# Patient Record
Sex: FEMALE | Race: ASIAN | Employment: OTHER | URBAN - METROPOLITAN AREA
[De-identification: names, ages, dates, MRNs, and addresses within clinical notes are randomized per-mention and may not be internally consistent; named-entity substitution may affect disease eponyms.]

---

## 2017-10-02 ENCOUNTER — HOSPITAL ENCOUNTER (EMERGENCY)
Age: 62
Discharge: HOME | End: 2017-10-02
Attending: EMERGENCY MEDICINE
Payer: COMMERCIAL

## 2017-10-02 VITALS
HEART RATE: 76 BPM | OXYGEN SATURATION: 97 % | SYSTOLIC BLOOD PRESSURE: 161 MMHG | TEMPERATURE: 98 F | HEIGHT: 63 IN | BODY MASS INDEX: 20.91 KG/M2 | WEIGHT: 118 LBS | DIASTOLIC BLOOD PRESSURE: 74 MMHG | RESPIRATION RATE: 16 BRPM

## 2017-10-02 DIAGNOSIS — M54.50 LOW BACK PAIN WITHOUT SCIATICA, UNSPECIFIED BACK PAIN LATERALITY, UNSPECIFIED CHRONICITY: Primary | ICD-10-CM

## 2017-10-02 LAB
ALBUMIN SERPL-MCNC: 4.1 G/DL (ref 3.5–5)
ALBUMIN/GLOB SERPL: 1.2 {RATIO} (ref 1.1–2.2)
ALP SERPL-CCNC: 80 U/L (ref 45–117)
ALT SERPL-CCNC: 24 U/L (ref 12–78)
ANION GAP SERPL CALC-SCNC: 8 MMOL/L (ref 5–15)
APPEARANCE UR: CLEAR
AST SERPL-CCNC: 25 U/L (ref 15–37)
BACTERIA URNS QL MICRO: ABNORMAL /HPF
BASOPHILS # BLD: 0 K/UL (ref 0–0.1)
BASOPHILS NFR BLD: 0 % (ref 0–1)
BILIRUB DIRECT SERPL-MCNC: 0.1 MG/DL (ref 0–0.2)
BILIRUB SERPL-MCNC: 0.6 MG/DL (ref 0.2–1)
BILIRUB UR QL: NEGATIVE
BUN SERPL-MCNC: 10 MG/DL (ref 6–20)
BUN/CREAT SERPL: 13 (ref 12–20)
CALCIUM SERPL-MCNC: 8.7 MG/DL (ref 8.5–10.1)
CHLORIDE SERPL-SCNC: 100 MMOL/L (ref 97–108)
CO2 SERPL-SCNC: 30 MMOL/L (ref 21–32)
COLOR UR: ABNORMAL
CREAT SERPL-MCNC: 0.75 MG/DL (ref 0.55–1.02)
DIFFERENTIAL METHOD BLD: NORMAL
EOSINOPHIL # BLD: 0.1 K/UL (ref 0–0.4)
EOSINOPHIL NFR BLD: 1 % (ref 0–7)
EPITH CASTS URNS QL MICRO: ABNORMAL /LPF
ERYTHROCYTE [DISTWIDTH] IN BLOOD BY AUTOMATED COUNT: 13 % (ref 11.5–14.5)
GLOBULIN SER CALC-MCNC: 3.5 G/DL (ref 2–4)
GLUCOSE SERPL-MCNC: 132 MG/DL (ref 65–100)
GLUCOSE UR STRIP.AUTO-MCNC: NEGATIVE MG/DL
HCT VFR BLD AUTO: 42.9 % (ref 35–47)
HGB BLD-MCNC: 14.1 G/DL (ref 11.5–16)
HGB UR QL STRIP: ABNORMAL
KETONES UR QL STRIP.AUTO: NEGATIVE MG/DL
LEUKOCYTE ESTERASE UR QL STRIP.AUTO: NEGATIVE
LIPASE SERPL-CCNC: 222 U/L (ref 73–393)
LYMPHOCYTES # BLD: 1.6 K/UL (ref 0.8–3.5)
LYMPHOCYTES NFR BLD: 24 % (ref 12–49)
MCH RBC QN AUTO: 29.4 PG (ref 26–34)
MCHC RBC AUTO-ENTMCNC: 32.9 G/DL (ref 30–36.5)
MCV RBC AUTO: 89.6 FL (ref 80–99)
MONOCYTES # BLD: 0.5 K/UL (ref 0–1)
MONOCYTES NFR BLD: 7 % (ref 5–13)
NEUTS SEG # BLD: 4.6 K/UL (ref 1.8–8)
NEUTS SEG NFR BLD: 68 % (ref 32–75)
NITRITE UR QL STRIP.AUTO: NEGATIVE
PH UR STRIP: 6.5 [PH] (ref 5–8)
PLATELET # BLD AUTO: 176 K/UL (ref 150–400)
POTASSIUM SERPL-SCNC: 3.8 MMOL/L (ref 3.5–5.1)
PROT SERPL-MCNC: 7.6 G/DL (ref 6.4–8.2)
PROT UR STRIP-MCNC: NEGATIVE MG/DL
RBC # BLD AUTO: 4.79 M/UL (ref 3.8–5.2)
RBC #/AREA URNS HPF: ABNORMAL /HPF (ref 0–5)
SODIUM SERPL-SCNC: 138 MMOL/L (ref 136–145)
SP GR UR REFRACTOMETRY: <1.005 (ref 1–1.03)
UA: UC IF INDICATED,UAUC: ABNORMAL
UROBILINOGEN UR QL STRIP.AUTO: 0.2 EU/DL (ref 0.2–1)
WBC # BLD AUTO: 6.8 K/UL (ref 3.6–11)
WBC URNS QL MICRO: ABNORMAL /HPF (ref 0–4)

## 2017-10-02 PROCEDURE — 87186 SC STD MICRODIL/AGAR DIL: CPT | Performed by: EMERGENCY MEDICINE

## 2017-10-02 PROCEDURE — 36415 COLL VENOUS BLD VENIPUNCTURE: CPT | Performed by: EMERGENCY MEDICINE

## 2017-10-02 PROCEDURE — 83690 ASSAY OF LIPASE: CPT | Performed by: EMERGENCY MEDICINE

## 2017-10-02 PROCEDURE — 74011250636 HC RX REV CODE- 250/636: Performed by: EMERGENCY MEDICINE

## 2017-10-02 PROCEDURE — 87077 CULTURE AEROBIC IDENTIFY: CPT | Performed by: EMERGENCY MEDICINE

## 2017-10-02 PROCEDURE — 80076 HEPATIC FUNCTION PANEL: CPT | Performed by: EMERGENCY MEDICINE

## 2017-10-02 PROCEDURE — 96374 THER/PROPH/DIAG INJ IV PUSH: CPT

## 2017-10-02 PROCEDURE — 85025 COMPLETE CBC W/AUTO DIFF WBC: CPT | Performed by: EMERGENCY MEDICINE

## 2017-10-02 PROCEDURE — 81001 URINALYSIS AUTO W/SCOPE: CPT | Performed by: EMERGENCY MEDICINE

## 2017-10-02 PROCEDURE — 99284 EMERGENCY DEPT VISIT MOD MDM: CPT

## 2017-10-02 PROCEDURE — 87086 URINE CULTURE/COLONY COUNT: CPT | Performed by: EMERGENCY MEDICINE

## 2017-10-02 PROCEDURE — 80048 BASIC METABOLIC PNL TOTAL CA: CPT | Performed by: EMERGENCY MEDICINE

## 2017-10-02 PROCEDURE — 96361 HYDRATE IV INFUSION ADD-ON: CPT

## 2017-10-02 RX ORDER — KETOROLAC TROMETHAMINE 30 MG/ML
15 INJECTION, SOLUTION INTRAMUSCULAR; INTRAVENOUS
Status: COMPLETED | OUTPATIENT
Start: 2017-10-02 | End: 2017-10-02

## 2017-10-02 RX ORDER — METOPROLOL SUCCINATE 50 MG/1
50 TABLET, EXTENDED RELEASE ORAL
COMMUNITY

## 2017-10-02 RX ORDER — METFORMIN HYDROCHLORIDE 750 MG/1
750 TABLET, EXTENDED RELEASE ORAL
Status: ON HOLD | COMMUNITY
End: 2017-10-23

## 2017-10-02 RX ORDER — TRAMADOL HYDROCHLORIDE 50 MG/1
50 TABLET ORAL
Qty: 20 TAB | Refills: 0 | Status: SHIPPED | OUTPATIENT
Start: 2017-10-02 | End: 2017-10-07

## 2017-10-02 RX ORDER — PRAVASTATIN SODIUM 40 MG/1
40 TABLET ORAL
COMMUNITY
End: 2019-06-18 | Stop reason: ALTCHOICE

## 2017-10-02 RX ORDER — LEVOTHYROXINE SODIUM 75 UG/1
75 TABLET ORAL
COMMUNITY

## 2017-10-02 RX ORDER — IBUPROFEN 600 MG/1
600 TABLET ORAL
Qty: 20 TAB | Refills: 0 | Status: SHIPPED | OUTPATIENT
Start: 2017-10-02 | End: 2017-10-07

## 2017-10-02 RX ADMIN — KETOROLAC TROMETHAMINE 15 MG: 30 INJECTION, SOLUTION INTRAMUSCULAR at 09:50

## 2017-10-02 RX ADMIN — SODIUM CHLORIDE 1000 ML: 900 INJECTION, SOLUTION INTRAVENOUS at 09:50

## 2017-10-02 NOTE — ED NOTES
Pt was discharged and given instructions by Dr Bing Romero . Pt and daughter verbalized good understanding of all discharge instructions,prescriptions and F/U care. All questions answered. Pt in stable condition on discharge. Pt ambulated out of ER with a steady gait.

## 2017-10-02 NOTE — ED TRIAGE NOTES
Pt was wheeled to the treatment area. Pt states \"On Saturday I had to stand outside in the cold for 1 hour. Since then I have had back pain with radiation to my hips and thighs. \" Pt denies fever.  Daughter states \"she complained of abdominal pain to\"

## 2017-10-02 NOTE — DISCHARGE INSTRUCTIONS
Back Pain: Care Instructions  Your Care Instructions    Back pain has many possible causes. It is often related to problems with muscles and ligaments of the back. It may also be related to problems with the nerves, discs, or bones of the back. Moving, lifting, standing, sitting, or sleeping in an awkward way can strain the back. Sometimes you don't notice the injury until later. Arthritis is another common cause of back pain. Although it may hurt a lot, back pain usually improves on its own within several weeks. Most people recover in 12 weeks or less. Using good home treatment and being careful not to stress your back can help you feel better sooner. Follow-up care is a key part of your treatment and safety. Be sure to make and go to all appointments, and call your doctor if you are having problems. Its also a good idea to know your test results and keep a list of the medicines you take. How can you care for yourself at home? · Sit or lie in positions that are most comfortable and reduce your pain. Try one of these positions when you lie down:  ¨ Lie on your back with your knees bent and supported by large pillows. ¨ Lie on the floor with your legs on the seat of a sofa or chair. Garald Shaver on your side with your knees and hips bent and a pillow between your legs. ¨ Lie on your stomach if it does not make pain worse. · Do not sit up in bed, and avoid soft couches and twisted positions. Bed rest can help relieve pain at first, but it delays healing. Avoid bed rest after the first day of back pain. · Change positions every 30 minutes. If you must sit for long periods of time, take breaks from sitting. Get up and walk around, or lie in a comfortable position. · Try using a heating pad on a low or medium setting for 15 to 20 minutes every 2 or 3 hours. Try a warm shower in place of one session with the heating pad. · You can also try an ice pack for 10 to 15 minutes every 2 to 3 hours.  Put a thin cloth between the ice pack and your skin. · Take pain medicines exactly as directed. ¨ If the doctor gave you a prescription medicine for pain, take it as prescribed. ¨ If you are not taking a prescription pain medicine, ask your doctor if you can take an over-the-counter medicine. · Take short walks several times a day. You can start with 5 to 10 minutes, 3 or 4 times a day, and work up to longer walks. Walk on level surfaces and avoid hills and stairs until your back is better. · Return to work and other activities as soon as you can. Continued rest without activity is usually not good for your back. · To prevent future back pain, do exercises to stretch and strengthen your back and stomach. Learn how to use good posture, safe lifting techniques, and proper body mechanics. When should you call for help? Call your doctor now or seek immediate medical care if:  · You have new or worsening numbness in your legs. · You have new or worsening weakness in your legs. (This could make it hard to stand up.)  · You lose control of your bladder or bowels. Watch closely for changes in your health, and be sure to contact your doctor if:  · Your pain gets worse. · You are not getting better after 2 weeks. Where can you learn more? Go to http://elba-lydia.info/. Enter E996 in the search box to learn more about \"Back Pain: Care Instructions. \"  Current as of: March 21, 2017  Content Version: 11.3  © 5143-5743 [employer]. Care instructions adapted under license by [employer] (which disclaims liability or warranty for this information). If you have questions about a medical condition or this instruction, always ask your healthcare professional. [address] any warranty or liability for your use of this information.

## 2017-10-02 NOTE — ED NOTES
Plan of care and all test/meds ordered explained to pt and daughter. Good understanding and agreement with plan was verbalized.

## 2017-10-02 NOTE — ED PROVIDER NOTES
HPI Comments: 22-year-old  female presents to the emergency department with back pain. Patient was at a wedding 2 days ago outside. She says that it was chilly outside. She was shaking during the wedding. She then went home because she started to have back pain. Patient also complains of abdominal pain. The abdominal pain does not hurt to palpate the abdomen. She feels like the back pain radiates to her abdomen. Patient denies weakness or numbness in arms or legs. She denies difficulty with having bowel movements. No dysuria or hematuria. No urinary frequency. Patient denies fevers. Yesterday she was feeling much better than on Saturday. She still was having low back pain. She took one ibuprofen yesterday and a Tylenol with codeine this morning. Patient is here with her daughter. Patient denies tobacco or alcohol use. The history is provided by the patient and a relative. Past Medical History:   Diagnosis Date    Diabetes ([facility] [address]     type 2    Hypertension     Ill-defined condition     cholesterol    Thyroid dysfunction     Hypothyroid       History reviewed. No pertinent surgical history. History reviewed. No pertinent family history. Social History     Social History    Marital status: N/A     Spouse name: N/A    Number of children: N/A    Years of education: N/A     Occupational History    Not on file. Social History Main Topics    Smoking status: Not on file    Smokeless tobacco: Not on file    Alcohol use Not on file    Drug use: Not on file    Sexual activity: Not on file     Other Topics Concern    Not on file     Social History Narrative    No narrative on file         ALLERGIES: Review of patient's allergies indicates no known allergies. Review of Systems   Constitutional: Negative for fever. HENT: Negative for facial swelling and nosebleeds. Eyes: Negative for pain. Respiratory: Negative for cough, chest tightness and shortness of breath. Cardiovascular: Negative for chest pain and leg swelling. Gastrointestinal: Positive for abdominal pain. Negative for diarrhea, nausea and vomiting. Endocrine: Negative for polyuria. Genitourinary: Negative for difficulty urinating and flank pain. Musculoskeletal: Positive for arthralgias and back pain. Skin: Negative for color change. Allergic/Immunologic: Negative for immunocompromised state. Neurological: Negative for dizziness and headaches. Hematological: Does not bruise/bleed easily. Psychiatric/Behavioral: Negative for agitation. All other systems reviewed and are negative. Vitals:    10/02/17 0902   BP: 155/74   Pulse: 76   Resp: 16   Temp: 98 °F (36.7 °C)   SpO2: 100%   Weight: 53.5 kg (118 lb)   Height: 5' 3\" (1.6 m)            Physical Exam   Constitutional: She is oriented to person, place, and time. She appears well-developed and well-nourished. HENT:   Head: Normocephalic and atraumatic. Right Ear: External ear normal.   Left Ear: External ear normal.   Nose: Nose normal.   Mouth/Throat: Oropharynx is clear and moist.   Eyes: EOM are normal. Pupils are equal, round, and reactive to light. No scleral icterus. Neck: Normal range of motion. Neck supple. No JVD present. No tracheal deviation present. No thyromegaly present. Cardiovascular: Normal rate, regular rhythm, normal heart sounds and intact distal pulses. Exam reveals no friction rub. No murmur heard. Pulmonary/Chest: Effort normal and breath sounds normal. No stridor. No respiratory distress. She has no wheezes. She has no rales. She exhibits no tenderness. Abdominal: Soft. Bowel sounds are normal. She exhibits no distension. There is no tenderness. There is no rebound and no guarding. No pain to palpation in all 4 quadrants   Musculoskeletal: Normal range of motion. She exhibits tenderness. She exhibits no edema or deformity.    Mild tenderness in bilateral lower lumbar region, good ROM of hips bilaterally w/o pain, no swelling of legs   Lymphadenopathy:     She has no cervical adenopathy. Neurological: She is alert and oriented to person, place, and time. She has normal reflexes. No cranial nerve deficit. Coordination normal.   Cranial nerves 2-12 are intact. Strength 5/5 and normal in biceps, triceps and hand  bilaterally. Strength 5/5 in plantar and dorsi flexion of feet bilaterally. Normal sensation in arms and legs bilaterally to light touch. Skin: Skin is warm and dry. No rash noted. No erythema. Psychiatric: She has a normal mood and affect. Her behavior is normal. Judgment and thought content normal.   Nursing note and vitals reviewed. MDM  Number of Diagnoses or Management Options  Diagnosis management comments: Patient has mild low back pain. She has no abdominal pain to palpation. We'll give her IV fluids, Toradol. We'll check basic blood work. She has an appointment with her new PCP at 3 PM today. Amount and/or Complexity of Data Reviewed  Clinical lab tests: ordered and reviewed  Tests in the medicine section of CPT®: ordered and reviewed  Decide to obtain previous medical records or to obtain history from someone other than the patient: yes  Review and summarize past medical records: yes  Independent visualization of images, tracings, or specimens: yes    Risk of Complications, Morbidity, and/or Mortality  Presenting problems: moderate  Diagnostic procedures: moderate  Management options: moderate      ED Course       Procedures    Labs are WNL    Gave Motrin and Ultram prn    Pt has f/u w/ PCP at 3 pm today    Pt is improved after IVF and Toradol. Pain seems to be muscular. Good return precautions given to patient. Close follow up with PCP recommended. Patient and/or family voices understanding of this plan. Discharge instructions were explained by me and all concerns were addressed.

## 2017-10-04 LAB
BACTERIA SPEC CULT: ABNORMAL
CC UR VC: ABNORMAL
SERVICE CMNT-IMP: ABNORMAL

## 2017-10-04 RX ORDER — NITROFURANTOIN 25; 75 MG/1; MG/1
100 CAPSULE ORAL 2 TIMES DAILY
Qty: 6 CAP | Refills: 0 | Status: SHIPPED | OUTPATIENT
Start: 2017-10-04 | End: 2017-10-07

## 2017-10-04 NOTE — PROGRESS NOTES
Spoke with patient. She has been experiencing frequency.    [address] for cipro 500 mg bid x 3 d to [facility]

## 2017-10-05 ENCOUNTER — APPOINTMENT (OUTPATIENT)
Dept: CT IMAGING | Age: 62
End: 2017-10-05
Attending: EMERGENCY MEDICINE
Payer: COMMERCIAL

## 2017-10-05 ENCOUNTER — HOSPITAL ENCOUNTER (OUTPATIENT)
Age: 62
Setting detail: OBSERVATION
Discharge: HOME | End: 2017-10-07
Attending: EMERGENCY MEDICINE | Admitting: INTERNAL MEDICINE
Payer: COMMERCIAL

## 2017-10-05 DIAGNOSIS — M48.061 SPINAL STENOSIS OF LUMBAR REGION WITHOUT NEUROGENIC CLAUDICATION: ICD-10-CM

## 2017-10-05 DIAGNOSIS — R11.2 NAUSEA AND VOMITING, INTRACTABILITY OF VOMITING NOT SPECIFIED, UNSPECIFIED VOMITING TYPE: ICD-10-CM

## 2017-10-05 DIAGNOSIS — N30.00 ACUTE CYSTITIS WITHOUT HEMATURIA: Primary | ICD-10-CM

## 2017-10-05 PROBLEM — N39.0 UTI (URINARY TRACT INFECTION): Status: ACTIVE | Noted: 2017-10-05

## 2017-10-05 PROBLEM — R11.10 VOMITING: Status: ACTIVE | Noted: 2017-10-05

## 2017-10-05 PROBLEM — M54.9 BACK PAIN: Status: ACTIVE | Noted: 2017-10-05

## 2017-10-05 LAB
ALBUMIN SERPL-MCNC: 4 G/DL (ref 3.5–5)
ALBUMIN/GLOB SERPL: 1.1 {RATIO} (ref 1.1–2.2)
ALP SERPL-CCNC: 76 U/L (ref 45–117)
ALT SERPL-CCNC: 20 U/L (ref 12–78)
ANION GAP SERPL CALC-SCNC: 6 MMOL/L (ref 5–15)
APPEARANCE UR: CLEAR
APTT PPP: 24.7 SEC (ref 22.1–32.5)
AST SERPL-CCNC: 21 U/L (ref 15–37)
BACTERIA URNS QL MICRO: ABNORMAL /HPF
BASOPHILS # BLD: 0 K/UL (ref 0–0.1)
BASOPHILS NFR BLD: 0 % (ref 0–1)
BILIRUB SERPL-MCNC: 0.7 MG/DL (ref 0.2–1)
BILIRUB UR QL: NEGATIVE
BUN SERPL-MCNC: 9 MG/DL (ref 6–20)
BUN/CREAT SERPL: 14 (ref 12–20)
CALCIUM SERPL-MCNC: 9 MG/DL (ref 8.5–10.1)
CHLORIDE SERPL-SCNC: 96 MMOL/L (ref 97–108)
CO2 SERPL-SCNC: 33 MMOL/L (ref 21–32)
COLOR UR: ABNORMAL
CREAT SERPL-MCNC: 0.66 MG/DL (ref 0.55–1.02)
DIFFERENTIAL METHOD BLD: NORMAL
EOSINOPHIL # BLD: 0 K/UL (ref 0–0.4)
EOSINOPHIL NFR BLD: 0 % (ref 0–7)
EPITH CASTS URNS QL MICRO: ABNORMAL /LPF
ERYTHROCYTE [DISTWIDTH] IN BLOOD BY AUTOMATED COUNT: 12.6 % (ref 11.5–14.5)
GLOBULIN SER CALC-MCNC: 3.8 G/DL (ref 2–4)
GLUCOSE SERPL-MCNC: 142 MG/DL (ref 65–100)
GLUCOSE UR STRIP.AUTO-MCNC: NEGATIVE MG/DL
HCT VFR BLD AUTO: 40.7 % (ref 35–47)
HGB BLD-MCNC: 13.7 G/DL (ref 11.5–16)
HGB UR QL STRIP: ABNORMAL
INR PPP: 1 (ref 0.9–1.1)
KETONES UR QL STRIP.AUTO: NEGATIVE MG/DL
LACTATE SERPL-SCNC: 1.4 MMOL/L (ref 0.4–2)
LEUKOCYTE ESTERASE UR QL STRIP.AUTO: ABNORMAL
LIPASE SERPL-CCNC: 162 U/L (ref 73–393)
LYMPHOCYTES # BLD: 1.4 K/UL (ref 0.8–3.5)
LYMPHOCYTES NFR BLD: 16 % (ref 12–49)
MCH RBC QN AUTO: 29.7 PG (ref 26–34)
MCHC RBC AUTO-ENTMCNC: 33.7 G/DL (ref 30–36.5)
MCV RBC AUTO: 88.1 FL (ref 80–99)
MONOCYTES # BLD: 0.9 K/UL (ref 0–1)
MONOCYTES NFR BLD: 10 % (ref 5–13)
NEUTS SEG # BLD: 6.5 K/UL (ref 1.8–8)
NEUTS SEG NFR BLD: 74 % (ref 32–75)
NITRITE UR QL STRIP.AUTO: POSITIVE
PH UR STRIP: 7 [PH] (ref 5–8)
PLATELET # BLD AUTO: 178 K/UL (ref 150–400)
POTASSIUM SERPL-SCNC: 4 MMOL/L (ref 3.5–5.1)
PROT SERPL-MCNC: 7.8 G/DL (ref 6.4–8.2)
PROT UR STRIP-MCNC: NEGATIVE MG/DL
PROTHROMBIN TIME: 9.3 SEC (ref 9–11.1)
RBC # BLD AUTO: 4.62 M/UL (ref 3.8–5.2)
RBC #/AREA URNS HPF: ABNORMAL /HPF (ref 0–5)
SODIUM SERPL-SCNC: 135 MMOL/L (ref 136–145)
SP GR UR REFRACTOMETRY: 1.01 (ref 1–1.03)
THERAPEUTIC RANGE,PTTT: NORMAL SECS (ref 58–77)
UROBILINOGEN UR QL STRIP.AUTO: 0.2 EU/DL (ref 0.2–1)
WBC # BLD AUTO: 8.8 K/UL (ref 3.6–11)
WBC URNS QL MICRO: ABNORMAL /HPF (ref 0–4)

## 2017-10-05 PROCEDURE — 80053 COMPREHEN METABOLIC PANEL: CPT | Performed by: EMERGENCY MEDICINE

## 2017-10-05 PROCEDURE — 72131 CT LUMBAR SPINE W/O DYE: CPT

## 2017-10-05 PROCEDURE — 74011250636 HC RX REV CODE- 250/636: Performed by: EMERGENCY MEDICINE

## 2017-10-05 PROCEDURE — 74011000258 HC RX REV CODE- 258: Performed by: EMERGENCY MEDICINE

## 2017-10-05 PROCEDURE — 85610 PROTHROMBIN TIME: CPT | Performed by: EMERGENCY MEDICINE

## 2017-10-05 PROCEDURE — 96361 HYDRATE IV INFUSION ADD-ON: CPT

## 2017-10-05 PROCEDURE — 70450 CT HEAD/BRAIN W/O DYE: CPT

## 2017-10-05 PROCEDURE — 96375 TX/PRO/DX INJ NEW DRUG ADDON: CPT

## 2017-10-05 PROCEDURE — 83690 ASSAY OF LIPASE: CPT | Performed by: EMERGENCY MEDICINE

## 2017-10-05 PROCEDURE — 99285 EMERGENCY DEPT VISIT HI MDM: CPT

## 2017-10-05 PROCEDURE — 85730 THROMBOPLASTIN TIME PARTIAL: CPT | Performed by: EMERGENCY MEDICINE

## 2017-10-05 PROCEDURE — 74011250637 HC RX REV CODE- 250/637: Performed by: INTERNAL MEDICINE

## 2017-10-05 PROCEDURE — 74011250636 HC RX REV CODE- 250/636: Performed by: INTERNAL MEDICINE

## 2017-10-05 PROCEDURE — 81001 URINALYSIS AUTO W/SCOPE: CPT | Performed by: EMERGENCY MEDICINE

## 2017-10-05 PROCEDURE — 99218 HC RM OBSERVATION: CPT

## 2017-10-05 PROCEDURE — 36415 COLL VENOUS BLD VENIPUNCTURE: CPT | Performed by: EMERGENCY MEDICINE

## 2017-10-05 PROCEDURE — 83605 ASSAY OF LACTIC ACID: CPT | Performed by: EMERGENCY MEDICINE

## 2017-10-05 PROCEDURE — 85025 COMPLETE CBC W/AUTO DIFF WBC: CPT | Performed by: EMERGENCY MEDICINE

## 2017-10-05 PROCEDURE — 96365 THER/PROPH/DIAG IV INF INIT: CPT

## 2017-10-05 RX ORDER — ACETAMINOPHEN 325 MG/1
650 TABLET ORAL
Status: DISCONTINUED | OUTPATIENT
Start: 2017-10-05 | End: 2017-10-07 | Stop reason: HOSPADM

## 2017-10-05 RX ORDER — DEXTROSE 50 % IN WATER (D50W) INTRAVENOUS SYRINGE
12.5-25 AS NEEDED
Status: DISCONTINUED | OUTPATIENT
Start: 2017-10-05 | End: 2017-10-07 | Stop reason: HOSPADM

## 2017-10-05 RX ORDER — PROCHLORPERAZINE EDISYLATE 5 MG/ML
10 INJECTION INTRAMUSCULAR; INTRAVENOUS
Status: COMPLETED | OUTPATIENT
Start: 2017-10-05 | End: 2017-10-05

## 2017-10-05 RX ORDER — METFORMIN HYDROCHLORIDE 750 MG/1
750 TABLET, EXTENDED RELEASE ORAL DAILY
Status: DISCONTINUED | OUTPATIENT
Start: 2017-10-06 | End: 2017-10-07 | Stop reason: HOSPADM

## 2017-10-05 RX ORDER — DIPHENHYDRAMINE HCL 25 MG
25 CAPSULE ORAL
Status: DISCONTINUED | OUTPATIENT
Start: 2017-10-05 | End: 2017-10-07 | Stop reason: HOSPADM

## 2017-10-05 RX ORDER — ENOXAPARIN SODIUM 100 MG/ML
40 INJECTION SUBCUTANEOUS EVERY 24 HOURS
Status: DISCONTINUED | OUTPATIENT
Start: 2017-10-06 | End: 2017-10-07 | Stop reason: HOSPADM

## 2017-10-05 RX ORDER — INSULIN LISPRO 100 [IU]/ML
INJECTION, SOLUTION INTRAVENOUS; SUBCUTANEOUS
Status: DISCONTINUED | OUTPATIENT
Start: 2017-10-06 | End: 2017-10-07 | Stop reason: HOSPADM

## 2017-10-05 RX ORDER — MAGNESIUM SULFATE 100 %
4 CRYSTALS MISCELLANEOUS AS NEEDED
Status: DISCONTINUED | OUTPATIENT
Start: 2017-10-05 | End: 2017-10-07 | Stop reason: HOSPADM

## 2017-10-05 RX ORDER — METOPROLOL SUCCINATE 50 MG/1
50 TABLET, EXTENDED RELEASE ORAL DAILY
Status: DISCONTINUED | OUTPATIENT
Start: 2017-10-06 | End: 2017-10-07 | Stop reason: HOSPADM

## 2017-10-05 RX ORDER — PROCHLORPERAZINE EDISYLATE 5 MG/ML
5 INJECTION INTRAMUSCULAR; INTRAVENOUS
Status: DISCONTINUED | OUTPATIENT
Start: 2017-10-05 | End: 2017-10-07 | Stop reason: HOSPADM

## 2017-10-05 RX ORDER — SODIUM CHLORIDE 9 MG/ML
100 INJECTION, SOLUTION INTRAVENOUS CONTINUOUS
Status: DISCONTINUED | OUTPATIENT
Start: 2017-10-05 | End: 2017-10-07 | Stop reason: HOSPADM

## 2017-10-05 RX ORDER — LEVOTHYROXINE SODIUM 75 UG/1
75 TABLET ORAL
Status: DISCONTINUED | OUTPATIENT
Start: 2017-10-06 | End: 2017-10-07 | Stop reason: HOSPADM

## 2017-10-05 RX ORDER — KETOROLAC TROMETHAMINE 30 MG/ML
30 INJECTION, SOLUTION INTRAMUSCULAR; INTRAVENOUS
Status: COMPLETED | OUTPATIENT
Start: 2017-10-05 | End: 2017-10-05

## 2017-10-05 RX ORDER — ONDANSETRON 2 MG/ML
4 INJECTION INTRAMUSCULAR; INTRAVENOUS
Status: DISCONTINUED | OUTPATIENT
Start: 2017-10-05 | End: 2017-10-07

## 2017-10-05 RX ORDER — HYDROCODONE BITARTRATE AND ACETAMINOPHEN 5; 325 MG/1; MG/1
1 TABLET ORAL
Status: DISCONTINUED | OUTPATIENT
Start: 2017-10-05 | End: 2017-10-07 | Stop reason: HOSPADM

## 2017-10-05 RX ORDER — NALOXONE HYDROCHLORIDE 0.4 MG/ML
0.4 INJECTION, SOLUTION INTRAMUSCULAR; INTRAVENOUS; SUBCUTANEOUS AS NEEDED
Status: DISCONTINUED | OUTPATIENT
Start: 2017-10-05 | End: 2017-10-07 | Stop reason: HOSPADM

## 2017-10-05 RX ORDER — DEXAMETHASONE SODIUM PHOSPHATE 4 MG/ML
4 INJECTION, SOLUTION INTRA-ARTICULAR; INTRALESIONAL; INTRAMUSCULAR; INTRAVENOUS; SOFT TISSUE EVERY 6 HOURS
Status: DISCONTINUED | OUTPATIENT
Start: 2017-10-05 | End: 2017-10-06

## 2017-10-05 RX ORDER — TRAMADOL HYDROCHLORIDE 50 MG/1
50 TABLET ORAL
Status: DISCONTINUED | OUTPATIENT
Start: 2017-10-05 | End: 2017-10-07 | Stop reason: HOSPADM

## 2017-10-05 RX ORDER — DIPHENHYDRAMINE HYDROCHLORIDE 50 MG/ML
12.5 INJECTION, SOLUTION INTRAMUSCULAR; INTRAVENOUS
Status: COMPLETED | OUTPATIENT
Start: 2017-10-05 | End: 2017-10-05

## 2017-10-05 RX ORDER — PRAVASTATIN SODIUM 20 MG/1
40 TABLET ORAL
Status: DISCONTINUED | OUTPATIENT
Start: 2017-10-05 | End: 2017-10-07 | Stop reason: HOSPADM

## 2017-10-05 RX ADMIN — ACETAMINOPHEN 650 MG: 325 TABLET ORAL at 23:03

## 2017-10-05 RX ADMIN — ONDANSETRON 4 MG: 2 INJECTION INTRAMUSCULAR; INTRAVENOUS at 22:48

## 2017-10-05 RX ADMIN — SODIUM CHLORIDE 1000 ML: 900 INJECTION, SOLUTION INTRAVENOUS at 16:33

## 2017-10-05 RX ADMIN — DEXAMETHASONE SODIUM PHOSPHATE 4 MG: 4 INJECTION, SOLUTION INTRAMUSCULAR; INTRAVENOUS at 22:47

## 2017-10-05 RX ADMIN — CEFTRIAXONE SODIUM 1 G: 1 INJECTION, POWDER, FOR SOLUTION INTRAMUSCULAR; INTRAVENOUS at 18:45

## 2017-10-05 RX ADMIN — DIPHENHYDRAMINE HYDROCHLORIDE 12.5 MG: 50 INJECTION, SOLUTION INTRAMUSCULAR; INTRAVENOUS at 16:34

## 2017-10-05 RX ADMIN — KETOROLAC TROMETHAMINE 30 MG: 30 INJECTION, SOLUTION INTRAMUSCULAR at 19:20

## 2017-10-05 RX ADMIN — SODIUM CHLORIDE 125 ML/HR: 900 INJECTION, SOLUTION INTRAVENOUS at 22:31

## 2017-10-05 RX ADMIN — PROCHLORPERAZINE EDISYLATE 10 MG: 5 INJECTION INTRAMUSCULAR; INTRAVENOUS at 16:33

## 2017-10-05 NOTE — ED NOTES
Bedside and Verbal shift change report given to Jaye Bañuelos RN (oncoming nurse) by Neymar Mackenzie RN (offgoing nurse). Report included the following information SBAR, Kardex, ED Summary and MAR.

## 2017-10-05 NOTE — IP AVS SNAPSHOT
Abner Rivera 
 
 
 [address] [place] [address] 
[phone] Patient: Erlinda aHrry MRN: [id number] TUS:7[birth year]81/[birth year] Current Discharge Medication List  
  
START taking these medications Dose & Instructions Dispensing Information Comments Morning Noon Evening Bedtime  
 ciprofloxacin HCl 500 mg tablet Commonly known as:  CIPRO Your last dose was: Your next dose is:    
   
   
 Dose:  500 mg Take 1 Tab by mouth every twelve (12) hours. Quantity:  14 Tab Refills:  0 HYDROcodone-acetaminophen 5-325 mg per tablet Commonly known as:  Veronica Elise Your last dose was: Your next dose is:    
   
   
 Dose:  1 Tab Take 1 Tab by mouth every four (4) hours as needed. Max Daily Amount: 6 Tabs. PRN severe pain Quantity:  10 Tab Refills:  0  
     
   
   
   
  
 methylPREDNISolone 4 mg tablet Commonly known as:  MEDROL (BREANNE) Your last dose was: Your next dose is:    
   
   
 Dose:  4 mg Take 1 Tab by mouth Specific Days and Specific Times. Quantity:  1 Dose Pack Refills:  0  
     
   
   
   
  
 ondansetron 4 mg disintegrating tablet Commonly known as:  ZOFRAN ODT Your last dose was: Your next dose is:    
   
   
 Dose:  4 mg Take 1 Tab by mouth every six (6) hours as needed. Quantity:  12 Tab Refills:  0 CONTINUE these medications which have NOT CHANGED Dose & Instructions Dispensing Information Comments Morning Noon Evening Bedtime  
 levothyroxine 75 mcg tablet Commonly known as:  SYNTHROID Your last dose was: Your next dose is:    
   
   
 Dose:  75 mcg Take 75 mcg by mouth Daily (before breakfast). Refills:  0  
     
   
   
   
  
 metFORMIN  mg tablet Commonly known as:  GLUCOPHAGE XR Your last dose was:     
   
Your next dose is:    
   
   
 Dose:  750 mg  
 Take 750 mg by mouth daily. Refills:  0  
     
   
   
   
  
 metoprolol succinate 50 mg XL tablet Commonly known as:  TOPROL-XL Your last dose was: Your next dose is:    
   
   
 Dose:  50 mg Take 50 mg by mouth daily. Refills:  0  
     
   
   
   
  
 pravastatin 40 mg tablet Commonly known as:  PRAVACHOL Your last dose was: Your next dose is:    
   
   
 Dose:  40 mg Take 40 mg by mouth nightly. Refills:  0 STOP taking these medications   
 ibuprofen 600 mg tablet Commonly known as:  MOTRIN  
   
  
 nitrofurantoin (macrocrystal-monohydrate) 100 mg capsule Commonly known as:  MACROBID  
   
  
 traMADol 50 mg tablet Commonly known as:  ULTRAM  
   
  
  
  
Where to Get Your Medications These medications were sent to [facility] # [id number], [address]  [address] [place] [address] Phone:  [phone]  
  ciprofloxacin HCl 500 mg tablet  
 methylPREDNISolone 4 mg tablet  
 ondansetron 4 mg disintegrating tablet Information on where to get these meds will be given to you by the nurse or doctor. ! Ask your nurse or doctor about these medications HYDROcodone-acetaminophen 5-325 mg per tablet

## 2017-10-05 NOTE — ED NOTES
TRANSFER - OUT REPORT:    Verbal report given to Keith Gore RN(name) on Mehreen Cap  being transferred to [facility](unit) for routine progression of care       Report consisted of patients Situation, Background, Assessment and   Recommendations(SBAR). Information from the following report(s) SBAR, ED Summary, Intake/Output, MAR and Recent Results was reviewed with the receiving nurse. Lines:   Peripheral IV 10/05/17 Left Antecubital (Active)   Site Assessment Clean, dry, & intact 10/5/2017  3:42 PM   Phlebitis Assessment 0 10/5/2017  3:42 PM   Infiltration Assessment 0 10/5/2017  3:42 PM   Dressing Status Clean, dry, & intact 10/5/2017  3:42 PM   Dressing Type Transparent 10/5/2017  3:42 PM   Hub Color/Line Status Pink 10/5/2017  3:42 PM        Opportunity for questions and clarification was provided.       Patient transported with:   EMTALA  20 gauge LAC

## 2017-10-05 NOTE — IP AVS SNAPSHOT
[address] 
[phone] Patient: Raul Cummins MRN: [id number] LXA:[birth year] You are allergic to the following No active allergies Recent Documentation Height Weight BMI OB Status Smoking Status 1.6 m 53.5 kg 20.9 kg/m2 Postmenopausal Never Smoker Emergency Contacts Name Discharge Info Relation Home Work Mobile Kil,Mini DISCHARGE CAREGIVER [3] Daughter [21]   [phone] About your hospitalization You were admitted on:  October 5, 2017 You last received care in the:  [facility] 5M1 MED SURG 1 You were discharged on:  October 7, 2017 Unit phone number:  [phone] Why you were hospitalized Your primary diagnosis was:  Uti (Urinary Tract Infection) Your diagnoses also included:  Back Pain, Vomiting, Diabetes (Hcc), Hypercholesteremia, Hypertension, Hypothyroidism Providers Seen During Your Hospitalizations Provider Role Specialty Primary office phone Oneida Darnell MD Attending Provider Emergency Medicine [phone] Dora Pringle MD Attending Provider Internal Medicine [phone] Didier Fried MD Attending Provider [facility] [phone] Your Primary Care Physician (PCP) Primary Care Physician Office Phone Office Fax Gertie Mcburney [phone] Follow-up Information Follow up With Details Comments Contact Info Prince Marcus MD   [facility] [address] [address] 
[phone] Current Discharge Medication List  
  
START taking these medications Dose & Instructions Dispensing Information Comments Morning Noon Evening Bedtime  
 ciprofloxacin HCl 500 mg tablet Commonly known as:  CIPRO Your last dose was: Your next dose is:    
   
   
 Dose:  500 mg Take 1 Tab by mouth every twelve (12) hours. Quantity:  14 Tab Refills:  0 HYDROcodone-acetaminophen 5-325 mg per tablet Commonly known as:  Leisa Mura Your last dose was: Your next dose is:    
   
   
 Dose:  1 Tab Take 1 Tab by mouth every four (4) hours as needed. Max Daily Amount: 6 Tabs. PRN severe pain Quantity:  10 Tab Refills:  0  
     
   
   
   
  
 methylPREDNISolone 4 mg tablet Commonly known as:  MEDROL (BREANNE) Your last dose was: Your next dose is:    
   
   
 Dose:  4 mg Take 1 Tab by mouth Specific Days and Specific Times. Quantity:  1 Dose Pack Refills:  0  
     
   
   
   
  
 ondansetron 4 mg disintegrating tablet Commonly known as:  ZOFRAN ODT Your last dose was: Your next dose is:    
   
   
 Dose:  4 mg Take 1 Tab by mouth every six (6) hours as needed. Quantity:  12 Tab Refills:  0 CONTINUE these medications which have NOT CHANGED Dose & Instructions Dispensing Information Comments Morning Noon Evening Bedtime  
 levothyroxine 75 mcg tablet Commonly known as:  SYNTHROID Your last dose was: Your next dose is:    
   
   
 Dose:  75 mcg Take 75 mcg by mouth Daily (before breakfast). Refills:  0  
     
   
   
   
  
 metFORMIN  mg tablet Commonly known as:  GLUCOPHAGE XR Your last dose was: Your next dose is:    
   
   
 Dose:  750 mg Take 750 mg by mouth daily. Refills:  0  
     
   
   
   
  
 metoprolol succinate 50 mg XL tablet Commonly known as:  TOPROL-XL Your last dose was: Your next dose is:    
   
   
 Dose:  50 mg Take 50 mg by mouth daily. Refills:  0  
     
   
   
   
  
 pravastatin 40 mg tablet Commonly known as:  PRAVACHOL Your last dose was: Your next dose is:    
   
   
 Dose:  40 mg Take 40 mg by mouth nightly. Refills:  0 STOP taking these medications ibuprofen 600 mg tablet Commonly known as:  MOTRIN  
   
  
 nitrofurantoin (macrocrystal-monohydrate) 100 mg capsule Commonly known as:  MACROBID  
   
  
 traMADol 50 mg tablet Commonly known as:  ULTRAM  
   
  
  
  
Where to Get Your Medications These medications were sent to [facility] # [address] [place] [address], [address]  [address] [place] [address] Phone:  [phone]  
  ciprofloxacin HCl 500 mg tablet  
 methylPREDNISolone 4 mg tablet  
 ondansetron 4 mg disintegrating tablet Information on where to get these meds will be given to you by the nurse or doctor. ! Ask your nurse or doctor about these medications HYDROcodone-acetaminophen 5-325 mg per tablet Discharge Instructions Patient Discharge Instructions Anaid Hernandez / [id number] : 1955 Admitted 10/5/2017 Discharged: 10/7/2017 8:57 AM  
 
ACUTE DIAGNOSES: 
UTI (urinary tract infection) CHRONIC MEDICAL DIAGNOSES: 
Problem List as of 10/7/2017  Date Reviewed: 10/5/2017 Codes Class Noted - Resolved * (Principal)UTI (urinary tract infection) ICD-10-CM: N39.0 ICD-9-CM: 599.0  10/5/2017 - Present Hypothyroidism ICD-10-CM: E03.9 ICD-9-CM: 244.9  Unknown - Present Hypertension ICD-10-CM: I10 
ICD-9-CM: 401.9  Unknown - Present Hypercholesteremia ICD-10-CM: E78.00 ICD-9-CM: 272.0  Unknown - Present Diabetes ([address] ICD-10-CM: E11.9 ICD-9-CM: 250.00  Unknown - Present Overview Signed 10/5/2017  9:40 PM by Ambreen Waller MD  
  type 2 Back pain ICD-10-CM: M54.9 ICD-9-CM: 724.5  10/5/2017 - Present Vomiting ICD-10-CM: R11.10 ICD-9-CM: 787.03  10/5/2017 - Present DISCHARGE MEDICATIONS:  
· It is important that you take the medication exactly as they are prescribed.   
· Keep your medication in the bottles provided by the pharmacist and keep a list of the medication names, dosages, and times to be taken in your wallet. · Do not take other medications without consulting your doctor. DIET:  Diabetic Diet ACTIVITY: Activity as tolerated/ambulate around the house/no driving ADDITIONAL INFORMATION: If you experience any of the following symptoms then please call your primary care physician or return to the emergency room if you cannot get hold of your doctor: Fever, chills, nausea, vomiting, diarrhea, change in mentation, falling, bleeding, shortness of breath. FOLLOW UP CARE: 
Dr. Romeo Domingo MD  you are to call and set up an appointment to see them in 2-3 days. Ortho: Follow-up in 7-10 days with Dr. Cristina Najera. Call to make appt. [phone] Information obtained by : 
I understand that if any problems occur once I am at home I am to contact my physician. I understand and acknowledge receipt of the instructions indicated above. Physician's or R.N.'s Signature                                                                  Date/Time Patient or Representative Signature                                                          Date/Time Discharge Orders None [facility] Announcement We are excited to announce that we are making your provider's discharge notes available to you in [employer]. You will see these notes when they are completed and signed by the physician that discharged you from your recent hospital stay. If you have any questions or concerns about any information you see in [employer], please call the Health Information Department where you were seen or reach out to your Primary Care Provider for more information about your plan of care. Introducing [facility] HE[facility]! Evelin Taylor introduces [employer] patient portal. Now you can access parts of your medical record, email your doctor's office, and request medication refills online. 1. In your internet browser, go to [email or web address] 2. Click on the First Time User? Click Here link in the Sign In box. You will see the New Member Sign Up page. 3. Enter your [employer] Access Code exactly as it appears below. You will not need to use this code after youve completed the sign-up process. If you do not sign up before the expiration date, you must request a new code. · [employer] Access Code: [email or web address] Expires: 12/31/2017 11:38 AM 
 
4. Enter the last four digits of your Social Security Number (xxxx) and Date of Birth (mm/dd/yyyy) as indicated and click Submit. You will be taken to the next sign-up page. 5. Create a [employer] ID. This will be your [employer] login ID and cannot be changed, so think of one that is secure and easy to remember. 6. Create a [employer] password. You can change your password at any time. 7. Enter your Password Reset Question and Answer. This can be used at a later time if you forget your password. 8. Enter your e-mail address. You will receive e-mail notification when new information is available in [address] 9. Click Sign Up. You can now view and download portions of your medical record. 10. Click the Download Summary menu link to download a portable copy of your medical information. If you have questions, please visit the Frequently Asked Questions section of the [employer] website. Remember, [employer] is NOT to be used for urgent needs. For medical emergencies, dial 911. Now available from your iPhone and Android! General Information Please provide this summary of care documentation to your next provider. Patient Signature:  ____________________________________________________________ Date:  ____________________________________________________________  
  
[employer] Provider Signature:  ____________________________________________________________ Date:  ____________________________________________________________

## 2017-10-05 NOTE — ED TRIAGE NOTES
Daughter reports that the pt was here Monday am for back pain. Pt was diagnosed with back pain after blood work was negative. The pt went to PT yesterday and last night the pt began vomiting and now has low grade fever.

## 2017-10-05 NOTE — ED PROVIDER NOTES
Patient is a 57 y.o. female presenting with vomiting and back pain. Vomiting    Associated symptoms include chills, a fever, headaches and headaches. Pertinent negatives include no abdominal pain, no diarrhea and no cough. Back Pain    Associated symptoms include a fever and headaches. Pertinent negatives include no abdominal pain and no dysuria. 56 yo AF presents with vomiting. Per daughter, pt was not feeling well Saturday. Began with chills, then vomited, nonbilious, nonbloody. C/o lower abdominal pain. Was seen in ED Monday morning. Blood work WNL and pt was feeling better. Was seen by PT yesterday for back pain. Today began with low grade fever. Decreased appetite today. Vomited x 1 last night after taking meloxicam and muscle relaxer. Has continued to vomit today. C/o mild headache. Denies diarrhea, last BM yesterday, normal, no bloody or black stool. Normal urine output, no dysuria or hematuria. Denies abdominal pain. Past Medical History:   Diagnosis Date    Diabetes ([facility] [address]     type 2    Hypertension     Ill-defined condition     cholesterol    Thyroid dysfunction     Hypothyroid       History reviewed. No pertinent surgical history. History reviewed. No pertinent family history. Social History     Social History    Marital status:      Spouse name: N/A    Number of children: N/A    Years of education: N/A     Occupational History    Not on file. Social History Main Topics    Smoking status: Never Smoker    Smokeless tobacco: Never Used    Alcohol use No    Drug use: Not on file    Sexual activity: Not on file     Other Topics Concern    Not on file     Social History Narrative         ALLERGIES: Review of patient's allergies indicates no known allergies. Review of Systems   Constitutional: Positive for appetite change, chills and fever. Respiratory: Negative for cough and shortness of breath. Gastrointestinal: Positive for nausea and vomiting.  Negative for abdominal pain, constipation and diarrhea. Genitourinary: Negative for dysuria and hematuria. Musculoskeletal: Positive for back pain. Negative for neck pain and neck stiffness. Skin: Negative for wound. Neurological: Positive for headaches. All other systems reviewed and are negative.       Vitals:    10/05/17 1530 10/05/17 1540 10/05/17 1543 10/05/17 1600   BP: 157/76 162/69  146/63   Pulse:  69 69 64   Resp:  15 10 11   Temp:  98.5 °F (36.9 °C)     SpO2:  98% 98% 96%   Weight:  53.5 kg (118 lb)     Height:  5' 3\" (1.6 m)              Physical Exam   Physical Examination: General appearance - alert, mild distress, oriented to person, place, and time and normal appearing weight  Eyes - pupils equal and reactive, extraocular eye movements intact  Neck - supple, no significant adenopathy  Chest - clear to auscultation, no wheezes, rales or rhonchi, symmetric air entry  Heart - normal rate, regular rhythm, normal S1, S2, no murmurs, rubs, clicks or gallops  Abdomen - soft, nontender, nondistended, no masses or organomegaly  Back exam - limited rom due to pain, mild b/l paraspinal muscle tenderness  Neurological - alert, oriented, normal speech, no focal findings or movement disorder noted  Musculoskeletal - no joint tenderness, deformity or swelling  Extremities - peripheral pulses normal, no pedal edema, no clubbing or cyanosis  Skin - normal coloration and turgor, no rashes, no suspicious skin lesions noted  MDM  Number of Diagnoses or Management Options  Acute cystitis without hematuria:   Nausea and vomiting, intractability of vomiting not specified, unspecified vomiting type:   Spinal stenosis of lumbar region without neurogenic claudication:      Amount and/or Complexity of Data Reviewed  Clinical lab tests: ordered and reviewed  Tests in the radiology section of CPT®: ordered and reviewed  Decide to obtain previous medical records or to obtain history from someone other than the patient: yes  Obtain history from someone other than the patient: yes (daughter)  Review and summarize past medical records: yes  Discuss the patient with other providers: yes (hospitalist)  Independent visualization of images, tracings, or specimens: yes    Patient Progress  Patient progress: stable    ED Course       Procedures    Pt with UTI, low grade fever (99.8 here), vomiting all day today. Will admit for IV abx and supportive care. Pt and daughter agree with plan for admission. Pt with UTI earlier this week, culture with klebsiella. Has not started the abx that were called in for her.    7:00 PM  Headache resolved after meds in ED. Still c/o low back pain. Will try toradol. 7:00 PM  Discussed with Dr. Judith Hood, hospitalist. Will admit.

## 2017-10-06 LAB
ANION GAP SERPL CALC-SCNC: 5 MMOL/L (ref 5–15)
BUN SERPL-MCNC: 10 MG/DL (ref 6–20)
BUN/CREAT SERPL: 16 (ref 12–20)
CALCIUM SERPL-MCNC: 8.4 MG/DL (ref 8.5–10.1)
CHLORIDE SERPL-SCNC: 105 MMOL/L (ref 97–108)
CO2 SERPL-SCNC: 29 MMOL/L (ref 21–32)
CREAT SERPL-MCNC: 0.62 MG/DL (ref 0.55–1.02)
ERYTHROCYTE [DISTWIDTH] IN BLOOD BY AUTOMATED COUNT: 12.8 % (ref 11.5–14.5)
EST. AVERAGE GLUCOSE BLD GHB EST-MCNC: 140 MG/DL
GLUCOSE BLD STRIP.AUTO-MCNC: 119 MG/DL (ref 65–100)
GLUCOSE BLD STRIP.AUTO-MCNC: 124 MG/DL (ref 65–100)
GLUCOSE BLD STRIP.AUTO-MCNC: 157 MG/DL (ref 65–100)
GLUCOSE BLD STRIP.AUTO-MCNC: 168 MG/DL (ref 65–100)
GLUCOSE SERPL-MCNC: 167 MG/DL (ref 65–100)
HBA1C MFR BLD: 6.5 % (ref 4.2–6.3)
HCT VFR BLD AUTO: 36.3 % (ref 35–47)
HGB BLD-MCNC: 11.9 G/DL (ref 11.5–16)
MAGNESIUM SERPL-MCNC: 2.1 MG/DL (ref 1.6–2.4)
MCH RBC QN AUTO: 28.6 PG (ref 26–34)
MCHC RBC AUTO-ENTMCNC: 32.8 G/DL (ref 30–36.5)
MCV RBC AUTO: 87.3 FL (ref 80–99)
PLATELET # BLD AUTO: 172 K/UL (ref 150–400)
POTASSIUM SERPL-SCNC: 3.9 MMOL/L (ref 3.5–5.1)
RBC # BLD AUTO: 4.16 M/UL (ref 3.8–5.2)
SERVICE CMNT-IMP: ABNORMAL
SODIUM SERPL-SCNC: 139 MMOL/L (ref 136–145)
WBC # BLD AUTO: 5.7 K/UL (ref 3.6–11)

## 2017-10-06 PROCEDURE — 74011250636 HC RX REV CODE- 250/636: Performed by: INTERNAL MEDICINE

## 2017-10-06 PROCEDURE — 96366 THER/PROPH/DIAG IV INF ADDON: CPT

## 2017-10-06 PROCEDURE — 36415 COLL VENOUS BLD VENIPUNCTURE: CPT | Performed by: INTERNAL MEDICINE

## 2017-10-06 PROCEDURE — 96376 TX/PRO/DX INJ SAME DRUG ADON: CPT

## 2017-10-06 PROCEDURE — 74011000258 HC RX REV CODE- 258: Performed by: INTERNAL MEDICINE

## 2017-10-06 PROCEDURE — 74011636637 HC RX REV CODE- 636/637: Performed by: INTERNAL MEDICINE

## 2017-10-06 PROCEDURE — 99218 HC RM OBSERVATION: CPT

## 2017-10-06 PROCEDURE — 83735 ASSAY OF MAGNESIUM: CPT | Performed by: INTERNAL MEDICINE

## 2017-10-06 PROCEDURE — 74011250636 HC RX REV CODE- 250/636: Performed by: FAMILY MEDICINE

## 2017-10-06 PROCEDURE — 80048 BASIC METABOLIC PNL TOTAL CA: CPT | Performed by: INTERNAL MEDICINE

## 2017-10-06 PROCEDURE — 82962 GLUCOSE BLOOD TEST: CPT

## 2017-10-06 PROCEDURE — 83036 HEMOGLOBIN GLYCOSYLATED A1C: CPT | Performed by: INTERNAL MEDICINE

## 2017-10-06 PROCEDURE — 74011250637 HC RX REV CODE- 250/637: Performed by: FAMILY MEDICINE

## 2017-10-06 PROCEDURE — 96361 HYDRATE IV INFUSION ADD-ON: CPT

## 2017-10-06 PROCEDURE — 96372 THER/PROPH/DIAG INJ SC/IM: CPT

## 2017-10-06 PROCEDURE — 85027 COMPLETE CBC AUTOMATED: CPT | Performed by: INTERNAL MEDICINE

## 2017-10-06 PROCEDURE — 74011250637 HC RX REV CODE- 250/637: Performed by: INTERNAL MEDICINE

## 2017-10-06 RX ORDER — ONDANSETRON 4 MG/1
4 TABLET, ORALLY DISINTEGRATING ORAL EVERY 8 HOURS
Status: COMPLETED | OUTPATIENT
Start: 2017-10-06 | End: 2017-10-06

## 2017-10-06 RX ORDER — DEXAMETHASONE SODIUM PHOSPHATE 4 MG/ML
2 INJECTION, SOLUTION INTRA-ARTICULAR; INTRALESIONAL; INTRAMUSCULAR; INTRAVENOUS; SOFT TISSUE EVERY 6 HOURS
Status: DISCONTINUED | OUTPATIENT
Start: 2017-10-06 | End: 2017-10-07

## 2017-10-06 RX ADMIN — ONDANSETRON 4 MG: 4 TABLET, ORALLY DISINTEGRATING ORAL at 13:40

## 2017-10-06 RX ADMIN — DEXAMETHASONE SODIUM PHOSPHATE 2 MG: 4 INJECTION, SOLUTION INTRAMUSCULAR; INTRAVENOUS at 17:29

## 2017-10-06 RX ADMIN — SODIUM CHLORIDE 125 ML/HR: 900 INJECTION, SOLUTION INTRAVENOUS at 06:33

## 2017-10-06 RX ADMIN — CEFTRIAXONE SODIUM 1 G: 1 INJECTION, POWDER, FOR SOLUTION INTRAMUSCULAR; INTRAVENOUS at 21:55

## 2017-10-06 RX ADMIN — DEXAMETHASONE SODIUM PHOSPHATE 2 MG: 4 INJECTION, SOLUTION INTRAMUSCULAR; INTRAVENOUS at 11:20

## 2017-10-06 RX ADMIN — LEVOTHYROXINE SODIUM 75 MCG: 75 TABLET ORAL at 07:49

## 2017-10-06 RX ADMIN — ONDANSETRON 4 MG: 2 INJECTION INTRAMUSCULAR; INTRAVENOUS at 06:58

## 2017-10-06 RX ADMIN — DEXAMETHASONE SODIUM PHOSPHATE 2 MG: 4 INJECTION, SOLUTION INTRAMUSCULAR; INTRAVENOUS at 23:10

## 2017-10-06 RX ADMIN — ONDANSETRON 4 MG: 2 INJECTION INTRAMUSCULAR; INTRAVENOUS at 17:45

## 2017-10-06 RX ADMIN — ENOXAPARIN SODIUM 40 MG: 40 INJECTION SUBCUTANEOUS at 08:01

## 2017-10-06 RX ADMIN — INSULIN LISPRO 2 UNITS: 100 INJECTION, SOLUTION INTRAVENOUS; SUBCUTANEOUS at 08:01

## 2017-10-06 RX ADMIN — DEXAMETHASONE SODIUM PHOSPHATE 4 MG: 4 INJECTION, SOLUTION INTRAMUSCULAR; INTRAVENOUS at 04:41

## 2017-10-06 RX ADMIN — PRAVASTATIN SODIUM 40 MG: 20 TABLET ORAL at 21:54

## 2017-10-06 RX ADMIN — ONDANSETRON 4 MG: 4 TABLET, ORALLY DISINTEGRATING ORAL at 21:54

## 2017-10-06 RX ADMIN — ONDANSETRON 4 MG: 4 TABLET, ORALLY DISINTEGRATING ORAL at 08:01

## 2017-10-06 RX ADMIN — TRAMADOL HYDROCHLORIDE 50 MG: 50 TABLET, FILM COATED ORAL at 13:47

## 2017-10-06 RX ADMIN — ACETAMINOPHEN 650 MG: 325 TABLET ORAL at 06:59

## 2017-10-06 RX ADMIN — TRAMADOL HYDROCHLORIDE 50 MG: 50 TABLET, FILM COATED ORAL at 07:10

## 2017-10-06 RX ADMIN — DIPHENHYDRAMINE HYDROCHLORIDE 25 MG: 25 CAPSULE ORAL at 01:00

## 2017-10-06 RX ADMIN — HYDROCODONE BITARTRATE AND ACETAMINOPHEN 1 TABLET: 5; 325 TABLET ORAL at 14:41

## 2017-10-06 RX ADMIN — METFORMIN HYDROCHLORIDE 750 MG: 750 TABLET, EXTENDED RELEASE ORAL at 08:01

## 2017-10-06 RX ADMIN — METOPROLOL SUCCINATE 50 MG: 50 TABLET, FILM COATED, EXTENDED RELEASE ORAL at 08:01

## 2017-10-06 NOTE — PROGRESS NOTES
Bedside shift change report given to Nilda Pablo RN (oncoming nurse) by Dc Hickman RN (offgoing nurse). Report included the following information SBAR, Kardex and MAR.

## 2017-10-06 NOTE — PROGRESS NOTES
Primary Nurse Grant Rodriguez RN and Isha Wu RN performed a dual skin assessment on this patient No impairment noted  Rizwan score is 21

## 2017-10-06 NOTE — ED NOTES
TRANSFER - OUT REPORT:    Verbal report given to Ad Paniagua with [facility](name) on Amber Montoya  being transferred to [address] [place] [address]([facility]) for routine progression of care       Report consisted of patients Situation, Background, Assessment and   Recommendations(SBAR). Information from the following report(s) ED Summary was reviewed with the receiving nurse. Lines:   Peripheral IV 10/05/17 Left Antecubital (Active)   Site Assessment Clean, dry, & intact 10/5/2017  3:42 PM   Phlebitis Assessment 0 10/5/2017  3:42 PM   Infiltration Assessment 0 10/5/2017  3:42 PM   Dressing Status Clean, dry, & intact 10/5/2017  3:42 PM   Dressing Type Transparent 10/5/2017  3:42 PM   Hub Color/Line Status Pink 10/5/2017  3:42 PM        Opportunity for questions and clarification was provided.       Patient transported with:   EMTALA  20 gauge saline lock LAC

## 2017-10-06 NOTE — DIABETES MGMT
DTC Consult Note         Recommendations/ Comments: Pt was not nodding or answering questions, did not participate in interview. Left materials at bedside. Estimated Creatinine Clearance: 75.8 mL/min (based on Cr of 0.62). POC Glucose last 24hrs:   Lab Results   Component Value Date/Time     (H) 10/06/2017 03:48 AM     (H) 10/05/2017 03:44 PM    GLUCPOC 119 (H) 10/06/2017 11:10 AM    GLUCPOC 168 (H) 10/06/2017 06:25 AM        Inpatient medications:  1. Correction Scale: Lispro (Humalog) Normal Sensitivity scale to cover for glucose > 139 mg/dL before meals and for glucose >199 at bedtime      2. Metformin ER 750mg daily      Consult received from Evangelista Trivedi MD for  [x]    Assessment of home management  []    Meal planning  []    Meter / Monitoring  []    New Diagnosis  []    Insulin education  []    Insulin pump notification  []    Medication recommendations  []    Hospital glucose management  []    Jacqueline Sanon  []    Outpatient Education - Information forwarded to SAVO who will follow-up with pt 1 week after discharge    Chart reviewed and initial evaluation complete on Indio Sebastian . Indio Sebastian is a 58 yo female with a past medical history of  DM type 2, per Dr. Evangelista Trivedi MD's H&P dated 10/5/2017. Outpatient medications per past medical records   1.  Metformin  mg daily        Lab Results   Component Value Date/Time    Hemoglobin A1c 6.5 10/06/2017 03:48 AM         Assessed and provided patient verbal and/or written information on the following  · Diabetes: disease process   · Blood sugar goals   · Causes of high / low blood glucose and treatment  · Lab results: A1C - target   · Blood sugar monitoring  · Site rotation  · Use of insulin pen  · Self-injection of insulin   · Use of sliding scale / correction formula  · Use of insulin to carbohydrate ratio  · Sharps disposal  · Sick day guidelines  · Meal planning:   · Activity guidelines   · Foot care  · Chronic complications and Standards of Care  · Delayed healing      Encouraged the following:   · increased exercise  · dietary modifications   -Eat 3 meals a day   -Eat a protein at each meal      Provided patient with the following: [x]    Diabetes Self-Care Guide                [x]    Outpatient DTC contact number               []    Glucometer                []    Insulin Education Guide               []    Carbohydrate Counting Guide               [x]    Sample meal plan                 []    Chair exercises guide               []    [employer] Product Catalog  Thank you.     Hoa Venegas, Certified Diabetes Educator    [phone]

## 2017-10-06 NOTE — ROUTINE PROCESS
Bedside shift change report given to [address] [place] [address] (oncoming nurse) by Kalina Ball (offgoing nurse). Report included the following information SBAR, Kardex and MAR.

## 2017-10-06 NOTE — PROGRESS NOTES
Problem: Falls - Risk of  Goal: *Absence of Falls  Document Tamara Fall Risk and appropriate interventions in the flowsheet. Outcome: Progressing Towards Goal  Fall Risk Interventions:   family in room, bed alarm engaged, verbalizes understanding to call for assistance when desire to ambulate.

## 2017-10-06 NOTE — PROGRESS NOTES
Spiritual Care Partner Volunteer visited patient in 80 on 10.6. 17.   Documented by:    [id number] Madhu De Luna M.Div M.S, aCt [place]9 available at [facility]([id number])

## 2017-10-06 NOTE — H&P
[address]  [phone]    Hospitalist Admission Note      NAME:  Raul Cummins   :   1955   MRN:  [id number]     PCP:  Prince Marcus MD     Date/Time:  10/5/2017 9:41 PM          Subjective:     CHIEF COMPLAINT: Back pain     HISTORY OF PRESENT ILLNESS:     Ms. Juancho Avalos is a 57 y.o.  female who presented to the Emergency Department complaining of back pain. Patient is quiet but awake, letting her daughter speak for her. Pain has worsened over 5 days. Seen by ER, given Abx for presumed UTI. Now associated with vomiting. ER workup CT shows possible DDD of spine. She meets NO SIRS criteria. We will admit her for observation, as she cannot keep down PO antibiotics tonight. Past Medical History:   Diagnosis Date    Diabetes ([address]     type 2    Hypercholesteremia     Hypertension     Hypothyroidism         History reviewed. No pertinent surgical history. Social History   Substance Use Topics    Smoking status: Never Smoker    Smokeless tobacco: Never Used    Alcohol use No        History reviewed. No pertinent family history. No Known Allergies     Prior to Admission medications    Medication Sig Start Date End Date Taking? Authorizing Provider   metoprolol succinate (TOPROL-XL) 50 mg XL tablet Take 50 mg by mouth daily. Yes Sharmila Benitez MD   pravastatin (PRAVACHOL) 40 mg tablet Take 40 mg by mouth nightly. Yes Sharmila Benitez MD   levothyroxine (SYNTHROID) 75 mcg tablet Take 75 mcg by mouth Daily (before breakfast). Yes Sharmila Benitez MD   metFORMIN ER (GLUCOPHAGE XR) 750 mg tablet Take 750 mg by mouth daily. Yes Sharmila Benitez MD   ibuprofen (MOTRIN) 600 mg tablet Take 1 Tab by mouth every six (6) hours as needed for Pain. 10/2/17  Yes Keyanna Flores MD   traMADol (ULTRAM) 50 mg tablet Take 1 Tab by mouth every six (6) hours as needed for Pain.  Max Daily Amount: 200 mg. 10/2/17  Yes Keyanna Flores MD nitrofurantoin, macrocrystal-monohydrate, (MACROBID) 100 mg capsule Take 1 Cap by mouth two (2) times a day for 3 days. 10/4/17 10/7/17  Sussy Castellon PA-C       Review of Systems:  Daughter speaks for patient  (bold if positive, if negative)    Gen:  Eyes:  ENT:  CVS:  Pulm:  GI:  nauseaemesis  :    MS:  PainSkin:  Psych:  Endo:    Hem:  Renal:    Neuro:        Objective:      VITALS:    Vital signs reviewed; most recent are:    Visit Vitals    /62 (BP 1 Location: Right arm, BP Patient Position: At rest)    Pulse 69    Temp 98.6 °F (37 °C)    Resp 18    Ht 5' 3\" (1.6 m)    Wt 53.5 kg (118 lb)    SpO2 96%    BMI 20.9 kg/m2     SpO2 Readings from Last 6 Encounters:   10/05/17 96%   10/02/17 97%        No intake or output data in the 24 hours ending 10/05/17 9131     Exam:     Physical Exam:    Gen:  thin, in no acute distress  HEENT:  Pink conjunctivae, PERRL, hearing intact to voice, moist mucous membranes  Neck:  Supple, without masses, thyroid non-tender  Resp:  No accessory muscle use, clear breath sounds without wheezes rales or rhonchi  Card:  No murmurs, normal S1, S2 without thrills, bruits or peripheral edema  Abd:  Soft, non-tender, non-distended, normoactive bowel sounds are present, no mass  Lymph:  No cervical or inguinal adenopathy  Musc:  No cyanosis or clubbing  Skin:  No rashes or ulcers, skin turgor is good  Neuro:  Cranial nerves are grossly intact, no focal motor weakness, follows commands vaguely  Psych:  Good insight, oriented to person, place and time, alert but keeps eyes closed     Labs:    Recent Labs      10/05/17   1544   WBC  8.8   HGB  13.7   HCT  40.7   PLT  178     Recent Labs      10/05/17   1544   NA  135*   K  4.0   CL  96*   CO2  33*   GLU  142*   BUN  9   CREA  0.66   CA  9.0   ALB  4.0   TBILI  0.7   SGOT  21   ALT  20     No results found for: GLUCPOC  No results for input(s): PH, PCO2, PO2, HCO3, FIO2 in the last 72 hours.   Recent Labs      10/05/17   1548 INR  1.0     All Micro Results     None          I have reviewed previous records       Assessment and Plan:      UTI (urinary tract infection) - POA, meets no SIRS criteria. Could DC home on PO Abx (Macrobid), but vomiting prevents that. Ceftriaxone for now. Could DC home prior to cx result, as long as PCP follows cx. Vomiting - POA presumed due to UTI. Trial decadron for back pain and nausea. Hydrate and clear diet, advance as tolerated. Prn zofran or compazine. Back pain - POA presumed due to UTI or spinal DDD. Tylenol, morphine prn. Ortho consult in AM.  Trial some decadron in case edema is driving pain. Resume home ibuprofen and ultram.      Diabetes type 2 without complications - Diabetic diet and counseling when eating. SSI per protocol. Continue home metformin. Check A1c.     Hypertension - continue metoprolol    Hypothyroidism - continue synthroid      Hypercholesteremia - Continue pravastatin      Telemetry reviewed:   normal sinus rhythm    Risk of deterioration: medium      Total time spent with patient: 50 Minutes Signed out to Dr Oziel Davila at [address] discussed with: Patient, Family, Nursing Staff, Consultant/Specialist and >50% of time spent in counseling and coordination of care    Discussed:  Care Plan       ___________________________________________________    Attending Physician: Evangelista Trivedi MD

## 2017-10-06 NOTE — PROGRESS NOTES
CM Note:  Met with pt who was in too much pain to speak to me. She indicated her daughter who provided information for d/c planning. PTA pt was independent with ADL's, was driving and walking. No DME at home. She has never had home health. Her emergency contact is her daughter, Yanet Bowen ([phone]), who will drive her home at d/c. Pt has Rx coverage and gets her medications from [address] [place] [address] in [place]. No anticipated needs for d/c. Home when medically stable. ABDIAZIZ Del Real    Care Management Interventions  PCP Verified by CM:  Yes (PCP is Dr. Ernie Aguilar.)  Palliative Care Criteria Met (RRAT>21 & CHF Dx)?: No  MyChart Signup: No  Discharge Durable Medical Equipment: No  Physical Therapy Consult: No  Occupational Therapy Consult: No  Speech Therapy Consult: No  Current Support Network: Lives with Spouse (Pt lives with her  in a 1 story house with 4 entry steps.)  Confirm Follow Up Transport: Family (Pt's daughter to drive her home at d/c.)  Plan discussed with Pt/Family/Caregiver: Yes  Discharge Location  Discharge Placement: Home with one level

## 2017-10-06 NOTE — PROGRESS NOTES
Bedside shift change report given to Abhilash Alford RN (oncoming nurse) by Ciarra lyons RN (offgoing nurse). Report included the following information SBAR, Kardex and MAR.

## 2017-10-06 NOTE — ED NOTES
Transported by [facility] to [facility] with saline lock intact LAC and headache and back pain feeling a little better.

## 2017-10-06 NOTE — PROGRESS NOTES
Daily Progress Note: 10/6/2017  Wilfrid Goel MD    Assessment/Plan:   UTI (urinary tract infection) Klebsiella- POA, sensitive to Rocephin.       Vomiting - POA presumed due to UTI. Trial decadron for back pain and nausea. Hydrate and clear diet, advance as tolerated. Prn zofran or compazine.     Back pain/Spinal stenosis - Consult Ortho Spine. Tylenol, morphine prn. Trial decadron but wean from 4 mg to 2mg today and cont to wean rapidly. Resume home ibuprofen and ultram and also has Norco.      Diabetes type 2 without complications - Diabetic diet and counseling when eating. SSI per protocol. Continue home metformin. Check A1c.     Hypertension - continue metoprolol     Hypothyroidism - continue synthroid      Hypercholesteremia - Continue pravastatin        Problem List:  Problem List as of 10/6/2017  Date Reviewed: 10/5/2017          Codes Class Noted - Resolved    * (Principal)UTI (urinary tract infection) ICD-10-CM: N39.0  ICD-9-CM: 599.0  10/5/2017 - Present        Hypothyroidism ICD-10-CM: E03.9  ICD-9-CM: 244.9  Unknown - Present        Hypertension ICD-10-CM: I10  ICD-9-CM: 401.9  Unknown - Present        Hypercholesteremia ICD-10-CM: E78.00  ICD-9-CM: 272.0  Unknown - Present        Diabetes ([facility] [address] ICD-10-CM: E11.9  ICD-9-CM: 250.00  Unknown - Present    Overview Signed 10/5/2017  9:40 PM by Magda Stevenson MD     type 2             Back pain ICD-10-CM: M54.9  ICD-9-CM: 724.5  10/5/2017 - Present        Vomiting ICD-10-CM: R11.10  ICD-9-CM: 787.03  10/5/2017 - Present              Subjective:    57 y.o.  female who presented to the Emergency Department complaining of back pain. Patient is quiet but awake, letting her daughter speak for her. Pain has worsened over 5 days. Seen by ER, given Abx for presumed UTI. Now associated with vomiting. ER workup CT shows possible DDD of spine. She meets NO SIRS criteria.   We will admit her for observation, as she cannot keep down PO antibiotics tonight    10/6:  No further vomiting but still not eating and taking po. Will give pt scheduled Zofran for next 24 hrs and then PRN. No ab pain. C/O back pain and CT with DDD/Spinal stenosis. Daughter in room and wants every single problem pt has taken care of now. She insists pts symptoms are not due to UTI. She insists pt have \"more tests\" but does not know what tests she may want. She insists pts vomiting and chills were due to pt \"being out in cold Sat for an hour. \"  Reviewed CT of Ab/Pelvis with pts daughter. Advised pts daughter that Klebsiella growing in urine shows likely UTI but daughter reports Kenneth Mcneal never had any burning so its not that. \"  Discussed with daughter that sx could be from a combination of her CT findings and UTI/illness - pts daughter not accepting of this and wants \"more tests\" but not sure what. Advised pts daughter that in my best medical judgement pts symptoms most likely due to UTI and that we will cont IV Rocephin, treat for nausea, wean the Decadron rapidly (as daughter thinks Anita Cardenassin is making her sicker\"), consult Ortho spine for the spinal stenosis/back pain. Pts daughter is not satisfied with this plan but is unable to offer alternatives other that \"thats not what's wrong with her\" and want \"everyting fixed. \"  Discussed possible tx options for spinal stenosis depending on symptoms. Pts daughter is not accepting of spinal stenosis as a possible cause of the back pain as she states \"this back pain started Sat and she only had minor things before that\" - outpt she was on Tramadol and tylenol for low back pain. Daughter is very unclear about what else she wants done. Spent over 20 min with pt and daughter discussing tx options and possible causes of symptoms. Review of Systems:   A comprehensive review of systems was negative except for that written in the HPI.     Objective:   Physical Exam:     Visit Vitals    /79 (BP 1 Location: Right arm, BP Patient Position: At rest)    Pulse 68    Temp 98.9 °F (37.2 °C)    Resp 19    Ht 5' 3\" (1.6 m)    Wt 53.5 kg (118 lb)    SpO2 95%    BMI 20.9 kg/m2      O2 Device: Room air    Temp (24hrs), Av °F (37.2 °C), Min:98.5 °F (36.9 °C), Max:99.8 °F (37.7 °C)             General:  Alert, cooperative, no distress, appears stated age. Head:  Normocephalic, without obvious abnormality, atraumatic. Eyes:  Conjunctivae/corneas clear. PERRL, EOMs intact. Nose: Nares normal. Septum midline. Mucosa normal. No drainage or sinus tenderness. Throat: Lips, mucosa, and tongue moist..   Neck: Supple, symmetrical, trachea midline, no adenopathy, thyroid: no enlargement/tenderness/nodules, no carotid bruit and no JVD. Back:   No CVA tenderness. No vertebral tenderness. Does not appear to have muscular tenderness. Lungs:   Clear to auscultation bilaterally. Chest wall:  No tenderness or deformity. Heart:  Regular rate and rhythm, S1, S2 normal, no murmur, click, rub or gallop. Abdomen:   Soft, non-tender. Bowel sounds normal. No masses,  No organomegaly. Extremities: no cyanosis or edema. No calf tenderness or cords. Pulses: 2+ and symmetric all extremities. Skin:  turgor normal. No rashes or lesions   Neurologic: CNII-XII intact. Alert and oriented X 3. Fine motor of hands and fingers normal.   equal.  No cogwheeling or rigidity. Gait not tested at this time. Sensation grossly normal to touch.   Gross motor of extremities normal.       Data Review:       Recent Days:  Recent Labs      10/06/17   0348  10/05/17   1544   WBC  5.7  8.8   HGB  11.9  13.7   HCT  36.3  40.7   PLT  172  178     Recent Labs      10/06/17   0348  10/05/17   1544   NA  139  135*   K  3.9  4.0   CL  105  96*   CO2  29  33*   GLU  167*  142*   BUN  10  9   CREA  0.62  0.66   CA  8.4*  9.0   MG  2.1   --    ALB   --   4.0   TBILI   --   0.7   SGOT   --   21   ALT   --   20   INR   --   1.0     No results for input(s): PH, PCO2, PO2, HCO3, FIO2 in the last 72 hours. 24 Hour Results:  Recent Results (from the past 24 hour(s))   CBC WITH AUTOMATED DIFF    Collection Time: 10/05/17  3:44 PM   Result Value Ref Range    WBC 8.8 3.6 - 11.0 K/uL    RBC 4.62 3.80 - 5.20 M/uL    HGB 13.7 11.5 - 16.0 g/dL    HCT 40.7 35.0 - 47.0 %    MCV 88.1 80.0 - 99.0 FL    MCH 29.7 26.0 - 34.0 PG    MCHC 33.7 30.0 - 36.5 g/dL    RDW 12.6 11.5 - 14.5 %    PLATELET 736 026 - 402 K/uL    NEUTROPHILS 74 32 - 75 %    LYMPHOCYTES 16 12 - 49 %    MONOCYTES 10 5 - 13 %    EOSINOPHILS 0 0 - 7 %    BASOPHILS 0 0 - 1 %    ABS. NEUTROPHILS 6.5 1.8 - 8.0 K/UL    ABS. LYMPHOCYTES 1.4 0.8 - 3.5 K/UL    ABS. MONOCYTES 0.9 0.0 - 1.0 K/UL    ABS. EOSINOPHILS 0.0 0.0 - 0.4 K/UL    ABS. BASOPHILS 0.0 0.0 - 0.1 K/UL    DF AUTOMATED     METABOLIC PANEL, COMPREHENSIVE    Collection Time: 10/05/17  3:44 PM   Result Value Ref Range    Sodium 135 (L) 136 - 145 mmol/L    Potassium 4.0 3.5 - 5.1 mmol/L    Chloride 96 (L) 97 - 108 mmol/L    CO2 33 (H) 21 - 32 mmol/L    Anion gap 6 5 - 15 mmol/L    Glucose 142 (H) 65 - 100 mg/dL    BUN 9 6 - 20 MG/DL    Creatinine 0.66 0.55 - 1.02 MG/DL    BUN/Creatinine ratio 14 12 - 20      GFR est AA >60 >60 ml/min/1.73m2    GFR est non-AA >60 >60 ml/min/1.73m2    Calcium 9.0 8.5 - 10.1 MG/DL    Bilirubin, total 0.7 0.2 - 1.0 MG/DL    ALT (SGPT) 20 12 - 78 U/L    AST (SGOT) 21 15 - 37 U/L    Alk.  phosphatase 76 45 - 117 U/L    Protein, total 7.8 6.4 - 8.2 g/dL    Albumin 4.0 3.5 - 5.0 g/dL    Globulin 3.8 2.0 - 4.0 g/dL    A-G Ratio 1.1 1.1 - 2.2     LIPASE    Collection Time: 10/05/17  3:44 PM   Result Value Ref Range    Lipase 162 73 - 393 U/L   LACTIC ACID    Collection Time: 10/05/17  3:44 PM   Result Value Ref Range    Lactic acid 1.4 0.4 - 2.0 MMOL/L   PTT    Collection Time: 10/05/17  3:44 PM   Result Value Ref Range    aPTT 24.7 22.1 - 32.5 sec    aPTT, therapeutic range     58.0 - 77.0 SECS   PROTHROMBIN TIME + INR    Collection Time: 10/05/17  3:44 PM   Result Value Ref Range    INR 1.0 0.9 - 1.1      Prothrombin time 9.3 9.0 - 11.1 sec   URINALYSIS W/MICROSCOPIC    Collection Time: 10/05/17  6:14 PM   Result Value Ref Range    Color YELLOW/STRAW      Appearance CLEAR CLEAR      Specific gravity 1.006 1.003 - 1.030      pH (UA) 7.0 5.0 - 8.0      Protein NEGATIVE  NEG mg/dL    Glucose NEGATIVE  NEG mg/dL    Ketone NEGATIVE  NEG mg/dL    Bilirubin NEGATIVE  NEG      Blood MODERATE (A) NEG      Urobilinogen 0.2 0.2 - 1.0 EU/dL    Nitrites POSITIVE (A) NEG      Leukocyte Esterase SMALL (A) NEG      WBC 0-4 0 - 4 /hpf    RBC 0-5 0 - 5 /hpf    Epithelial cells FEW FEW /lpf    Bacteria 2+ (A) NEG /hpf   CBC W/O DIFF    Collection Time: 10/06/17  3:48 AM   Result Value Ref Range    WBC 5.7 3.6 - 11.0 K/uL    RBC 4.16 3.80 - 5.20 M/uL    HGB 11.9 11.5 - 16.0 g/dL    HCT 36.3 35.0 - 47.0 %    MCV 87.3 80.0 - 99.0 FL    MCH 28.6 26.0 - 34.0 PG    MCHC 32.8 30.0 - 36.5 g/dL    RDW 12.8 11.5 - 14.5 %    PLATELET 886 575 - 210 K/uL   MAGNESIUM    Collection Time: 10/06/17  3:48 AM   Result Value Ref Range    Magnesium 2.1 1.6 - 2.4 mg/dL   METABOLIC PANEL, BASIC    Collection Time: 10/06/17  3:48 AM   Result Value Ref Range    Sodium 139 136 - 145 mmol/L    Potassium 3.9 3.5 - 5.1 mmol/L    Chloride 105 97 - 108 mmol/L    CO2 29 21 - 32 mmol/L    Anion gap 5 5 - 15 mmol/L    Glucose 167 (H) 65 - 100 mg/dL    BUN 10 6 - 20 MG/DL    Creatinine 0.62 0.55 - 1.02 MG/DL    BUN/Creatinine ratio 16 12 - 20      GFR est AA >60 >60 ml/min/1.73m2    GFR est non-AA >60 >60 ml/min/1.73m2    Calcium 8.4 (L) 8.5 - 10.1 MG/DL       Medications reviewed  Current Facility-Administered Medications   Medication Dose Route Frequency    cefTRIAXone (ROCEPHIN) 1 g in 0.9% sodium chloride (MBP/ADV) 50 mL  1 g IntraVENous Q24H    levothyroxine (SYNTHROID) tablet 75 mcg  75 mcg Oral ACB    metFORMIN ER (GLUCOPHAGE XR) tablet 750 mg  750 mg Oral DAILY    metoprolol succinate (TOPROL-XL) XL tablet 50 mg  50 mg Oral DAILY    pravastatin (PRAVACHOL) tablet 40 mg  40 mg Oral QHS    traMADol (ULTRAM) tablet 50 mg  50 mg Oral Q6H PRN    naloxone (NARCAN) injection 0.4 mg  0.4 mg IntraVENous PRN    glucose chewable tablet 16 g  4 Tab Oral PRN    dextrose (D50W) injection syrg 12.5-25 g  12.5-25 g IntraVENous PRN    glucagon (GLUCAGEN) injection 1 mg  1 mg IntraMUSCular PRN    0.9% sodium chloride infusion  125 mL/hr IntraVENous CONTINUOUS    acetaminophen (TYLENOL) tablet 650 mg  650 mg Oral Q4H PRN    HYDROcodone-acetaminophen (NORCO) 5-325 mg per tablet 1 Tab  1 Tab Oral Q4H PRN    diphenhydrAMINE (BENADRYL) capsule 25 mg  25 mg Oral Q4H PRN    ondansetron (ZOFRAN) injection 4 mg  4 mg IntraVENous Q4H PRN    enoxaparin (LOVENOX) injection 40 mg  40 mg SubCUTAneous Q24H    insulin lispro (HUMALOG) injection   SubCUTAneous AC&HS    prochlorperazine (COMPAZINE) injection 5 mg  5 mg IntraVENous Q6H PRN    dexamethasone (DECADRON) 4 mg/mL injection 4 mg  4 mg IntraVENous Q6H     Care Plan discussed with: Patient/Family and Nurse  Total time spent with patient: 30 minutes.   Haris Toscano MD

## 2017-10-06 NOTE — ROUTINE PROCESS
Attempted multiple times to deliver state observation notice but patient resting. Finally able to give notice but patient too groggy to understand explanation or sign. Notice left at bedside.  Luke Wilde, 2020 [facility] specialist, [phone]

## 2017-10-06 NOTE — PROGRESS NOTES
Problem: Urinary Elimination - Impaired  Goal: *Absence/decrease in episodes of urinary incontinence  Outcome: Progressing Towards Goal  Up with assistance; normal amounts of trips to the bathroom.

## 2017-10-06 NOTE — CONSULTS
ORTHOPEDIC CONSULT    Subjective:     Date of Consultation:  October 6, 2017    Referring Physician:  Dr. Ang Ludwig is a 57 y.o. female who was admitted to [facility] after complaining of severe lumbar pain. She was found to have a UTI on previous ER visit and was started on PO antibiotics. She apparently became nauseated and had vomiting. She states that she was at a wedding and was sitting a lot when she felt some lumbar pain. It progressively got worse over the weekend and was worse when she started vomiting. The pain is localized to the lumbar spine without radiation. There is no complaints of bowel or bladder incontinence. No history of lumbar surgery. Patient denies recent fever. She admits she had some chills that made her back pain worse. Patient Active Problem List    Diagnosis Date Noted    UTI (urinary tract infection) 10/05/2017    Back pain 10/05/2017    Vomiting 10/05/2017    Hypothyroidism     Hypertension     Hypercholesteremia     [facility]      History reviewed. No pertinent family history. Social History   Substance Use Topics    Smoking status: Never Smoker    Smokeless tobacco: Never Used    Alcohol use No     Past Medical History:   Diagnosis Date    Diabetes ([facility] [address]     type 2    Hypercholesteremia     Hypertension     Hypothyroidism       History reviewed. No pertinent surgical history. Prior to Admission medications    Medication Sig Start Date End Date Taking? Authorizing Provider   metoprolol succinate (TOPROL-XL) 50 mg XL tablet Take 50 mg by mouth daily. Yes Sharmila Benitez MD   pravastatin (PRAVACHOL) 40 mg tablet Take 40 mg by mouth nightly. Yes Sharmila Benitez MD   levothyroxine (SYNTHROID) 75 mcg tablet Take 75 mcg by mouth Daily (before breakfast). Yes Sharmila Benitez MD   metFORMIN ER (GLUCOPHAGE XR) 750 mg tablet Take 750 mg by mouth daily.    Yes Sharmila Benitez MD   ibuprofen (MOTRIN) 600 mg tablet Take 1 Tab by mouth every six (6) hours as needed for Pain. 10/2/17  Yes Aida Jay MD   traMADol (ULTRAM) 50 mg tablet Take 1 Tab by mouth every six (6) hours as needed for Pain. Max Daily Amount: 200 mg. 10/2/17  Yes Aida Jay MD   nitrofurantoin, macrocrystal-monohydrate, (MACROBID) 100 mg capsule Take 1 Cap by mouth two (2) times a day for 3 days. 10/4/17 10/7/17  Ranjana Bhakta PA-C     Current Facility-Administered Medications   Medication Dose Route Frequency    ondansetron (ZOFRAN ODT) tablet 4 mg  4 mg Oral Q8H    dexamethasone (DECADRON) 4 mg/mL injection 2 mg  2 mg IntraVENous Q6H    cefTRIAXone (ROCEPHIN) 1 g in 0.9% sodium chloride (MBP/ADV) 50 mL  1 g IntraVENous Q24H    levothyroxine (SYNTHROID) tablet 75 mcg  75 mcg Oral ACB    metFORMIN ER (GLUCOPHAGE XR) tablet 750 mg  750 mg Oral DAILY    metoprolol succinate (TOPROL-XL) XL tablet 50 mg  50 mg Oral DAILY    pravastatin (PRAVACHOL) tablet 40 mg  40 mg Oral QHS    traMADol (ULTRAM) tablet 50 mg  50 mg Oral Q6H PRN    naloxone (NARCAN) injection 0.4 mg  0.4 mg IntraVENous PRN    glucose chewable tablet 16 g  4 Tab Oral PRN    dextrose (D50W) injection syrg 12.5-25 g  12.5-25 g IntraVENous PRN    glucagon (GLUCAGEN) injection 1 mg  1 mg IntraMUSCular PRN    0.9% sodium chloride infusion  100 mL/hr IntraVENous CONTINUOUS    acetaminophen (TYLENOL) tablet 650 mg  650 mg Oral Q4H PRN    HYDROcodone-acetaminophen (NORCO) 5-325 mg per tablet 1 Tab  1 Tab Oral Q4H PRN    diphenhydrAMINE (BENADRYL) capsule 25 mg  25 mg Oral Q4H PRN    ondansetron (ZOFRAN) injection 4 mg  4 mg IntraVENous Q4H PRN    enoxaparin (LOVENOX) injection 40 mg  40 mg SubCUTAneous Q24H    insulin lispro (HUMALOG) injection   SubCUTAneous AC&HS    prochlorperazine (COMPAZINE) injection 5 mg  5 mg IntraVENous Q6H PRN     No Known Allergies     Review of Systems:  Pertinent items are noted in HPI.     Objective:     Patient Vitals for the past 8 hrs:   BP Temp Pulse Resp SpO2   10/06/17 1203 165/79 97.9 °F (36.6 °C) (!) 59 20 97 %   10/06/17 0730 172/84 98.7 °F (37.1 °C) 69 19 97 %     Temp (24hrs), Av.8 °F (37.1 °C), Min:97.9 °F (36.6 °C), Max:99.8 °F (37.7 °C)        EXAM: GEN: Well developed and well nurtured. PSYCH:  AAO x 3. MUSC: Lumbar spine without erythema or ecchymosis. There is some mild left and right perispinal spasm. There is no palpable defect noted. She has no pain with ROM of BL hips. L1-L5 sensation is intact.  - SLR. - 90-90 SLR. NVI distally. Negative clonus sign. INDICATION: low back pain radiating to right leg .     COMPARISON: None.     TECHNIQUE: Multislice helical CT of the lumbar spine was performed without  contrast. Sagittal and coronal reformats were generated. CT dose reduction was  achieved through use of a standardized protocol tailored for this examination  and automatic exposure control for dose modulation. Adaptive statistical  iterative reconstruction (ASIR) was utilized.     FINDINGS:  Lumbar alignment is normal. Vertebral body heights are normal. The paraspinal  soft tissues are normal.     T12-L1: No herniation or stenosis. L1-L2: No herniation or stenosis. L2-L3: No herniation or stenosis. L3-L4: Mild facet osteoarthritis and a mild disc bulge cause mild to moderate  canal stenosis. L4-L5: Facet osteoarthritis and a disc bulge cause moderate canal stenosis. L5-S1: Facet osteoarthritis and a disc bulge cause moderate canal stenosis.     IMPRESSION  IMPRESSION: Canal stenosis L3-S1.     Data Review   Recent Results (from the past 24 hour(s))   CBC WITH AUTOMATED DIFF    Collection Time: 10/05/17  3:44 PM   Result Value Ref Range    WBC 8.8 3.6 - 11.0 K/uL    RBC 4.62 3.80 - 5.20 M/uL    HGB 13.7 11.5 - 16.0 g/dL    HCT 40.7 35.0 - 47.0 %    MCV 88.1 80.0 - 99.0 FL    MCH 29.7 26.0 - 34.0 PG    MCHC 33.7 30.0 - 36.5 g/dL    RDW 12.6 11.5 - 14.5 %    PLATELET 277 173 - 831 K/uL    NEUTROPHILS 74 32 - 75 %    LYMPHOCYTES 16 12 - 49 %    MONOCYTES 10 5 - 13 % EOSINOPHILS 0 0 - 7 %    BASOPHILS 0 0 - 1 %    ABS. NEUTROPHILS 6.5 1.8 - 8.0 K/UL    ABS. LYMPHOCYTES 1.4 0.8 - 3.5 K/UL    ABS. MONOCYTES 0.9 0.0 - 1.0 K/UL    ABS. EOSINOPHILS 0.0 0.0 - 0.4 K/UL    ABS. BASOPHILS 0.0 0.0 - 0.1 K/UL    DF AUTOMATED     METABOLIC PANEL, COMPREHENSIVE    Collection Time: 10/05/17  3:44 PM   Result Value Ref Range    Sodium 135 (L) 136 - 145 mmol/L    Potassium 4.0 3.5 - 5.1 mmol/L    Chloride 96 (L) 97 - 108 mmol/L    CO2 33 (H) 21 - 32 mmol/L    Anion gap 6 5 - 15 mmol/L    Glucose 142 (H) 65 - 100 mg/dL    BUN 9 6 - 20 MG/DL    Creatinine 0.66 0.55 - 1.02 MG/DL    BUN/Creatinine ratio 14 12 - 20      GFR est AA >60 >60 ml/min/1.73m2    GFR est non-AA >60 >60 ml/min/1.73m2    Calcium 9.0 8.5 - 10.1 MG/DL    Bilirubin, total 0.7 0.2 - 1.0 MG/DL    ALT (SGPT) 20 12 - 78 U/L    AST (SGOT) 21 15 - 37 U/L    Alk.  phosphatase 76 45 - 117 U/L    Protein, total 7.8 6.4 - 8.2 g/dL    Albumin 4.0 3.5 - 5.0 g/dL    Globulin 3.8 2.0 - 4.0 g/dL    A-G Ratio 1.1 1.1 - 2.2     LIPASE    Collection Time: 10/05/17  3:44 PM   Result Value Ref Range    Lipase 162 73 - 393 U/L   LACTIC ACID    Collection Time: 10/05/17  3:44 PM   Result Value Ref Range    Lactic acid 1.4 0.4 - 2.0 MMOL/L   PTT    Collection Time: 10/05/17  3:44 PM   Result Value Ref Range    aPTT 24.7 22.1 - 32.5 sec    aPTT, therapeutic range     58.0 - 77.0 SECS   PROTHROMBIN TIME + INR    Collection Time: 10/05/17  3:44 PM   Result Value Ref Range    INR 1.0 0.9 - 1.1      Prothrombin time 9.3 9.0 - 11.1 sec   URINALYSIS W/MICROSCOPIC    Collection Time: 10/05/17  6:14 PM   Result Value Ref Range    Color YELLOW/STRAW      Appearance CLEAR CLEAR      Specific gravity 1.006 1.003 - 1.030      pH (UA) 7.0 5.0 - 8.0      Protein NEGATIVE  NEG mg/dL    Glucose NEGATIVE  NEG mg/dL    Ketone NEGATIVE  NEG mg/dL    Bilirubin NEGATIVE  NEG      Blood MODERATE (A) NEG      Urobilinogen 0.2 0.2 - 1.0 EU/dL    Nitrites POSITIVE (A) NEG Leukocyte Esterase SMALL (A) NEG      WBC 0-4 0 - 4 /hpf    RBC 0-5 0 - 5 /hpf    Epithelial cells FEW FEW /lpf    Bacteria 2+ (A) NEG /hpf   HEMOGLOBIN A1C WITH EAG    Collection Time: 10/06/17  3:48 AM   Result Value Ref Range    Hemoglobin A1c 6.5 (H) 4.2 - 6.3 %    Est. average glucose 140 mg/dL   CBC W/O DIFF    Collection Time: 10/06/17  3:48 AM   Result Value Ref Range    WBC 5.7 3.6 - 11.0 K/uL    RBC 4.16 3.80 - 5.20 M/uL    HGB 11.9 11.5 - 16.0 g/dL    HCT 36.3 35.0 - 47.0 %    MCV 87.3 80.0 - 99.0 FL    MCH 28.6 26.0 - 34.0 PG    MCHC 32.8 30.0 - 36.5 g/dL    RDW 12.8 11.5 - 14.5 %    PLATELET 697 418 - 252 K/uL   MAGNESIUM    Collection Time: 10/06/17  3:48 AM   Result Value Ref Range    Magnesium 2.1 1.6 - 2.4 mg/dL   METABOLIC PANEL, BASIC    Collection Time: 10/06/17  3:48 AM   Result Value Ref Range    Sodium 139 136 - 145 mmol/L    Potassium 3.9 3.5 - 5.1 mmol/L    Chloride 105 97 - 108 mmol/L    CO2 29 21 - 32 mmol/L    Anion gap 5 5 - 15 mmol/L    Glucose 167 (H) 65 - 100 mg/dL    BUN 10 6 - 20 MG/DL    Creatinine 0.62 0.55 - 1.02 MG/DL    BUN/Creatinine ratio 16 12 - 20      GFR est AA >60 >60 ml/min/1.73m2    GFR est non-AA >60 >60 ml/min/1.73m2    Calcium 8.4 (L) 8.5 - 10.1 MG/DL   GLUCOSE, POC    Collection Time: 10/06/17  6:25 AM   Result Value Ref Range    Glucose (POC) 168 (H) 65 - 100 mg/dL    Performed by Rebecca Reyes (PCT)    GLUCOSE, POC    Collection Time: 10/06/17 11:10 AM   Result Value Ref Range    Glucose (POC) 119 (H) 65 - 100 mg/dL    Performed by Jose David Goddard  PCT          Assessment/Plan:     A: Lumbar central canal stenosis L3-S1    P: 1. Stenosis: Lumbar pain exacerbated by recent illness and prolonged sitting. I would recommend conservative treatment for this. PT/OT OOB and ambulating as much as possible. Agree with Decadron and then consider prednisone taper for 7 days if ok with primary team.  May add in skelaxin for muscle spasms.   If condition is worsening would recommend MRI to evaluate for MEGHANA candidacy. Follow-up in 7-10 days with Dr. Glendy Wilkerson. Call to make appt. [phone]    Discussed case with Dr. Candy Duckworth who agrees with plan.         Charlee Vora, [place]   Orthopaedic Surgery [address]

## 2017-10-06 NOTE — PROGRESS NOTES
Problem: Falls - Risk of  Goal: *Absence of Falls  Document Tamara Fall Risk and appropriate interventions in the flowsheet.    Outcome: Progressing Towards Goal  Fall Risk Interventions:              Medication Interventions: Patient to call before getting OOB

## 2017-10-07 VITALS
SYSTOLIC BLOOD PRESSURE: 170 MMHG | TEMPERATURE: 98.2 F | HEIGHT: 63 IN | OXYGEN SATURATION: 98 % | HEART RATE: 67 BPM | BODY MASS INDEX: 20.91 KG/M2 | RESPIRATION RATE: 18 BRPM | WEIGHT: 118 LBS | DIASTOLIC BLOOD PRESSURE: 86 MMHG

## 2017-10-07 LAB
ANION GAP SERPL CALC-SCNC: 10 MMOL/L (ref 5–15)
BASOPHILS # BLD: 0 K/UL (ref 0–0.1)
BASOPHILS NFR BLD: 0 % (ref 0–1)
BUN SERPL-MCNC: 14 MG/DL (ref 6–20)
BUN/CREAT SERPL: 18 (ref 12–20)
CALCIUM SERPL-MCNC: 8.4 MG/DL (ref 8.5–10.1)
CHLORIDE SERPL-SCNC: 99 MMOL/L (ref 97–108)
CO2 SERPL-SCNC: 26 MMOL/L (ref 21–32)
CREAT SERPL-MCNC: 0.78 MG/DL (ref 0.55–1.02)
DIFFERENTIAL METHOD BLD: ABNORMAL
EOSINOPHIL # BLD: 0 K/UL (ref 0–0.4)
EOSINOPHIL NFR BLD: 0 % (ref 0–7)
ERYTHROCYTE [DISTWIDTH] IN BLOOD BY AUTOMATED COUNT: 12.6 % (ref 11.5–14.5)
GLUCOSE BLD STRIP.AUTO-MCNC: 150 MG/DL (ref 65–100)
GLUCOSE BLD STRIP.AUTO-MCNC: 151 MG/DL (ref 65–100)
GLUCOSE SERPL-MCNC: 207 MG/DL (ref 65–100)
HCT VFR BLD AUTO: 39.1 % (ref 35–47)
HGB BLD-MCNC: 12.9 G/DL (ref 11.5–16)
LYMPHOCYTES # BLD: 0.8 K/UL (ref 0.8–3.5)
LYMPHOCYTES NFR BLD: 11 % (ref 12–49)
MCH RBC QN AUTO: 28.9 PG (ref 26–34)
MCHC RBC AUTO-ENTMCNC: 33 G/DL (ref 30–36.5)
MCV RBC AUTO: 87.5 FL (ref 80–99)
MONOCYTES # BLD: 0.6 K/UL (ref 0–1)
MONOCYTES NFR BLD: 8 % (ref 5–13)
NEUTS SEG # BLD: 5.9 K/UL (ref 1.8–8)
NEUTS SEG NFR BLD: 81 % (ref 32–75)
PLATELET # BLD AUTO: 202 K/UL (ref 150–400)
POTASSIUM SERPL-SCNC: 4 MMOL/L (ref 3.5–5.1)
RBC # BLD AUTO: 4.47 M/UL (ref 3.8–5.2)
RBC MORPH BLD: ABNORMAL
RBC MORPH BLD: ABNORMAL
SERVICE CMNT-IMP: ABNORMAL
SERVICE CMNT-IMP: ABNORMAL
SODIUM SERPL-SCNC: 135 MMOL/L (ref 136–145)
WBC # BLD AUTO: 7.3 K/UL (ref 3.6–11)

## 2017-10-07 PROCEDURE — 74011250636 HC RX REV CODE- 250/636: Performed by: FAMILY MEDICINE

## 2017-10-07 PROCEDURE — 97162 PT EVAL MOD COMPLEX 30 MIN: CPT

## 2017-10-07 PROCEDURE — 85025 COMPLETE CBC W/AUTO DIFF WBC: CPT | Performed by: FAMILY MEDICINE

## 2017-10-07 PROCEDURE — 74011250637 HC RX REV CODE- 250/637: Performed by: FAMILY MEDICINE

## 2017-10-07 PROCEDURE — 80048 BASIC METABOLIC PNL TOTAL CA: CPT | Performed by: FAMILY MEDICINE

## 2017-10-07 PROCEDURE — 96372 THER/PROPH/DIAG INJ SC/IM: CPT

## 2017-10-07 PROCEDURE — 99218 HC RM OBSERVATION: CPT

## 2017-10-07 PROCEDURE — 97161 PT EVAL LOW COMPLEX 20 MIN: CPT

## 2017-10-07 PROCEDURE — 97530 THERAPEUTIC ACTIVITIES: CPT

## 2017-10-07 PROCEDURE — 74011636637 HC RX REV CODE- 636/637: Performed by: INTERNAL MEDICINE

## 2017-10-07 PROCEDURE — 96376 TX/PRO/DX INJ SAME DRUG ADON: CPT

## 2017-10-07 PROCEDURE — 82962 GLUCOSE BLOOD TEST: CPT

## 2017-10-07 PROCEDURE — 74011250637 HC RX REV CODE- 250/637: Performed by: INTERNAL MEDICINE

## 2017-10-07 PROCEDURE — 36415 COLL VENOUS BLD VENIPUNCTURE: CPT | Performed by: FAMILY MEDICINE

## 2017-10-07 PROCEDURE — 96361 HYDRATE IV INFUSION ADD-ON: CPT

## 2017-10-07 PROCEDURE — 74011250636 HC RX REV CODE- 250/636: Performed by: INTERNAL MEDICINE

## 2017-10-07 PROCEDURE — 97116 GAIT TRAINING THERAPY: CPT

## 2017-10-07 RX ORDER — HYDROCODONE BITARTRATE AND ACETAMINOPHEN 5; 325 MG/1; MG/1
1 TABLET ORAL
Qty: 10 TAB | Refills: 0 | Status: ON HOLD | OUTPATIENT
Start: 2017-10-07 | End: 2017-10-23

## 2017-10-07 RX ORDER — ONDANSETRON 4 MG/1
4 TABLET, ORALLY DISINTEGRATING ORAL
Qty: 12 TAB | Refills: 0 | Status: SHIPPED | OUTPATIENT
Start: 2017-10-07 | End: 2017-10-17

## 2017-10-07 RX ORDER — METHYLPREDNISOLONE 4 MG/1
4 TABLET ORAL
Qty: 1 DOSE PACK | Refills: 0 | Status: SHIPPED | OUTPATIENT
Start: 2017-10-07 | End: 2017-10-13

## 2017-10-07 RX ORDER — DEXAMETHASONE SODIUM PHOSPHATE 4 MG/ML
2 INJECTION, SOLUTION INTRA-ARTICULAR; INTRALESIONAL; INTRAMUSCULAR; INTRAVENOUS; SOFT TISSUE ONCE
Status: COMPLETED | OUTPATIENT
Start: 2017-10-07 | End: 2017-10-07

## 2017-10-07 RX ORDER — CIPROFLOXACIN 500 MG/1
500 TABLET ORAL EVERY 12 HOURS
Status: DISCONTINUED | OUTPATIENT
Start: 2017-10-07 | End: 2017-10-07 | Stop reason: HOSPADM

## 2017-10-07 RX ORDER — ONDANSETRON 2 MG/ML
4 INJECTION INTRAMUSCULAR; INTRAVENOUS
Status: DISCONTINUED | OUTPATIENT
Start: 2017-10-07 | End: 2017-10-07 | Stop reason: HOSPADM

## 2017-10-07 RX ORDER — ONDANSETRON 4 MG/1
4 TABLET, ORALLY DISINTEGRATING ORAL
Status: DISCONTINUED | OUTPATIENT
Start: 2017-10-07 | End: 2017-10-07 | Stop reason: HOSPADM

## 2017-10-07 RX ORDER — CIPROFLOXACIN 500 MG/1
500 TABLET ORAL EVERY 12 HOURS
Qty: 14 TAB | Refills: 0 | Status: SHIPPED | OUTPATIENT
Start: 2017-10-07 | End: 2017-10-13

## 2017-10-07 RX ADMIN — PROCHLORPERAZINE EDISYLATE 5 MG: 5 INJECTION INTRAMUSCULAR; INTRAVENOUS at 00:41

## 2017-10-07 RX ADMIN — DEXAMETHASONE SODIUM PHOSPHATE 2 MG: 4 INJECTION, SOLUTION INTRAMUSCULAR; INTRAVENOUS at 04:47

## 2017-10-07 RX ADMIN — CIPROFLOXACIN HYDROCHLORIDE 500 MG: 500 TABLET, FILM COATED ORAL at 08:56

## 2017-10-07 RX ADMIN — METFORMIN HYDROCHLORIDE 750 MG: 750 TABLET, EXTENDED RELEASE ORAL at 08:08

## 2017-10-07 RX ADMIN — LEVOTHYROXINE SODIUM 75 MCG: 75 TABLET ORAL at 06:28

## 2017-10-07 RX ADMIN — METOPROLOL SUCCINATE 50 MG: 50 TABLET, FILM COATED, EXTENDED RELEASE ORAL at 08:08

## 2017-10-07 RX ADMIN — INSULIN LISPRO 2 UNITS: 100 INJECTION, SOLUTION INTRAVENOUS; SUBCUTANEOUS at 11:37

## 2017-10-07 RX ADMIN — INSULIN LISPRO 2 UNITS: 100 INJECTION, SOLUTION INTRAVENOUS; SUBCUTANEOUS at 08:08

## 2017-10-07 RX ADMIN — HYDROCODONE BITARTRATE AND ACETAMINOPHEN 1 TABLET: 5; 325 TABLET ORAL at 00:41

## 2017-10-07 RX ADMIN — DEXAMETHASONE SODIUM PHOSPHATE 2 MG: 4 INJECTION, SOLUTION INTRAMUSCULAR; INTRAVENOUS at 11:37

## 2017-10-07 RX ADMIN — ENOXAPARIN SODIUM 40 MG: 40 INJECTION SUBCUTANEOUS at 08:08

## 2017-10-07 RX ADMIN — SODIUM CHLORIDE 100 ML/HR: 900 INJECTION, SOLUTION INTRAVENOUS at 00:44

## 2017-10-07 NOTE — DISCHARGE INSTRUCTIONS
Patient Discharge Instructions    Maggi Yusuf / [id number] : 1955    Admitted 10/5/2017 Discharged: 10/7/2017 8:57 AM     ACUTE DIAGNOSES:  UTI (urinary tract infection)    CHRONIC MEDICAL DIAGNOSES:  Problem List as of 10/7/2017  Date Reviewed: 10/5/2017          Codes Class Noted - Resolved    * (Principal)UTI (urinary tract infection) ICD-10-CM: N39.0  ICD-9-CM: 599.0  10/5/2017 - Present        Hypothyroidism ICD-10-CM: E03.9  ICD-9-CM: 244.9  Unknown - Present        Hypertension ICD-10-CM: I10  ICD-9-CM: 401.9  Unknown - Present        Hypercholesteremia ICD-10-CM: E78.00  ICD-9-CM: 272.0  Unknown - Present        Diabetes ([address] ICD-10-CM: E11.9  ICD-9-CM: 250.00  Unknown - Present    Overview Signed 10/5/2017  9:40 PM by Kiera Tavarez MD     type 2             Back pain ICD-10-CM: M54.9  ICD-9-CM: 724.5  10/5/2017 - Present        Vomiting ICD-10-CM: R11.10  ICD-9-CM: 787.03  10/5/2017 - Present              DISCHARGE MEDICATIONS:   · It is important that you take the medication exactly as they are prescribed. · Keep your medication in the bottles provided by the pharmacist and keep a list of the medication names, dosages, and times to be taken in your wallet. · Do not take other medications without consulting your doctor. DIET:  Diabetic Diet    ACTIVITY: Activity as tolerated/ambulate around the house/no driving    ADDITIONAL INFORMATION: If you experience any of the following symptoms then please call your primary care physician or return to the emergency room if you cannot get hold of your doctor: Fever, chills, nausea, vomiting, diarrhea, change in mentation, falling, bleeding, shortness of breath. FOLLOW UP CARE:  Dr. Crescencio Reinoso MD  you are to call and set up an appointment to see them in 2-3 days. Ortho: Follow-up in 7-10 days with Dr. Izabel Beauchamp. Call to make appt.   [phone]    Information obtained by :  I understand that if any problems occur once I am at home I am to contact my physician. I understand and acknowledge receipt of the instructions indicated above.                                                                                                                                            Physician's or R.N.'s Signature                                                                  Date/Time                                                                                                                                              Patient or Representative Signature                                                          Date/Time

## 2017-10-07 NOTE — PROGRESS NOTES
Physical Therapy Goals  Initiated 10/7/2017  1. Patient will move from supine to sit and sit to supine  in bed with supervision/set-up within 7 day(s). 2.  Patient will transfer from bed to chair and chair to bed with supervision/set-up using the least restrictive device within 7 day(s). 3.  Patient will perform sit to stand with supervision/set-up within 7 day(s). 4.  Patient will ambulate with supervision/set-up for 150 feet with the least restrictive device within 7 day(s). 5.  Patient will ascend/descend 4 stairs with 1 handrail(s) with supervision/set-up within 7 day(s). physical Therapy EVALUATION  Patient: Katty Wilson (66 y.o. female)  Date: 10/7/2017  Primary Diagnosis: UTI (urinary tract infection)        Precautions:   WBAT, Fall    ASSESSMENT :  Based on the objective data described below, the patient presents with decreased strength and balance following admission for UTI and low back pain. Patient found to have spinal stenosis, orthopedics has evaluated the patient and initiated a steroid taper and recommend outpatient follow up for MEGHANA. Patient hospital course complications but nausea and vomiting. She was agreeable to PT and vitals stable throughout the session. She was educated on back precautions and log roll technique to assist with body mechanics and pain management due to low back pain. She was able to carry over with functional activity with minimal verbal cuing for reminders. She was able to ambulate in hallway with CGA< no use of an assistive device, demonstrated no loss of balance or instability. She was returned to supine. She is retuning home with family to assist to a 1 story home. Recommend outpatient PT follow up. .    Patient will benefit from skilled intervention to address the above impairments.   Patients rehabilitation potential is considered to be Good  Factors which may influence rehabilitation potential include:   []         None noted  []         Mental ability/status  []         Medical condition  []         Home/family situation and support systems  []         Safety awareness  [x]         Pain tolerance/management  []         Other:      PLAN :  Recommendations and Planned Interventions:  [x]           Bed Mobility Training             []    Neuromuscular Re-Education  [x]           Transfer Training                   []    Orthotic/Prosthetic Training  [x]           Gait Training                         [x]    Modalities  [x]           Therapeutic Exercises           []    Edema Management/Control  [x]           Therapeutic Activities            [x]    Patient and Family Training/Education  []           Other (comment):    Frequency/Duration: Patient will be followed by physical therapy  5 times a week to address goals. Discharge Recommendations: Outpatient  Further Equipment Recommendations for Discharge: none     SUBJECTIVE:   Patient stated Luana Berachels.     OBJECTIVE DATA SUMMARY:   HISTORY:    Past Medical History:   Diagnosis Date    Diabetes ([facility] [address]     type 2    Hypercholesteremia     Hypertension     Hypothyroidism    History reviewed. No pertinent surgical history. Prior Level of Function/Home Situation: see above  Personal factors and/or comorbidities impacting plan of care:     Home Situation  Home Environment: Private residence  # Steps to Enter: 4  Rails to Enter: Yes  One/Two Story Residence: One story  Living Alone: No  Support Systems: Spouse/Significant Other/Partner, Family member(s)  Patient Expects to be Discharged to[de-identified] Private residence  Current DME Used/Available at Home: Adaptive bathing aides    EXAMINATION/PRESENTATION/DECISION MAKING:   Critical Behavior:  Neurologic State: Alert, Appropriate for age  Orientation Level: Oriented X4  Cognition: Appropriate for age attention/concentration, Appropriate safety awareness, Appropriate decision making     Hearing:   Auditory  Auditory Impairment: None  Skin:  All exposed intact  Edema: none noted  Range Of Motion:  AROM: Within functional limits           PROM: Within functional limits           Strength:    Strength: Within functional limits                    Tone & Sensation:   Tone: Normal              Sensation: Intact               Coordination:  Coordination: Within functional limits  Vision:      Functional Mobility:  Bed Mobility:  Rolling: Additional time;Supervision (verbal cuing for log roll)  Supine to Sit: Additional time;Supervision        Transfers:  Sit to Stand: Contact guard assistance; Additional time  Stand to Sit: Contact guard assistance;Assist x1;Additional time        Bed to Chair: Contact guard assistance              Balance:   Sitting: Intact  Standing: With support  Ambulation/Gait Training:  Distance (ft): 100 Feet (ft)  Assistive Device: Gait belt  Ambulation - Level of Assistance: Stand-by asssistance     Gait Description (WDL): Exceptions to WDL  Gait Abnormalities:  (decreased obstacle negotiation)                                       Stairs: Therapeutic Exercises:       Functional Measure:  Barthel Index:    Bathin  Bladder: 10  Bowels: 10  Groomin  Dressin  Feeding: 10  Mobility: 10  Stairs: 5  Toilet Use: 10  Transfer (Bed to Chair and Back): 15  Total: 85       Barthel and G-code impairment scale:  Percentage of impairment CH  0% CI  1-19% CJ  20-39% CK  40-59% CL  60-79% CM  80-99% CN  100%   Barthel Score 0-100 100 99-80 79-60 59-40 20-39 1-19   0   Barthel Score 0-20 20 17-19 13-16 9-12 5-8 1-4 0      The Barthel ADL Index: Guidelines  1. The index should be used as a record of what a patient does, not as a record of what a patient could do. 2. The main aim is to establish degree of independence from any help, physical or verbal, however minor and for whatever reason. 3. The need for supervision renders the patient not independent. 4. A patient's performance should be established using the best available evidence.  Asking the patient, friends/relatives and nurses are the usual sources, but direct observation and common sense are also important. However direct testing is not needed. 5. Usually the patient's performance over the preceding 24-48 hours is important, but occasionally longer periods will be relevant. 6. Middle categories imply that the patient supplies over 50 per cent of the effort. 7. Use of aids to be independent is allowed. Darryle Chant., Barthel, D.W. (1825). Functional evaluation: the Barthel Index. [address] [facility] (14)2. Kyle Neville reji ROGER Rosenthal, Jeanmarie Oviedo., Shaun Moreno., [place], [address] [facility] (1999). Measuring the change indisability after inpatient rehabilitation; comparison of the responsiveness of the Barthel Index and Functional Avery Measure. Journal of Neurology, Neurosurgery, and Psychiatry, 66(4), 815-216. DEMETRI Young, FRANK Andrews, & Clara Hicks M.A. (2004.) Assessment of post-stroke quality of life in cost-effectiveness studies: The usefulness of the Barthel Index and the EuroQoL-5D. Quality of Life Research, 13, 656-02       G codes: In compliance with CMSs Claims Based Outcome Reporting, the following G-code set was chosen for this patient based on their primary functional limitation being treated: The outcome measure chosen to determine the severity of the functional limitation was the Barthel Index with a score of 85/100 which was correlated with the impairment scale.     ? Mobility - Walking and Moving Around:     - CURRENT STATUS: CI - 1%-19% impaired, limited or restricted    - GOAL STATUS: CI - 1%-19% impaired, limited or restricted    - D/C STATUS:  ---------------To be determined---------------      Physical Therapy Evaluation Charge Determination   History Examination Presentation Decision-Making   MEDIUM  Complexity : 1-2 comorbidities / personal factors will impact the outcome/ POC  MEDIUM Complexity : 3 Standardized tests and measures addressing body structure, function, activity limitation and / or participation in recreation  LOW Complexity : Stable, uncomplicated  Other outcome measures Barthel Index  LOW       Based on the above components, the patient evaluation is determined to be of the following complexity level: LOW     Pain:  Pain Scale 1: Numeric (0 - 10)  Pain Intensity 1: 0              Activity Tolerance:   Good- no complications with upright activity  Please refer to the flowsheet for vital signs taken during this treatment. After treatment:   []         Patient left in no apparent distress sitting up in chair  [x]         Patient left in no apparent distress in bed  [x]         Call bell left within reach  []         Nursing notified  [x]         Caregiver present  [x]         Bed alarm activated    COMMUNICATION/EDUCATION:   The patients plan of care was discussed with: Registered Nurse. [x]         Fall prevention education was provided and the patient/caregiver indicated understanding. [x]         Patient/family have participated as able in goal setting and plan of care. [x]         Patient/family agree to work toward stated goals and plan of care. []         Patient understands intent and goals of therapy, but is neutral about his/her participation. []         Patient is unable to participate in goal setting and plan of care.     Thank you for this referral.  Daniel Monroy, PT, DPT   Time Calculation: 17 mins

## 2017-10-07 NOTE — DISCHARGE SUMMARY
Physician Discharge Summary     Patient ID:    Rizwan Arreaga  [id number]  23 y.o.  1955  Kiara Ha MD    Admit date: 10/5/2017    Discharge date and time: 10/7/2017    Admission Diagnoses: UTI (urinary tract infection)    Chronic Diagnoses:    Problem List as of 10/7/2017  Date Reviewed: 10/5/2017          Codes Class Noted - Resolved    * (Principal)UTI (urinary tract infection) ICD-10-CM: N39.0  ICD-9-CM: 599.0  10/5/2017 - Present        Hypothyroidism ICD-10-CM: E03.9  ICD-9-CM: 244.9  Unknown - Present        Hypertension ICD-10-CM: I10  ICD-9-CM: 401.9  Unknown - Present        Hypercholesteremia ICD-10-CM: E78.00  ICD-9-CM: 272.0  Unknown - Present        Diabetes ([address] ICD-10-CM: E11.9  ICD-9-CM: 250.00  Unknown - Present    Overview Signed 10/5/2017  9:40 PM by Cira Ocampo MD     type 2             Back pain ICD-10-CM: M54.9  ICD-9-CM: 724.5  10/5/2017 - Present        Vomiting ICD-10-CM: R11.10  ICD-9-CM: 787.03  10/5/2017 - Present              Discharge Medications:   Current Discharge Medication List      START taking these medications    Details   ciprofloxacin HCl (CIPRO) 500 mg tablet Take 1 Tab by mouth every twelve (12) hours. Qty: 14 Tab, Refills: 0      HYDROcodone-acetaminophen (NORCO) 5-325 mg per tablet Take 1 Tab by mouth every four (4) hours as needed. Max Daily Amount: 6 Tabs. PRN severe pain  Qty: 10 Tab, Refills: 0      ondansetron (ZOFRAN ODT) 4 mg disintegrating tablet Take 1 Tab by mouth every six (6) hours as needed. Qty: 12 Tab, Refills: 0      methylPREDNISolone (MEDROL, BREANNE,) 4 mg tablet Take 1 Tab by mouth Specific Days and Specific Times. Qty: 1 Dose Pack, Refills: 0         CONTINUE these medications which have NOT CHANGED    Details   metoprolol succinate (TOPROL-XL) 50 mg XL tablet Take 50 mg by mouth daily. pravastatin (PRAVACHOL) 40 mg tablet Take 40 mg by mouth nightly.       levothyroxine (SYNTHROID) 75 mcg tablet Take 75 mcg by mouth Daily (before breakfast). metFORMIN ER (GLUCOPHAGE XR) 750 mg tablet Take 750 mg by mouth daily. STOP taking these medications       ibuprofen (MOTRIN) 600 mg tablet Comments:   Reason for Stopping:         traMADol (ULTRAM) 50 mg tablet Comments:   Reason for Stopping:         nitrofurantoin, macrocrystal-monohydrate, (MACROBID) 100 mg capsule Comments:   Reason for Stopping: Follow up Care:    1. Edmar Antoine MD in 2-3 days  2. Dr Melvina Miller in 7-10D    Diet:  Diabetic Diet    Disposition:  Home. Advanced Directive:    Discharge Exam:  [See today's progress note.]    CONSULTATIONS: Orthopedic Surgery    Significant Diagnostic Studies:   Recent Labs      10/06/17   0348  10/05/17   1544   WBC  5.7  8.8   HGB  11.9  13.7   HCT  36.3  40.7   PLT  172  178     Recent Labs      10/06/17   0348  10/05/17   1544   NA  139  135*   K  3.9  4.0   CL  105  96*   CO2  29  33*   BUN  10  9   CREA  0.62  0.66   GLU  167*  142*   CA  8.4*  9.0   MG  2.1   --      Recent Labs      10/05/17   1544   SGOT  21   AP  76   TP  7.8   ALB  4.0   GLOB  3.8   LPSE  162     Recent Labs      10/05/17   1544   INR  1.0   PTP  9.3   APTT  24.7      No results for input(s): FE, TIBC, PSAT, FERR in the last 72 hours. No results for input(s): PH, PCO2, PO2 in the last 72 hours. No results for input(s): CPK, CKMB in the last 72 hours. No lab exists for component: TROPONINI  No components found for: GLPOC  No results found for: TSH, TSH2, TSH3, TSHP, TSHELE, TT3, T3U, T3UP, FRT3, FT3, FT4, FT4P, T4, T4P, FT4T, TT7, TSHEXT      HOSPITAL COURSE & TREATMENT RENDERED:   1. Pt was admitted for nausea, UTI and intractable back pain. She was dx with spinal stenosis and klebsiella UTI. IV steroids and antiemetics helped. She was transitioned to PO abx, steroids and antiemetics. Improved with mobility. Will see ortho [Dr Barnes] as outpt. She was improved at the time of discharge.       Signed:  Edmar Antoine, MD  10/7/2017  8:58 AM

## 2017-10-07 NOTE — PROGRESS NOTES
Problem: Falls - Risk of  Goal: *Absence of Falls  Document Tamara Fall Risk and appropriate interventions in the flowsheet.    Outcome: Progressing Towards Goal  Fall Risk Interventions:              Medication Interventions: Teach patient to arise slowly, Patient to call before getting OOB

## 2017-10-07 NOTE — ROUTINE PROCESS
Bedside and Verbal shift change report given to Olla Simmonds, RN (oncoming nurse) by ABDIAZIZ Horne (offgoing nurse). Report included the following information SBAR and Kardex.

## 2017-10-07 NOTE — ROUTINE PROCESS
Gone over discharge instructions with patient and daughter. Patient signed paper copy with no addition questions. Leaving with daughter.

## 2017-10-07 NOTE — PROGRESS NOTES
Daily Progress and Discharge Note: 10/7/2017  Emily Beck MD    Assessment/Plan:   UTI (urinary tract infection) Klebsiella- POA, transition rocephin to cipro     Vomiting - POA presumed due to UTI. Trial decadron for back pain and nausea. Hydrate and clear diet, advance today. Transition to PO zofran.     Back pain/Spinal stenosis - Ortho agreed with steroid taper. Will eval outpt for MEGHANA. Pain is much improved. Norco as last resort for pain. Out of bed and off floor today.      Diabetes type 2 without complications - Diabetic diet and counseling when eating. SSI per protocol. Continue home metformin. A1c 6.5     Hypertension - continue metoprolol     Hypothyroidism - continue synthroid      Hypercholesteremia - Continue pravastatin     Home later today if tolerating PO meds. Problem List:  Problem List as of 10/7/2017  Date Reviewed: 10/5/2017          Codes Class Noted - Resolved    * (Principal)UTI (urinary tract infection) ICD-10-CM: N39.0  ICD-9-CM: 599.0  10/5/2017 - Present        Hypothyroidism ICD-10-CM: E03.9  ICD-9-CM: 244.9  Unknown - Present        Hypertension ICD-10-CM: I10  ICD-9-CM: 401.9  Unknown - Present        Hypercholesteremia ICD-10-CM: E78.00  ICD-9-CM: 272.0  Unknown - Present        Diabetes ([facility] [address] ICD-10-CM: E11.9  ICD-9-CM: 250.00  Unknown - Present    Overview Signed 10/5/2017  9:40 PM by Mary Pineda MD     type 2             Back pain ICD-10-CM: M54.9  ICD-9-CM: 724.5  10/5/2017 - Present        Vomiting ICD-10-CM: R11.10  ICD-9-CM: 787.03  10/5/2017 - Present              Subjective:    57 y.o.  female who presented to the Emergency Department complaining of back pain. Patient is quiet but awake, letting her daughter speak for her. Pain has worsened over 5 days. Seen by ER, given Abx for presumed UTI. Now associated with vomiting. ER workup CT shows possible DDD of spine. She meets NO SIRS criteria.   We will admit her for observation, as she cannot keep down PO antibiotics tonight    10/6:  No further vomiting but still not eating and taking po. Will give pt scheduled Zofran for next 24 hrs and then PRN. No ab pain. C/O back pain and CT with DDD/Spinal stenosis. Daughter in room and wants every single problem pt has taken care of now. She insists pts symptoms are not due to UTI. She insists pt have \"more tests\" but does not know what tests she may want. She insists pts vomiting and chills were due to pt \"being out in cold Sat for an hour. \"  Reviewed CT of Ab/Pelvis with pts daughter. Advised pts daughter that Klebsiella growing in urine shows likely UTI but daughter reports Rain Song never had any burning so its not that. \"  Discussed with daughter that sx could be from a combination of her CT findings and UTI/illness - pts daughter not accepting of this and wants \"more tests\" but not sure what. Advised pts daughter that in my best medical judgement pts symptoms most likely due to UTI and that we will cont IV Rocephin, treat for nausea, wean the Decadron rapidly (as daughter thinks Rosa M Del Catsillo is making her sicker\"), consult Ortho spine for the spinal stenosis/back pain. Pts daughter is not satisfied with this plan but is unable to offer alternatives other that \"thats not what's wrong with her\" and want \"everyting fixed. \"  Discussed possible tx options for spinal stenosis depending on symptoms. Pts daughter is not accepting of spinal stenosis as a possible cause of the back pain as she states \"this back pain started Sat and she only had minor things before that\" - outpt she was on Tramadol and tylenol for low back pain. Daughter is very unclear about what else she wants done. Spent over 20 min with pt and daughter discussing tx options and possible causes of symptoms. 10/7:  Ortho saw pt and agreed with quick decadron taper to steroids. Will see her as outpt to eval for need for MEGHANA.   No pain this AM.  Tolerated broth yesterday, only vommitted once, although nausea is improved this AM and she is hungry. Wants to get up. Also thinks she would do better at home as she can't sleep here. Review of Systems:   A comprehensive review of systems was negative except for that written in the HPI. Objective:   Physical Exam:     Visit Vitals    /82 (BP 1 Location: Right arm, BP Patient Position: At rest)    Pulse 66    Temp 98.3 °F (36.8 °C)    Resp 18    Ht 5' 3\" (1.6 m)    Wt 53.5 kg (118 lb)    SpO2 98%    BMI 20.9 kg/m2      O2 Device: Room air    Temp (24hrs), Av.4 °F (36.9 °C), Min:97.9 °F (36.6 °C), Max:98.9 °F (37.2 °C)        10/05 1901 - 10/07 0700  In: 1189.6 [I.V.:1189.6]  Out: -     General:  Alert, cooperative, no distress, appears stated age. Head:  Normocephalic, without obvious abnormality, atraumatic. Eyes:  Conjunctivae/corneas clear. PERRL, EOMs intact. Nose: Nares normal. Septum midline. Throat: Lips, mucosa, and tongue moist..   Neck: Supple, symmetrical, trachea midline, no adenopathy, thyroid: no enlargement/tenderness/nodules, no carotid bruit and no JVD. Back:   No CVA tenderness. No vertebral tenderness. Mild low back pain   Lungs:   Clear to auscultation bilaterally. Chest wall:  No tenderness or deformity. Heart:  Regular rate and rhythm, S1, S2 normal, no murmur, click, rub or gallop. Abdomen:   Soft, non-tender. Bowel sounds normal. No masses,  No organomegaly. Extremities: no cyanosis or edema. No calf tenderness or cords. Pulses: 2+ and symmetric all extremities. Skin:  turgor normal. No rashes or lesions   Neurologic: CNII-XII intact. Alert and oriented X 3. Fine motor of hands and fingers normal.   equal.  Gait not tested at this time. Sensation grossly normal to touch.   Gross motor of extremities normal.       Data Review:       Recent Days:  Recent Labs      10/06/17   0348  10/05/17   1544   WBC  5.7  8.8   HGB  11.9  13.7   HCT  36.3  40.7   PLT  172 178     Recent Labs      10/06/17   0348  10/05/17   1544   NA  139  135*   K  3.9  4.0   CL  105  96*   CO2  29  33*   GLU  167*  142*   BUN  10  9   CREA  0.62  0.66   CA  8.4*  9.0   MG  2.1   --    ALB   --   4.0   TBILI   --   0.7   SGOT   --   21   ALT   --   20   INR   --   1.0     No results for input(s): PH, PCO2, PO2, HCO3, FIO2 in the last 72 hours.     24 Hour Results:  Recent Results (from the past 24 hour(s))   GLUCOSE, POC    Collection Time: 10/06/17 11:10 AM   Result Value Ref Range    Glucose (POC) 119 (H) 65 - 100 mg/dL    Performed by Jeannette Polo  PCT    GLUCOSE, POC    Collection Time: 10/06/17  4:37 PM   Result Value Ref Range    Glucose (POC) 124 (H) 65 - 100 mg/dL    Performed by Bijan Sheets (PCT)    GLUCOSE, POC    Collection Time: 10/06/17  9:18 PM   Result Value Ref Range    Glucose (POC) 157 (H) 65 - 100 mg/dL    Performed by Harinder Frost (PCT)    GLUCOSE, POC    Collection Time: 10/07/17  7:00 AM   Result Value Ref Range    Glucose (POC) 150 (H) 65 - 100 mg/dL    Performed by Harinder Frost (PCT)        Medications reviewed  Current Facility-Administered Medications   Medication Dose Route Frequency    dexamethasone (DECADRON) 4 mg/mL injection 2 mg  2 mg IntraVENous Q6H    cefTRIAXone (ROCEPHIN) 1 g in 0.9% sodium chloride (MBP/ADV) 50 mL  1 g IntraVENous Q24H    levothyroxine (SYNTHROID) tablet 75 mcg  75 mcg Oral ACB    metFORMIN ER (GLUCOPHAGE XR) tablet 750 mg  750 mg Oral DAILY    metoprolol succinate (TOPROL-XL) XL tablet 50 mg  50 mg Oral DAILY    pravastatin (PRAVACHOL) tablet 40 mg  40 mg Oral QHS    traMADol (ULTRAM) tablet 50 mg  50 mg Oral Q6H PRN    naloxone (NARCAN) injection 0.4 mg  0.4 mg IntraVENous PRN    glucose chewable tablet 16 g  4 Tab Oral PRN    dextrose (D50W) injection syrg 12.5-25 g  12.5-25 g IntraVENous PRN    glucagon (GLUCAGEN) injection 1 mg  1 mg IntraMUSCular PRN    0.9% sodium chloride infusion  100 mL/hr IntraVENous CONTINUOUS    acetaminophen (TYLENOL) tablet 650 mg  650 mg Oral Q4H PRN    HYDROcodone-acetaminophen (NORCO) 5-325 mg per tablet 1 Tab  1 Tab Oral Q4H PRN    diphenhydrAMINE (BENADRYL) capsule 25 mg  25 mg Oral Q4H PRN    ondansetron (ZOFRAN) injection 4 mg  4 mg IntraVENous Q4H PRN    enoxaparin (LOVENOX) injection 40 mg  40 mg SubCUTAneous Q24H    insulin lispro (HUMALOG) injection   SubCUTAneous AC&HS    prochlorperazine (COMPAZINE) injection 5 mg  5 mg IntraVENous Q6H PRN     Care Plan discussed with: Patient/Family and Nurse  Total time spent with patient: 40 minutes.   Marly Lovell MD

## 2017-10-10 ENCOUNTER — HOSPITAL ENCOUNTER (OUTPATIENT)
Age: 62
Setting detail: OBSERVATION
Discharge: HOME | DRG: 092 | End: 2017-10-13
Attending: EMERGENCY MEDICINE | Admitting: NEUROLOGICAL SURGERY
Payer: COMMERCIAL

## 2017-10-10 ENCOUNTER — APPOINTMENT (OUTPATIENT)
Dept: MRI IMAGING | Age: 62
DRG: 092 | End: 2017-10-10
Attending: NURSE PRACTITIONER
Payer: COMMERCIAL

## 2017-10-10 ENCOUNTER — APPOINTMENT (OUTPATIENT)
Dept: CT IMAGING | Age: 62
DRG: 092 | End: 2017-10-10
Attending: NURSE PRACTITIONER
Payer: COMMERCIAL

## 2017-10-10 PROBLEM — G95.89 MASS OF SPINAL CORD (HCC): Status: ACTIVE | Noted: 2017-10-10

## 2017-10-10 LAB
APPEARANCE UR: CLEAR
BACTERIA URNS QL MICRO: NEGATIVE /HPF
BILIRUB UR QL: NEGATIVE
COLOR UR: ABNORMAL
EPITH CASTS URNS QL MICRO: ABNORMAL /LPF
GLUCOSE UR STRIP.AUTO-MCNC: 100 MG/DL
HGB UR QL STRIP: NEGATIVE
KETONES UR QL STRIP.AUTO: NEGATIVE MG/DL
LEUKOCYTE ESTERASE UR QL STRIP.AUTO: NEGATIVE
NITRITE UR QL STRIP.AUTO: NEGATIVE
PH UR STRIP: 6.5 [PH] (ref 5–8)
PROT UR STRIP-MCNC: NEGATIVE MG/DL
RBC #/AREA URNS HPF: ABNORMAL /HPF (ref 0–5)
SP GR UR REFRACTOMETRY: 1.02 (ref 1–1.03)
UR CULT HOLD, URHOLD: NORMAL
UROBILINOGEN UR QL STRIP.AUTO: 0.2 EU/DL (ref 0.2–1)
WBC URNS QL MICRO: ABNORMAL /HPF (ref 0–4)

## 2017-10-10 PROCEDURE — 70496 CT ANGIOGRAPHY HEAD: CPT

## 2017-10-10 PROCEDURE — 77030011943

## 2017-10-10 PROCEDURE — 72157 MRI CHEST SPINE W/O & W/DYE: CPT

## 2017-10-10 PROCEDURE — 99218 HC RM OBSERVATION: CPT

## 2017-10-10 PROCEDURE — 81001 URINALYSIS AUTO W/SCOPE: CPT | Performed by: EMERGENCY MEDICINE

## 2017-10-10 PROCEDURE — 74011636320 HC RX REV CODE- 636/320: Performed by: EMERGENCY MEDICINE

## 2017-10-10 PROCEDURE — 74011000258 HC RX REV CODE- 258: Performed by: EMERGENCY MEDICINE

## 2017-10-10 PROCEDURE — 72156 MRI NECK SPINE W/O & W/DYE: CPT

## 2017-10-10 PROCEDURE — 51798 US URINE CAPACITY MEASURE: CPT

## 2017-10-10 PROCEDURE — 74011250636 HC RX REV CODE- 250/636: Performed by: EMERGENCY MEDICINE

## 2017-10-10 PROCEDURE — 72158 MRI LUMBAR SPINE W/O & W/DYE: CPT

## 2017-10-10 PROCEDURE — A9576 INJ PROHANCE MULTIPACK: HCPCS | Performed by: EMERGENCY MEDICINE

## 2017-10-10 PROCEDURE — 96361 HYDRATE IV INFUSION ADD-ON: CPT

## 2017-10-10 PROCEDURE — 65660000000 HC RM CCU STEPDOWN

## 2017-10-10 PROCEDURE — 74011250636 HC RX REV CODE- 250/636: Performed by: NURSE PRACTITIONER

## 2017-10-10 PROCEDURE — 74011250637 HC RX REV CODE- 250/637: Performed by: NURSE PRACTITIONER

## 2017-10-10 PROCEDURE — 96374 THER/PROPH/DIAG INJ IV PUSH: CPT

## 2017-10-10 PROCEDURE — 99284 EMERGENCY DEPT VISIT MOD MDM: CPT

## 2017-10-10 PROCEDURE — 70553 MRI BRAIN STEM W/O & W/DYE: CPT

## 2017-10-10 RX ORDER — HYDROCODONE BITARTRATE AND ACETAMINOPHEN 5; 325 MG/1; MG/1
1 TABLET ORAL
Status: DISCONTINUED | OUTPATIENT
Start: 2017-10-10 | End: 2017-10-13 | Stop reason: HOSPADM

## 2017-10-10 RX ORDER — PRAVASTATIN SODIUM 40 MG/1
40 TABLET ORAL
Status: DISCONTINUED | OUTPATIENT
Start: 2017-10-10 | End: 2017-10-13 | Stop reason: HOSPADM

## 2017-10-10 RX ORDER — HYDRALAZINE HYDROCHLORIDE 20 MG/ML
10 INJECTION INTRAMUSCULAR; INTRAVENOUS
Status: DISCONTINUED | OUTPATIENT
Start: 2017-10-10 | End: 2017-10-12

## 2017-10-10 RX ORDER — BISACODYL 5 MG
5 TABLET, DELAYED RELEASE (ENTERIC COATED) ORAL DAILY PRN
Status: DISCONTINUED | OUTPATIENT
Start: 2017-10-10 | End: 2017-10-13 | Stop reason: HOSPADM

## 2017-10-10 RX ORDER — ONDANSETRON 4 MG/1
4 TABLET, ORALLY DISINTEGRATING ORAL
Status: DISCONTINUED | OUTPATIENT
Start: 2017-10-10 | End: 2017-10-13 | Stop reason: HOSPADM

## 2017-10-10 RX ORDER — INSULIN LISPRO 100 [IU]/ML
INJECTION, SOLUTION INTRAVENOUS; SUBCUTANEOUS EVERY 6 HOURS
Status: DISCONTINUED | OUTPATIENT
Start: 2017-10-10 | End: 2017-10-11

## 2017-10-10 RX ORDER — DEXTROSE 50 % IN WATER (D50W) INTRAVENOUS SYRINGE
12.5-25 AS NEEDED
Status: DISCONTINUED | OUTPATIENT
Start: 2017-10-10 | End: 2017-10-12 | Stop reason: SDUPTHER

## 2017-10-10 RX ORDER — LEVOTHYROXINE SODIUM 150 UG/1
75 TABLET ORAL
Status: DISCONTINUED | OUTPATIENT
Start: 2017-10-11 | End: 2017-10-13 | Stop reason: HOSPADM

## 2017-10-10 RX ORDER — ACETAMINOPHEN 325 MG/1
650 TABLET ORAL
Status: DISCONTINUED | OUTPATIENT
Start: 2017-10-10 | End: 2017-10-13 | Stop reason: HOSPADM

## 2017-10-10 RX ORDER — MAGNESIUM SULFATE 100 %
4 CRYSTALS MISCELLANEOUS AS NEEDED
Status: DISCONTINUED | OUTPATIENT
Start: 2017-10-10 | End: 2017-10-12 | Stop reason: SDUPTHER

## 2017-10-10 RX ORDER — HYDROMORPHONE HYDROCHLORIDE 1 MG/ML
0.5 INJECTION, SOLUTION INTRAMUSCULAR; INTRAVENOUS; SUBCUTANEOUS
Status: DISCONTINUED | OUTPATIENT
Start: 2017-10-10 | End: 2017-10-13 | Stop reason: HOSPADM

## 2017-10-10 RX ORDER — SODIUM CHLORIDE 9 MG/ML
75 INJECTION, SOLUTION INTRAVENOUS CONTINUOUS
Status: DISCONTINUED | OUTPATIENT
Start: 2017-10-10 | End: 2017-10-11

## 2017-10-10 RX ORDER — ALPRAZOLAM 0.25 MG/1
.125-.25 TABLET ORAL
COMMUNITY
End: 2019-06-18 | Stop reason: ALTCHOICE

## 2017-10-10 RX ORDER — DEXAMETHASONE SODIUM PHOSPHATE 4 MG/ML
2 INJECTION, SOLUTION INTRA-ARTICULAR; INTRALESIONAL; INTRAMUSCULAR; INTRAVENOUS; SOFT TISSUE EVERY 8 HOURS
Status: DISCONTINUED | OUTPATIENT
Start: 2017-10-10 | End: 2017-10-13 | Stop reason: HOSPADM

## 2017-10-10 RX ORDER — METOPROLOL SUCCINATE 50 MG/1
50 TABLET, EXTENDED RELEASE ORAL DAILY
Status: DISCONTINUED | OUTPATIENT
Start: 2017-10-11 | End: 2017-10-13 | Stop reason: HOSPADM

## 2017-10-10 RX ORDER — POLYETHYLENE GLYCOL 3350 17 G/17G
17 POWDER, FOR SOLUTION ORAL DAILY PRN
Status: DISCONTINUED | OUTPATIENT
Start: 2017-10-10 | End: 2017-10-13 | Stop reason: HOSPADM

## 2017-10-10 RX ORDER — ALPRAZOLAM 0.5 MG/1
0.25 TABLET ORAL
Status: DISCONTINUED | OUTPATIENT
Start: 2017-10-10 | End: 2017-10-13 | Stop reason: HOSPADM

## 2017-10-10 RX ORDER — SODIUM CHLORIDE 0.9 % (FLUSH) 0.9 %
10 SYRINGE (ML) INJECTION
Status: COMPLETED | OUTPATIENT
Start: 2017-10-10 | End: 2017-10-10

## 2017-10-10 RX ORDER — TRAMADOL HYDROCHLORIDE 50 MG/1
50 TABLET ORAL
COMMUNITY
End: 2017-10-17

## 2017-10-10 RX ORDER — FAMOTIDINE 20 MG/1
20 TABLET, FILM COATED ORAL 2 TIMES DAILY
Status: DISCONTINUED | OUTPATIENT
Start: 2017-10-10 | End: 2017-10-13 | Stop reason: HOSPADM

## 2017-10-10 RX ORDER — LABETALOL HYDROCHLORIDE 5 MG/ML
10 INJECTION, SOLUTION INTRAVENOUS
Status: DISCONTINUED | OUTPATIENT
Start: 2017-10-10 | End: 2017-10-12

## 2017-10-10 RX ADMIN — DEXAMETHASONE SODIUM PHOSPHATE 2 MG: 4 INJECTION, SOLUTION INTRAMUSCULAR; INTRAVENOUS at 18:02

## 2017-10-10 RX ADMIN — HYDRALAZINE HYDROCHLORIDE 10 MG: 20 INJECTION INTRAMUSCULAR; INTRAVENOUS at 18:57

## 2017-10-10 RX ADMIN — IOPAMIDOL 100 ML: 755 INJECTION, SOLUTION INTRAVENOUS at 17:27

## 2017-10-10 RX ADMIN — SODIUM CHLORIDE 75 ML/HR: 900 INJECTION, SOLUTION INTRAVENOUS at 18:00

## 2017-10-10 RX ADMIN — SODIUM CHLORIDE 100 ML: 900 INJECTION, SOLUTION INTRAVENOUS at 17:27

## 2017-10-10 RX ADMIN — ALPRAZOLAM 0.25 MG: 0.5 TABLET ORAL at 18:16

## 2017-10-10 RX ADMIN — HYDROMORPHONE HYDROCHLORIDE 0.5 MG: 1 INJECTION, SOLUTION INTRAMUSCULAR; INTRAVENOUS; SUBCUTANEOUS at 23:31

## 2017-10-10 RX ADMIN — ONDANSETRON 4 MG: 4 TABLET, ORALLY DISINTEGRATING ORAL at 18:02

## 2017-10-10 RX ADMIN — Medication 10 ML: at 17:27

## 2017-10-10 RX ADMIN — PRAVASTATIN SODIUM 40 MG: 40 TABLET ORAL at 23:31

## 2017-10-10 RX ADMIN — BISACODYL 5 MG: 5 TABLET, DELAYED RELEASE ORAL at 23:31

## 2017-10-10 RX ADMIN — Medication 10 ML: at 21:18

## 2017-10-10 RX ADMIN — HYDROCODONE BITARTRATE AND ACETAMINOPHEN 1 TABLET: 5; 325 TABLET ORAL at 18:02

## 2017-10-10 RX ADMIN — GADOTERIDOL 10 ML: 279.3 INJECTION, SOLUTION INTRAVENOUS at 21:18

## 2017-10-10 RX ADMIN — POLYETHYLENE GLYCOL 3350 17 G: 17 POWDER, FOR SOLUTION ORAL at 23:30

## 2017-10-10 RX ADMIN — FAMOTIDINE 20 MG: 20 TABLET ORAL at 18:02

## 2017-10-10 NOTE — ED NOTES
5:01 PM  This pt was sent here to see Dr. Ron Hernandez. Magui Fair MD has spoken to him and he is taking full control of the pt and does not need us to become involved.     Note written by Ruby Salgado, as dictated by Magui Fair MD 5:02 PM

## 2017-10-10 NOTE — ED TRIAGE NOTES
Pt arrives from MRI with mass to spine with compression, Dr. Jerrod Birch sent to ED for evaluation for Dr. Ada Sherman.

## 2017-10-10 NOTE — IP AVS SNAPSHOT
[address] 
[phone] Patient: Faye Fernandes MRN: [id number] BYO:[birth year] You are allergic to the following No active allergies Immunizations Administered for This Admission Name Date Influenza Vaccine (Quad) PF  Deferred () Recent Documentation Height Weight BMI OB Status Smoking Status 1.6 m 51.7 kg 20.18 kg/m2 Postmenopausal Never Smoker Emergency Contacts Name Discharge Info Relation Home Work Mobile [address] CAREGIVER [3] Daughter [21] [phone] About your hospitalization You were admitted on:  October 10, 2017 You last received care in the:  [facility] 6S NEURO-SCI TELE You were discharged on:  October 13, 2017 Unit phone number:  [phone] Why you were hospitalized Your primary diagnosis was:  Back Pain Your diagnoses also included:  Diabetes (Hcc), Hypertension, Mass Of Spinal Cord (Hcc) Providers Seen During Your Hospitalizations Provider Role Specialty Primary office phone Karma Jerez MD Attending Provider Emergency Medicine [phone] Carson Zabala MD Attending Provider Neurosurgery [phone] Your Primary Care Physician (PCP) Primary Care Physician Office Phone Office Fax Maryeulalio Felicianoba [phone] Follow-up Information Follow up With Details Comments Contact Info Mitch Cummings MD   [facility] [address] [place] [address] 
[phone] Carson Zabala MD  Office will call you Monday with a surgery date for the middle of next week [place] [address] [id number] [phone] Shakira Solis MD Go on 10/16/2017 for lab work [facility] [address] [id number] 
[phone] Current Discharge Medication List  
  
 START taking these medications Dose & Instructions Dispensing Information Comments Morning Noon Evening Bedtime  
 acetaminophen 325 mg tablet Commonly known as:  TYLENOL Your last dose was: Your next dose is:    
   
   
 Dose:  650 mg Take 2 Tabs by mouth every four (4) hours as needed. Indications: Headache Disorder Quantity:  30 Tab Refills:  0  
     
   
   
   
  
 dexamethasone 2 mg tablet Commonly known as:  DECADRON Your last dose was: Your next dose is: Three times a day Quantity:  30 Tab Refills:  0  
     
   
   
   
  
 furosemide 20 mg tablet Commonly known as:  LASIX Your last dose was: Your next dose is: Take 1/2 tab PO daily Quantity:  7 Tab Refills:  0  
     
   
   
   
  
 L. acidoph & paracasei- S therm- Bifido 8 billion cell Cap cap Commonly known as:  AYLIN-Q/RISAQUAD Your last dose was: Your next dose is:    
   
   
 Dose:  1 Cap Take 1 Cap by mouth daily. Quantity:  7 Cap Refills:  0  
     
   
   
   
  
 lactulose 10 gram/15 mL solution Commonly known as:  Malena Trujillo Your last dose was: Your next dose is:    
   
   
 Dose:  30 g Take 45 mL by mouth two (2) times a day for 7 days. Quantity:  630 mL Refills:  0  
     
   
   
   
  
 sodium chloride 1 gram tablet Your last dose was: Your next dose is:    
   
   
 Dose:  2 g Take 2 Tabs by mouth three (3) times daily (with meals). Quantity:  36 Tab Refills:  0 CONTINUE these medications which have NOT CHANGED Dose & Instructions Dispensing Information Comments Morning Noon Evening Bedtime ALPRAZolam 0.25 mg tablet Commonly known as:  Everet Kill Your last dose was: Your next dose is:    
   
   
 Dose:  0.125-0.25 mg Take 0.125-0.25 mg by mouth two (2) times daily as needed for Anxiety (with steroid). Refills:  0 HYDROcodone-acetaminophen 5-325 mg per tablet Commonly known as:  Brianbladimir Thompson Your last dose was: Your next dose is:    
   
   
 Dose:  1 Tab Take 1 Tab by mouth every four (4) hours as needed. Max Daily Amount: 6 Tabs. PRN severe pain Quantity:  10 Tab Refills:  0  
     
   
   
   
  
 levothyroxine 75 mcg tablet Commonly known as:  SYNTHROID Your last dose was: Your next dose is:    
   
   
 Dose:  75 mcg Take 75 mcg by mouth Daily (before breakfast). Refills:  0  
     
   
   
   
  
 metFORMIN  mg tablet Commonly known as:  GLUCOPHAGE XR Your last dose was: Your next dose is:    
   
   
 Dose:  750 mg Take 750 mg by mouth daily (after dinner). Refills:  0  
     
   
   
   
  
 metoprolol succinate 50 mg XL tablet Commonly known as:  TOPROL-XL Your last dose was: Your next dose is:    
   
   
 Dose:  50 mg Take 50 mg by mouth daily. Refills:  0  
     
   
   
   
  
 ondansetron 4 mg disintegrating tablet Commonly known as:  ZOFRAN ODT Your last dose was: Your next dose is:    
   
   
 Dose:  4 mg Take 1 Tab by mouth every six (6) hours as needed. Quantity:  12 Tab Refills:  0  
     
   
   
   
  
 pravastatin 40 mg tablet Commonly known as:  PRAVACHOL Your last dose was: Your next dose is:    
   
   
 Dose:  40 mg Take 40 mg by mouth nightly. Refills:  0  
     
   
   
   
  
 traMADol 50 mg tablet Commonly known as:  ULTRAM  
   
Your last dose was: Your next dose is:    
   
   
 Dose:  50 mg Take 50 mg by mouth every six (6) hours as needed for Pain. Refills:  0 STOP taking these medications   
 ciprofloxacin HCl 500 mg tablet Commonly known as:  CIPRO  
   
  
 methylPREDNISolone 4 mg tablet Commonly known as:  MEDROL (BREANNE) Where to Get Your Medications Information on where to get these meds will be given to you by the nurse or doctor. ! Ask your nurse or doctor about these medications  
  acetaminophen 325 mg tablet  
 dexamethasone 2 mg tablet  
 furosemide 20 mg tablet L. acidoph & paracasei- S therm- Bifido 8 billion cell Cap cap  
 lactulose 10 gram/15 mL solution  
 sodium chloride 1 gram tablet Discharge Instructions Diet - 1000 cc of water per 24 hours. You may drink soups and juice on top of that fluid restriction (but be careful with juices because this will increase your blood sugar!) Activity - as tolerated. No driving while on pain medication. Minimize the pain medication. If you can tolerate just taking Tylenol, that is what we recommend because pain medications are constipating. Continue the lactulose twice a day for bowel movements. If you are having too many bowel movements, then decrease the number of times you are taking the medication. The office will call you with the surgery date and pre-op instructions on Monday. They will tell you what medications to take the morning of surgery. Do not take the lactulose the morning of surgery. Dr. Dexter Irving needs your labs checked Monday morning at his office. Discharge Orders None [employer] Announcement We are excited to announce that we are making your provider's discharge notes available to you in [employer]. You will see these notes when they are completed and signed by the physician that discharged you from your recent hospital stay. If you have any questions or concerns about any information you see in [employer], please call the Health Information Department where you were seen or reach out to your Primary Care Provider for more information about your plan of care. Introducing [facility]!    
 Jo Urbina introduces [employer] patient portal. Now you can access parts of your medical record, email your doctor's office, and request medication refills online. 1. In your internet browser, go to [email or web address]"t 2. Click on the First Time User? Click Here link in the Sign In box. You will see the New Member Sign Up page. 3. Enter your [employer] Access Code exactly as it appears below. You will not need to use this code after youve completed the sign-up process. If you do not sign up before the expiration date, you must request a new code. · [employer] Access Code: [email or web address] Expires: 12/31/2017 11:38 AM 
 
4. Enter the last four digits of your Social Security Number (xxxx) and Date of Birth (mm/dd/yyyy) as indicated and click Submit. You will be taken to the next sign-up page. 5. Create a [employer] ID. This will be your [employer] login ID and cannot be changed, so think of one that is secure and easy to remember. 6. Create a [employer] password. You can change your password at any time. 7. Enter your Password Reset Question and Answer. This can be used at a later time if you forget your password. 8. Enter your e-mail address. You will receive e-mail notification when new information is available in [address] 9. Click Sign Up. You can now view and download portions of your medical record. 10. Click the Download Summary menu link to download a portable copy of your medical information. If you have questions, please visit the Frequently Asked Questions section of the [employer] website. Remember, [employer] is NOT to be used for urgent needs. For medical emergencies, dial 911. Now available from your iPhone and Android! General Information Please provide this summary of care documentation to your next provider. Patient Signature:  ____________________________________________________________ Date:  ____________________________________________________________  
  
Liz Feller Provider Signature:  ____________________________________________________________ Date:  ____________________________________________________________

## 2017-10-10 NOTE — H&P
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 HISTORY OF PRESENT ILLNESS: This is a 76-year-old female who   was recently discharged from [facility]. Her symptoms   started approximately 10 days ago. She was at a wedding and she   developed significant back pain and chills. Over the course of the next   few days, she developed significant pain, tenderness throughout her   back, her legs. At some point, she did have a headache, nausea, and   vomiting. She was seen at the [facility], was discharged home after   they lyndsey blood and urine. The history is obtained from the daughter. They lyndsey blood and urine cultures and samples, called her up and   told her she had a UTI. She continued at home with severe pain,   nausea, vomiting, some headache, but mostly back pain. She did not   improve. On 10/05, she went to the emergency room at [facility] and was admitted for a urinary tract infection. She had   Klebsiella in her urine culture, but never had significant white count,   never had high fever. She did undergo a CT of the spine which was   unremarkable. An Orthopedics PA was consulted who recommended   an outpatient MRI. She was discharged home on antibiotics and   steroids for her back pain. She continues to have severe excruciating   pain. She has not been able to sleep or eat well. She has not had a   bowel movement in about 6 days. She went and saw Dr. Santos Rai yesterday in the office, who referred her for an urgent MRI. This was done on their proprietary machine. The MRI shows an   intradural extramedullary lesion at T12 on the right-hand side dorsal to   the spinal cord.  There is no evidence of significant spinal cord   compression. There also is significant altered signal change within the   cauda equina spinal fluid concerning for significant subarachnoid   hemorrhage. Dr. Funmi Lofton telephoned me and I asked him to send the   patient to the emergency room. She has not had any loss of   consciousness. She has been able to urinate she reports. She is   ambulatory, but feels generally weak. She has been a little bit   intermittently confused that the daughter thinks may be from the pain   medications. PAST MEDICAL HISTORY: Non-insulin-dependent diabetes,   hypercholesterolemia, hypertension, hypothyroidism. ALLERGIES: NO KNOWN DRUG ALLERGIES. MEDICATIONS ON ADMISSION:   1. Ciprofloxacin. 2. Hydrocodone. 3. Over-the-counter Zofran. 4. She has been on intermittent steroids. 5. Metoprolol. 6. Pravachol. 7. Synthroid. 8. Metformin. SOCIAL HISTORY: She has a large family unit. Does not drink or   smoke. FAMILY HISTORY: She no family history of ruptured aneurysms. PHYSICAL EXAMINATION:   NEUROLOGIC: She is a thin, [place] female lying in bed in   obvious pain. She feels better with her legs propped up in the flexed   position. Her motor exam is difficult to evaluate individually due to her   participation, but she appears to have generally good strength. She is   fully ambulatory. She denies significant numbness, but says that she   has burning and pain all the way up to her hypogastric region. No arm   symptoms. She does get some discomfort when she flexes her neck. ABDOMEN: Very full with hypoactive bowel sounds. IMAGING: As noted above. IMPRESSION: The patient has a significant abnormality on her MRI of   her lumbar spine. She has been treated recently for a urinary tract   infection, but I do not think that this is the major problem here. It looks   as though she has had a significant subarachnoid hemorrhage with   blood accumulating in the lumbar spine. This occurred approximately   10 days ago. It is not clear whether this was a primary hemorrhage of   the spinal canal or that this is a hemorrhage from higher up that   layered in the lumbar cistern. It is also not clear whether this intradural   extramedullary mass has any relationship to it. Radiographically, while   contrast was not given on the MRI, it looks most like a meningioma,   which I would not expect a hemorrhage. Certainly, this could be a   metastatic process such as a melanoma or a thyroid tumor that can   hemorrhage. At this point, I think we need to rule out a ruptured   aneurysm, although her history is atypical with this much blood   layering and 10 days ago. I think we should get a CT of the head. If   this is negative, she will be admitted for further workup including an   MRI of the neural axis with and without contrast. We can make further   recommendations based on the findings. MD OPAL Rios / [facility]   D:  10/10/2017   17:34   T:  10/10/2017   18:47   Job #:  [id number]

## 2017-10-10 NOTE — PROGRESS NOTES
Admission Medication Reconciliation:    Information obtained from: Patient's daughter and Rx Query    Significant PMH/Disease States:   Past Medical History:   Diagnosis Date    Diabetes ([address]     type 2    Hypercholesteremia     Hypertension     Hypothyroidism        Chief Complaint for this Admission:    Chief Complaint   Patient presents with    Referral / Consult       Allergies:  Review of patient's allergies indicates no known allergies. Prior to Admission Medications:   Prior to Admission Medications   Prescriptions Last Dose Informant Patient Reported? Taking? ALPRAZolam (XANAX) 0.25 mg tablet 10/9/2017 at pm (0.25 mg)  Yes Yes   Sig: Take 0.125-0.25 mg by mouth two (2) times daily as needed for Anxiety (with steroid). HYDROcodone-acetaminophen (NORCO) 5-325 mg per tablet 10/9/2017 at pm  No Yes   Sig: Take 1 Tab by mouth every four (4) hours as needed. Max Daily Amount: 6 Tabs. PRN severe pain   ciprofloxacin HCl (CIPRO) 500 mg tablet 10/9/2017 at Unknown time  No Yes   Sig: Take 1 Tab by mouth every twelve (12) hours. levothyroxine (SYNTHROID) 75 mcg tablet 10/10/2017 at Unknown time  Yes Yes   Sig: Take 75 mcg by mouth Daily (before breakfast). metFORMIN ER (GLUCOPHAGE XR) 750 mg tablet 10/9/2017 at Unknown time  Yes Yes   Sig: Take 750 mg by mouth daily (after dinner). methylPREDNISolone (MEDROL, BREANNE,) 4 mg tablet 10/9/2017 at 3 tablets 10/7, 2 tablets 10/9  No Yes   Sig: Take 1 Tab by mouth Specific Days and Specific Times. metoprolol succinate (TOPROL-XL) 50 mg XL tablet 10/10/2017 at Unknown time  Yes Yes   Sig: Take 50 mg by mouth daily. ondansetron (ZOFRAN ODT) 4 mg disintegrating tablet 10/9/2017 at Unknown time  No Yes   Sig: Take 1 Tab by mouth every six (6) hours as needed. pravastatin (PRAVACHOL) 40 mg tablet 10/9/2017 at Unknown time  Yes Yes   Sig: Take 40 mg by mouth nightly.    traMADol (ULTRAM) 50 mg tablet 10/9/2017 at Unknown time  Yes Yes   Sig: Take 50 mg by mouth every six (6) hours as needed for Pain. Facility-Administered Medications: None         Comments/Recommendations:     Spoke with patient and patient's daughter regarding patient's allergies and PTA medications. Patient's daughter was a great historian regarding the patient's medication regimen. 1) Confirmed NKDA. 2) Updated PTA medication list. Added alprazolam (accompanies steroid) and tramadol (prescribed with admission ~10 days ago). Modified timing of metformin to daily after dinner. Patient was discharged from [address] [place] [address] on 10/7/17. New prescriptions upon discharge:  - Ciprofloxacin 500 mg BID x 7 day course. Last dose yesterday. - Norco 5-325 mg PRN  - Medrol dose pack - patient took 3 tablets day 1 after discharge and stopped the pack. Per patient's daughter, patient was instructed to take 2 tablets yesterday and 1 today to wean off. Last took 2 tablets yesterday. No dose today. - Zofran ODT 4 mg PRN    Patient had recent prescriptions for cyclobenzaprine 10 mg (half dose taken 10/4), ibuprofen 600 mg, and meloxicam 15 mg, however patient stopped medications due to side effects and has not been taking. Patient reports she was taking glucosamine and a multivitamin, however she has not been taking the supplements since she got sick. Patient's daughter requests that patient receive a stool softener and probiotic if going to continue opioids/antibiotics while inpatient. Last dose information listed in table above.       Indio John, PharmD

## 2017-10-10 NOTE — ED NOTES
Pt transported to MRI and then to unit. Pt A/Ox4, RA, NSR, pain controlled All belongings given to family.

## 2017-10-10 NOTE — IP AVS SNAPSHOT
Summary of Care Report The Summary of Care report has been created to help improve care coordination. Users with access to [employer] or [address] (Web-based application) may access additional patient information including the Discharge Summary. If you are not currently a [address] user and need more information, please call the number listed below in the [place] [address] section and ask to be connected with Medical Records. Facility Information Name Address Phone [address] [id number] [phone] Patient Information Patient Name Sex  Miya Blas ([id number]) Female 1955 Discharge Information Admitting Provider Service Area Unit Preston Patrick MD / [phone] [address] [facility] / [phone] Discharge Provider Discharge Date/Time Discharge Disposition Destination (none) 10/13/2017 Afternoon (Pending) AHR (none) Patient Language Language Portuguese [28] Hospital Problems as of 10/13/2017  Reviewed: 10/10/2017  5:03 PM by Patricia Posadas NP Class Noted - Resolved Last Modified POA Active Problems Hypertension  Unknown - Present 10/10/2017 by Patricia Posadas NP Yes Entered by Miroslava Dai MD  
  Diabetes [facility])  Unknown - Present 10/10/2017 by Patricia Posadas NP Yes Entered by Miroslava Dai MD  
  Overview Signed 10/5/2017  9:40 PM by Miroslava Dai MD  
   type 2 
  
  
  * (Principal)Back pain  10/5/2017 - Present 10/10/2017 by Patricia Posadas NP Yes Entered by Miroslava Dai MD  
  Mass of spinal cord [facility])  10/10/2017 - Present 10/10/2017 by Patricia Posadas NP Unknown Entered by Patricia Posadas NP Non-Hospital Problems as of 10/13/2017  Reviewed: 10/10/2017  5:03 PM by Patricia Posadas NP  
  
  
  
 Class Noted - Resolved Last Modified Active Problems UTI (urinary tract infection)  10/5/2017 - Present 10/5/2017 by Jose Rodgers MD  
  Entered by Jose Rodgers MD  
  Hypothyroidism  Unknown - Present 10/5/2017 by Jose Rodgers MD  
  Entered by Jose Rodgers MD  
  Hypercholesteremia  Unknown - Present 10/5/2017 by Jose Rodgers MD  
  Entered by Jose Rodgers MD  
  Vomiting  10/5/2017 - Present 10/5/2017 by Jose Rodgers MD  
  Entered by Jose Rodgers MD  
  
You are allergic to the following No active allergies Current Discharge Medication List  
  
START taking these medications Dose & Instructions Dispensing Information Comments  
 acetaminophen 325 mg tablet Commonly known as:  TYLENOL Dose:  650 mg Take 2 Tabs by mouth every four (4) hours as needed. Indications: Headache Disorder Quantity:  30 Tab Refills:  0  
   
 dexamethasone 2 mg tablet Commonly known as:  DECADRON Three times a day Quantity:  30 Tab Refills:  0  
   
 furosemide 20 mg tablet Commonly known as:  LASIX Take 1/2 tab PO daily Quantity:  7 Tab Refills:  0  
   
 L. acidoph & paracasei- S therm- Bifido 8 billion cell Cap cap Commonly known as:  AYLIN-Q/RISAQUAD Dose:  1 Cap Take 1 Cap by mouth daily. Quantity:  7 Cap Refills:  0  
   
 lactulose 10 gram/15 mL solution Commonly known as:  Bobby Guess Dose:  30 g Take 45 mL by mouth two (2) times a day for 7 days. Quantity:  630 mL Refills:  0  
   
 sodium chloride 1 gram tablet Dose:  2 g Take 2 Tabs by mouth three (3) times daily (with meals). Quantity:  36 Tab Refills:  0 CONTINUE these medications which have NOT CHANGED Dose & Instructions Dispensing Information Comments ALPRAZolam 0.25 mg tablet Commonly known as:  Ace Sa Dose:  0.125-0.25 mg Take 0.125-0.25 mg by mouth two (2) times daily as needed for Anxiety (with steroid). Refills:  0 HYDROcodone-acetaminophen 5-325 mg per tablet Commonly known as:  Tanvi Barrs Dose:  1 Tab Take 1 Tab by mouth every four (4) hours as needed. Max Daily Amount: 6 Tabs. PRN severe pain Quantity:  10 Tab Refills:  0  
   
 levothyroxine 75 mcg tablet Commonly known as:  SYNTHROID Dose:  75 mcg Take 75 mcg by mouth Daily (before breakfast). Refills:  0  
   
 metFORMIN  mg tablet Commonly known as:  GLUCOPHAGE XR Dose:  750 mg Take 750 mg by mouth daily (after dinner). Refills:  0  
   
 metoprolol succinate 50 mg XL tablet Commonly known as:  TOPROL-XL Dose:  50 mg Take 50 mg by mouth daily. Refills:  0  
   
 ondansetron 4 mg disintegrating tablet Commonly known as:  ZOFRAN ODT Dose:  4 mg Take 1 Tab by mouth every six (6) hours as needed. Quantity:  12 Tab Refills:  0  
   
 pravastatin 40 mg tablet Commonly known as:  PRAVACHOL Dose:  40 mg Take 40 mg by mouth nightly. Refills:  0  
   
 traMADol 50 mg tablet Commonly known as:  ULTRAM  
 Dose:  50 mg Take 50 mg by mouth every six (6) hours as needed for Pain. Refills:  0 STOP taking these medications Comments  
 ciprofloxacin HCl 500 mg tablet Commonly known as:  CIPRO  
   
   
 methylPREDNISolone 4 mg tablet Commonly known as:  MEDROL (BREANNE) Current Immunizations Name Date Influenza Vaccine (Quad) PF  Deferred () Follow-up Information Follow up With Details Comments Contact Info MD Shahram Graciams [facility] [address] [place] [address] 
[phone] Parish Woodruff MD  Office will call you Monday with a surgery date for the middle of next week [facility] [address] [place] [address] [phone] Link MD Rashida Go on 10/16/2017 [facility] lab work [facility] [address] 
[phone] Discharge Instructions Diet - 1000 cc of water per 24 hours. You may drink soups and juice on top of that fluid restriction (but be careful with juices because this will increase your blood sugar!) Activity - as tolerated. No driving while on pain medication. Minimize the pain medication. If you can tolerate just taking Tylenol, that is what we recommend because pain medications are constipating. Continue the lactulose twice a day for bowel movements. If you are having too many bowel movements, then decrease the number of times you are taking the medication. The office will call you with the surgery date and pre-op instructions on Monday. They will tell you what medications to take the morning of surgery. Do not take the lactulose the morning of surgery. Dr. Rachelle Harmon needs your labs checked Monday morning at his office. Chart Review Routing History Recipient Method Report Sent By Kimberly Velazquez MD  
Fax: [phone] Phone: [phone] Fax Nancy Flower MD NOTES AUTO ROUTING REPORT Dora Pringle MD [[id number]] 10/5/2017  9:49 PM 10/05/2017 Prince Marcus MD  
Fax: [phone] Phone: [phone] Fax Nancy Flower MD NOTES AUTO ROUTING REPORT Dora Pringle MD [[id number]] 10/5/2017 10:29 PM 10/05/2017

## 2017-10-10 NOTE — IP AVS SNAPSHOT
[address] [facility] [address] [place] [address] 
[phone] Patient: Julia Melo MRN: [id number] NLL:1/51/2098 Current Discharge Medication List  
  
START taking these medications Dose & Instructions Dispensing Information Comments Morning Noon Evening Bedtime  
 acetaminophen 325 mg tablet Commonly known as:  TYLENOL Your last dose was: Your next dose is:    
   
   
 Dose:  650 mg Take 2 Tabs by mouth every four (4) hours as needed. Indications: Headache Disorder Quantity:  30 Tab Refills:  0  
     
   
   
   
  
 dexamethasone 2 mg tablet Commonly known as:  DECADRON Your last dose was: Your next dose is: Three times a day Quantity:  30 Tab Refills:  0  
     
   
   
   
  
 furosemide 20 mg tablet Commonly known as:  LASIX Your last dose was: Your next dose is: Take 1/2 tab PO daily Quantity:  7 Tab Refills:  0  
     
   
   
   
  
 L. acidoph & paracasei- S therm- Bifido 8 billion cell Cap cap Commonly known as:  AYLIN-Q/RISAQUAD Your last dose was: Your next dose is:    
   
   
 Dose:  1 Cap Take 1 Cap by mouth daily. Quantity:  7 Cap Refills:  0  
     
   
   
   
  
 lactulose 10 gram/15 mL solution Commonly known as:  Susy Yung Your last dose was: Your next dose is:    
   
   
 Dose:  30 g Take 45 mL by mouth two (2) times a day for 7 days. Quantity:  630 mL Refills:  0  
     
   
   
   
  
 sodium chloride 1 gram tablet Your last dose was: Your next dose is:    
   
   
 Dose:  2 g Take 2 Tabs by mouth three (3) times daily (with meals). Quantity:  36 Tab Refills:  0 CONTINUE these medications which have NOT CHANGED Dose & Instructions Dispensing Information Comments Morning Noon Evening Bedtime ALPRAZolam 0.25 mg tablet Commonly known as:  Donita Pacheco Your last dose was: Your next dose is:    
   
   
 Dose:  0.125-0.25 mg Take 0.125-0.25 mg by mouth two (2) times daily as needed for Anxiety (with steroid). Refills:  0 HYDROcodone-acetaminophen 5-325 mg per tablet Commonly known as:  Miranda Juliana Your last dose was: Your next dose is:    
   
   
 Dose:  1 Tab Take 1 Tab by mouth every four (4) hours as needed. Max Daily Amount: 6 Tabs. PRN severe pain Quantity:  10 Tab Refills:  0  
     
   
   
   
  
 levothyroxine 75 mcg tablet Commonly known as:  SYNTHROID Your last dose was: Your next dose is:    
   
   
 Dose:  75 mcg Take 75 mcg by mouth Daily (before breakfast). Refills:  0  
     
   
   
   
  
 metFORMIN  mg tablet Commonly known as:  GLUCOPHAGE XR Your last dose was: Your next dose is:    
   
   
 Dose:  750 mg Take 750 mg by mouth daily (after dinner). Refills:  0  
     
   
   
   
  
 metoprolol succinate 50 mg XL tablet Commonly known as:  TOPROL-XL Your last dose was: Your next dose is:    
   
   
 Dose:  50 mg Take 50 mg by mouth daily. Refills:  0  
     
   
   
   
  
 ondansetron 4 mg disintegrating tablet Commonly known as:  ZOFRAN ODT Your last dose was: Your next dose is:    
   
   
 Dose:  4 mg Take 1 Tab by mouth every six (6) hours as needed. Quantity:  12 Tab Refills:  0  
     
   
   
   
  
 pravastatin 40 mg tablet Commonly known as:  PRAVACHOL Your last dose was: Your next dose is:    
   
   
 Dose:  40 mg Take 40 mg by mouth nightly. Refills:  0  
     
   
   
   
  
 traMADol 50 mg tablet Commonly known as:  ULTRAM  
   
Your last dose was: Your next dose is:    
   
   
 Dose:  50 mg Take 50 mg by mouth every six (6) hours as needed for Pain. Refills:  0 STOP taking these medications   
 ciprofloxacin HCl 500 mg tablet Commonly known as:  CIPRO  
   
  
 methylPREDNISolone 4 mg tablet Commonly known as:  MEDROL (BREANNE) Where to Get Your Medications Information on where to get these meds will be given to you by the nurse or doctor. ! Ask your nurse or doctor about these medications  
  acetaminophen 325 mg tablet  
 dexamethasone 2 mg tablet  
 furosemide 20 mg tablet L. acidoph & paracasei- S therm- Bifido 8 billion cell Cap cap  
 lactulose 10 gram/15 mL solution  
 sodium chloride 1 gram tablet

## 2017-10-10 NOTE — ED NOTES
TRANSFER - OUT REPORT:    Verbal report given to Shannon FREED(name) on Mehreen Cap  being transferred to 6S(unit) for routine progression of care       Report consisted of patients Situation, Background, Assessment and   Recommendations(SBAR). Information from the following report(s) SBAR, ED Summary, Intake/Output, MAR, Recent Results and Cardiac Rhythm SR was reviewed with the receiving nurse. Lines:       Opportunity for questions and clarification was provided.       Patient transported with:   Monitor   Paramedic

## 2017-10-11 ENCOUNTER — APPOINTMENT (OUTPATIENT)
Dept: CT IMAGING | Age: 62
DRG: 092 | End: 2017-10-11
Attending: NEUROLOGICAL SURGERY
Payer: COMMERCIAL

## 2017-10-11 LAB
ALBUMIN SERPL-MCNC: 3.4 G/DL (ref 3.5–5)
ALBUMIN/GLOB SERPL: 1 {RATIO} (ref 1.1–2.2)
ALP SERPL-CCNC: 70 U/L (ref 45–117)
ALT SERPL-CCNC: 19 U/L (ref 12–78)
ANION GAP SERPL CALC-SCNC: 11 MMOL/L (ref 5–15)
ANION GAP SERPL CALC-SCNC: 9 MMOL/L (ref 5–15)
AST SERPL-CCNC: 12 U/L (ref 15–37)
BILIRUB SERPL-MCNC: 0.6 MG/DL (ref 0.2–1)
BUN SERPL-MCNC: 11 MG/DL (ref 6–20)
BUN SERPL-MCNC: 11 MG/DL (ref 6–20)
BUN/CREAT SERPL: 16 (ref 12–20)
BUN/CREAT SERPL: 20 (ref 12–20)
CALCIUM SERPL-MCNC: 8 MG/DL (ref 8.5–10.1)
CALCIUM SERPL-MCNC: 8.1 MG/DL (ref 8.5–10.1)
CHLORIDE SERPL-SCNC: 92 MMOL/L (ref 97–108)
CHLORIDE SERPL-SCNC: 95 MMOL/L (ref 97–108)
CO2 SERPL-SCNC: 21 MMOL/L (ref 21–32)
CO2 SERPL-SCNC: 26 MMOL/L (ref 21–32)
CREAT SERPL-MCNC: 0.54 MG/DL (ref 0.55–1.02)
CREAT SERPL-MCNC: 0.7 MG/DL (ref 0.55–1.02)
GLOBULIN SER CALC-MCNC: 3.5 G/DL (ref 2–4)
GLUCOSE BLD STRIP.AUTO-MCNC: 115 MG/DL (ref 65–100)
GLUCOSE BLD STRIP.AUTO-MCNC: 140 MG/DL (ref 65–100)
GLUCOSE BLD STRIP.AUTO-MCNC: 145 MG/DL (ref 65–100)
GLUCOSE BLD STRIP.AUTO-MCNC: 147 MG/DL (ref 65–100)
GLUCOSE BLD STRIP.AUTO-MCNC: 156 MG/DL (ref 65–100)
GLUCOSE SERPL-MCNC: 135 MG/DL (ref 65–100)
GLUCOSE SERPL-MCNC: 184 MG/DL (ref 65–100)
INR PPP: 1.1 (ref 0.9–1.1)
MAGNESIUM SERPL-MCNC: 2.6 MG/DL (ref 1.6–2.4)
OSMOLALITY UR: 343 MOSM/KG H2O
PHOSPHATE SERPL-MCNC: 3.3 MG/DL (ref 2.6–4.7)
POTASSIUM SERPL-SCNC: 4.6 MMOL/L (ref 3.5–5.1)
POTASSIUM SERPL-SCNC: ABNORMAL MMOL/L (ref 3.5–5.1)
PROT SERPL-MCNC: 6.9 G/DL (ref 6.4–8.2)
PROTHROMBIN TIME: 10.8 SEC (ref 9–11.1)
SERVICE CMNT-IMP: ABNORMAL
SODIUM SERPL-SCNC: 124 MMOL/L (ref 136–145)
SODIUM SERPL-SCNC: 130 MMOL/L (ref 136–145)
SODIUM UR-SCNC: 72 MMOL/L
URATE SERPL-MCNC: 3.6 MG/DL (ref 2.6–6)
URATE UR-MCNC: 19.2 MG/DL

## 2017-10-11 PROCEDURE — 36415 COLL VENOUS BLD VENIPUNCTURE: CPT | Performed by: NURSE PRACTITIONER

## 2017-10-11 PROCEDURE — 84560 ASSAY OF URINE/URIC ACID: CPT | Performed by: NEUROLOGICAL SURGERY

## 2017-10-11 PROCEDURE — 84300 ASSAY OF URINE SODIUM: CPT | Performed by: NURSE PRACTITIONER

## 2017-10-11 PROCEDURE — 82962 GLUCOSE BLOOD TEST: CPT

## 2017-10-11 PROCEDURE — 84100 ASSAY OF PHOSPHORUS: CPT | Performed by: NURSE PRACTITIONER

## 2017-10-11 PROCEDURE — 74011250637 HC RX REV CODE- 250/637: Performed by: INTERNAL MEDICINE

## 2017-10-11 PROCEDURE — 74011250637 HC RX REV CODE- 250/637: Performed by: NURSE PRACTITIONER

## 2017-10-11 PROCEDURE — 74177 CT ABD & PELVIS W/CONTRAST: CPT

## 2017-10-11 PROCEDURE — 96361 HYDRATE IV INFUSION ADD-ON: CPT

## 2017-10-11 PROCEDURE — 74011000250 HC RX REV CODE- 250: Performed by: NURSE PRACTITIONER

## 2017-10-11 PROCEDURE — 74011250636 HC RX REV CODE- 250/636: Performed by: NURSE PRACTITIONER

## 2017-10-11 PROCEDURE — 74011636320 HC RX REV CODE- 636/320: Performed by: NEUROLOGICAL SURGERY

## 2017-10-11 PROCEDURE — 83935 ASSAY OF URINE OSMOLALITY: CPT | Performed by: NURSE PRACTITIONER

## 2017-10-11 PROCEDURE — 71260 CT THORAX DX C+: CPT

## 2017-10-11 PROCEDURE — 74011000258 HC RX REV CODE- 258: Performed by: NEUROLOGICAL SURGERY

## 2017-10-11 PROCEDURE — 84550 ASSAY OF BLOOD/URIC ACID: CPT | Performed by: INTERNAL MEDICINE

## 2017-10-11 PROCEDURE — 83735 ASSAY OF MAGNESIUM: CPT | Performed by: NURSE PRACTITIONER

## 2017-10-11 PROCEDURE — 77030037404 HC CATH FOL COUDE SURESTEP BARD -B

## 2017-10-11 PROCEDURE — 74011636637 HC RX REV CODE- 636/637: Performed by: NURSE PRACTITIONER

## 2017-10-11 PROCEDURE — 85610 PROTHROMBIN TIME: CPT | Performed by: NURSE PRACTITIONER

## 2017-10-11 PROCEDURE — 65660000000 HC RM CCU STEPDOWN

## 2017-10-11 PROCEDURE — 51798 US URINE CAPACITY MEASURE: CPT

## 2017-10-11 PROCEDURE — 80053 COMPREHEN METABOLIC PANEL: CPT | Performed by: NURSE PRACTITIONER

## 2017-10-11 PROCEDURE — 80048 BASIC METABOLIC PNL TOTAL CA: CPT | Performed by: INTERNAL MEDICINE

## 2017-10-11 PROCEDURE — 96376 TX/PRO/DX INJ SAME DRUG ADON: CPT

## 2017-10-11 PROCEDURE — 99218 HC RM OBSERVATION: CPT

## 2017-10-11 RX ORDER — TOLVAPTAN 15 MG/1
15 TABLET ORAL ONCE
Status: COMPLETED | OUTPATIENT
Start: 2017-10-11 | End: 2017-10-11

## 2017-10-11 RX ORDER — DEXTROSE 50 % IN WATER (D50W) INTRAVENOUS SYRINGE
12.5-25 AS NEEDED
Status: DISCONTINUED | OUTPATIENT
Start: 2017-10-11 | End: 2017-10-12 | Stop reason: SDUPTHER

## 2017-10-11 RX ORDER — MAGNESIUM CITRATE
296 SOLUTION, ORAL ORAL
Status: DISCONTINUED | OUTPATIENT
Start: 2017-10-11 | End: 2017-10-13 | Stop reason: HOSPADM

## 2017-10-11 RX ORDER — INSULIN LISPRO 100 [IU]/ML
INJECTION, SOLUTION INTRAVENOUS; SUBCUTANEOUS
Status: DISCONTINUED | OUTPATIENT
Start: 2017-10-11 | End: 2017-10-11 | Stop reason: SDUPTHER

## 2017-10-11 RX ORDER — SODIUM CHLORIDE 0.9 % (FLUSH) 0.9 %
10 SYRINGE (ML) INJECTION
Status: COMPLETED | OUTPATIENT
Start: 2017-10-11 | End: 2017-10-11

## 2017-10-11 RX ORDER — MAGNESIUM SULFATE 100 %
4 CRYSTALS MISCELLANEOUS AS NEEDED
Status: DISCONTINUED | OUTPATIENT
Start: 2017-10-11 | End: 2017-10-12 | Stop reason: SDUPTHER

## 2017-10-11 RX ORDER — INSULIN LISPRO 100 [IU]/ML
INJECTION, SOLUTION INTRAVENOUS; SUBCUTANEOUS
Status: DISCONTINUED | OUTPATIENT
Start: 2017-10-11 | End: 2017-10-12

## 2017-10-11 RX ADMIN — INSULIN LISPRO 2 UNITS: 100 INJECTION, SOLUTION INTRAVENOUS; SUBCUTANEOUS at 07:23

## 2017-10-11 RX ADMIN — FAMOTIDINE 20 MG: 20 TABLET ORAL at 09:34

## 2017-10-11 RX ADMIN — ALPRAZOLAM 0.25 MG: 0.5 TABLET ORAL at 20:17

## 2017-10-11 RX ADMIN — POLYETHYLENE GLYCOL-3350 AND ELECTROLYTES 4000 ML: 236; 6.74; 5.86; 2.97; 22.74 POWDER, FOR SOLUTION ORAL at 18:57

## 2017-10-11 RX ADMIN — DEXAMETHASONE SODIUM PHOSPHATE 2 MG: 4 INJECTION, SOLUTION INTRAMUSCULAR; INTRAVENOUS at 18:56

## 2017-10-11 RX ADMIN — METOPROLOL SUCCINATE 50 MG: 50 TABLET, EXTENDED RELEASE ORAL at 09:33

## 2017-10-11 RX ADMIN — SODIUM CHLORIDE 100 ML: 900 INJECTION, SOLUTION INTRAVENOUS at 12:03

## 2017-10-11 RX ADMIN — Medication 10 ML: at 12:03

## 2017-10-11 RX ADMIN — IOPAMIDOL 100 ML: 755 INJECTION, SOLUTION INTRAVENOUS at 12:03

## 2017-10-11 RX ADMIN — HYDROMORPHONE HYDROCHLORIDE 0.5 MG: 1 INJECTION, SOLUTION INTRAMUSCULAR; INTRAVENOUS; SUBCUTANEOUS at 03:09

## 2017-10-11 RX ADMIN — HYDROMORPHONE HYDROCHLORIDE 0.5 MG: 1 INJECTION, SOLUTION INTRAMUSCULAR; INTRAVENOUS; SUBCUTANEOUS at 11:00

## 2017-10-11 RX ADMIN — TOLVAPTAN 15 MG: 15 TABLET ORAL at 20:57

## 2017-10-11 RX ADMIN — PRAVASTATIN SODIUM 40 MG: 40 TABLET ORAL at 20:57

## 2017-10-11 RX ADMIN — INSULIN LISPRO 2 UNITS: 100 INJECTION, SOLUTION INTRAVENOUS; SUBCUTANEOUS at 01:11

## 2017-10-11 RX ADMIN — DEXAMETHASONE SODIUM PHOSPHATE 2 MG: 4 INJECTION, SOLUTION INTRAMUSCULAR; INTRAVENOUS at 01:12

## 2017-10-11 RX ADMIN — ONDANSETRON 4 MG: 4 TABLET, ORALLY DISINTEGRATING ORAL at 20:17

## 2017-10-11 RX ADMIN — SODIUM PHOSPHATE, DIBASIC AND SODIUM PHOSPHATE, MONOBASIC 118 ML: 7; 19 ENEMA RECTAL at 12:33

## 2017-10-11 RX ADMIN — HYDROMORPHONE HYDROCHLORIDE 0.5 MG: 1 INJECTION, SOLUTION INTRAMUSCULAR; INTRAVENOUS; SUBCUTANEOUS at 20:18

## 2017-10-11 RX ADMIN — LEVOTHYROXINE SODIUM 75 MCG: 150 TABLET ORAL at 07:15

## 2017-10-11 RX ADMIN — FAMOTIDINE 20 MG: 20 TABLET ORAL at 18:56

## 2017-10-11 RX ADMIN — BISACODYL 5 MG: 5 TABLET, DELAYED RELEASE ORAL at 09:33

## 2017-10-11 RX ADMIN — IOHEXOL 50 ML: 240 INJECTION, SOLUTION INTRATHECAL; INTRAVASCULAR; INTRAVENOUS; ORAL at 10:00

## 2017-10-11 RX ADMIN — SODIUM CHLORIDE 75 ML/HR: 900 INJECTION, SOLUTION INTRAVENOUS at 09:56

## 2017-10-11 RX ADMIN — POLYETHYLENE GLYCOL 3350 17 G: 17 POWDER, FOR SOLUTION ORAL at 09:33

## 2017-10-11 RX ADMIN — ALPRAZOLAM 0.25 MG: 0.5 TABLET ORAL at 03:09

## 2017-10-11 RX ADMIN — DEXAMETHASONE SODIUM PHOSPHATE 2 MG: 4 INJECTION, SOLUTION INTRAMUSCULAR; INTRAVENOUS at 09:34

## 2017-10-11 NOTE — PROGRESS NOTES
Films reviewed and pt and family counseled extensively. No evidence of additional cns pathology. No clear primary tumor, but massive stool build up only in right colon. Gi consulted and will see. Will need to cstabilize gi and electrolyte situation pre-op. Plan for spinal surgery next week for tumor ressection.   Consider pre-op spinal angio coordinate day of surgery

## 2017-10-11 NOTE — CONSULTS
[facility] [address]   [facility]       Name:  Pardeep Mcdonnell   MR#:  [id number]   :  1955   Account #:  [de-identified]    Date of Consultation:  10/11/2017   Date of Adm:  10/10/2017       REASON FOR CONSULTATION: Diabetic management. PHYSICIAN REQUESTING CONSULTATION: Dr. Dejuan Gupta,   neurosurgery. HISTORY OF PRESENT ILLNESS: The patient who is a 76-year-old   was admitted by Dr. Dejuan Gupta for a neurosurgical procedure. Her past   medical history is important for hypertension, diabetes, high   cholesterol, as well as back pain that developed back on October the   . The patient started on 2017 with lower back pain that was   exacerbated by a urinary tract infection that grew Klebsiella   pneumoniae, pansensitive, and it was treated with Cipro 500 mg b.i.d. Due to the lack of improvement from the back pain she saw a spine   surgeon that repeat an MRI and sent the patient for further medical   treatment. ALLERGIES: NO KNOWN ALLERGIES. PAST MEDICAL HISTORY: Hypertension, diabetes, hyperlipidemia,   hypothyroidism; as well as a recent UTI with Klebsiella pneumoniae,   pansensitive. MEDICATIONS   She is taking at this time is:   1. Xanax 0.25 mg as needed. 2. Norco 5/325 as needed for pain. 3. Cipro 500 mg p.o. b.i.d.   4. Synthroid 75 mcg p.o. daily. 5. Glucophage- mg daily. 6. Medrol Dosepak 4 mg daily. 7. Toprol-XL 50 mg daily. 8. Zofran as needed. 9. Pravachol 40 mg p.o. daily. 10. Tramadol 50 mg b.i.d. as needed for pain. SURGICAL HISTORY: Negative prior to this event. TOXIC HABITS: Negative. FAMILY HISTORY: Positive for stroke in the father's side, diabetes as   well as hyperlipidemia. SOCIAL HISTORY: , still working. 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     PHYSICAL EXAMINATION   VITAL SIGNS: Blood pressure is 157/79, heart rate is 65, respiration is   15, temperature is 98, and oxygen saturation is 98% on room air. GENERAL APPEARANCE: This is a 77-year-old, in no acute distress,   complaining of some pain post-procedure at this time, but in no acute   cardiopulmonary distress. HEENT: There is no deformity, normal color conjunctivae. Moist oral   mucosa. NECK: Supple, no JVD, no bruits. Thorax symmetrical expansion   without deformity. CARDIOVASCULAR: Regular rhythm. S1, S2 present. LUNGS: Clear to auscultation. ABDOMEN: Soft, bowel sounds present. There is no organomegaly. EXTREMITIES: Limbs symmetric. Pulse present. No edema. NEUROLOGIC: The patient is conscious, alert, able to interact, follow   command. LABORATORY DATA: WBC count is 7.3, hemoglobin and hematocrit   is 12.9 and 39, platelet count is 767. Sodium is 130, potassium 4.6,   chloride is 95, CO2 is 26, BUN is 11, creatinine 0.5, and the glucose is   185. A1c of 6.5. ASSESSMENT   1. Postoperative day #1.   2. Back pain. 3. Diabetes. 4. Hypertension. 5. Hyperlipidemia. 6. Hypothyroidism. PLAN   1. Postoperative day #1. Management will continue under   Neurosurgery. 2. The diabetes. I will assume that the concern is now that the patient   was started on Decadron it will increase  blood glucose. As such, the best   management at this time will be sliding scale for the patient, and in 48-  hours we should start her Glucophage that needed to be on hold due   to the contrast use in the cardiological studies done. I believe that this   combination of a sliding scale with the Glucophage will have the blood   sugar under control. 3. Hypertension. At this point is mildly elevated with systolic 824, likely   that this is post-procedure. A p.r.n. medication in this patient will be   useful to further control this patient. 4. Hyperlipidemia. At this time, the goal of the hyperlipidemia   management is a necessity at this time.    5. Hypothyroidism. We will continue her Synthroid 75 mcg p.o. daily. 6. Electrolyte imbalance manifested with sodium of 130 and chloride of   95, likely this is a combined phenomena from dehydration, for which   normal saline will be the best solution in which this patient can be   managed . Thank you for allowing me to participate in the treatment of the patient.         MD MULUGETA Demarco / DOUGLAS   D:  10/11/2017   16:08   T:  10/11/2017   16:56   Job #:  [id number]

## 2017-10-11 NOTE — CONSULTS
[address] [place] [address] Naun NOTE  [facility]  [phone] NP in-hospital cell phone M-F until 4:30  After 5pm or on weekends, please call  for physician on call        NAME:  Miller Cutler   :   1955   MRN:   [id number]       Referring Physician: Maci Reina NP    Consult Date: 10/11/2017 4:33 PM    Chief Complaint: dilated colon on right      History of Present Illness:  Patient is a 57 y.o. who is seen in consultation at the request of Maci Reina NP for dilated colon on right. Patient was originally admitted 10/10/17 for an abnormal MRI and the plan is for surgery next week for tumor ressection. History from patient and daughter. She was recently treated for Klebsiella UTI. She has recent opiate use for back pain since early October. Patient reports her last bowel movement was during the night of 17, she believes it was a large volume but is unsure if she had diarrhea, melena, or hematochezia. She reports normal bowel patterns without melena or hematochezia prior to that time. She has had a few episodes of nausea with vomiting on  and 10/5. She reports chronic eructation. Prior to admission patient was taking prune juice, a stool softener, and had half a bottle of milk of magnesia. Since admission, she has been on daily miralax, oral bisacodyl, and received one fleets enema. Lactulose was started this evening   Patient has seen Dr. Aide Silva and had a colonoscopy about a year ago, reportedly normal. Patient was scheduled to see Dr. Aide Silva today for constipation. I have reviewed the emergency room note, hospital admission note, notes by all other clinicians who have seen the patient during this hospitalization to date. I have reviewed the problem list and the reason for this hospitalization.  I have reviewed the allergies and the medications the patient was taking at home prior to this hospitalization. PMH:  Past Medical History:   Diagnosis Date    Diabetes ([address]     type 2    Hypercholesteremia     Hypertension     Hypothyroidism        PSH:  History reviewed. No pertinent surgical history. Allergies:  No Known Allergies    Home Medications:  Prior to Admission Medications   Prescriptions Last Dose Informant Patient Reported? Taking? ALPRAZolam (XANAX) 0.25 mg tablet 10/9/2017 at pm (0.25 mg)  Yes Yes   Sig: Take 0.125-0.25 mg by mouth two (2) times daily as needed for Anxiety (with steroid). HYDROcodone-acetaminophen (NORCO) 5-325 mg per tablet 10/9/2017 at pm  No Yes   Sig: Take 1 Tab by mouth every four (4) hours as needed. Max Daily Amount: 6 Tabs. PRN severe pain   ciprofloxacin HCl (CIPRO) 500 mg tablet 10/9/2017 at Unknown time  No Yes   Sig: Take 1 Tab by mouth every twelve (12) hours. levothyroxine (SYNTHROID) 75 mcg tablet 10/10/2017 at Unknown time  Yes Yes   Sig: Take 75 mcg by mouth Daily (before breakfast). metFORMIN ER (GLUCOPHAGE XR) 750 mg tablet 10/9/2017 at Unknown time  Yes Yes   Sig: Take 750 mg by mouth daily (after dinner). methylPREDNISolone (MEDROL, BREANNE,) 4 mg tablet 10/9/2017 at 3 tablets 10/7, 2 tablets 10/9  No Yes   Sig: Take 1 Tab by mouth Specific Days and Specific Times. metoprolol succinate (TOPROL-XL) 50 mg XL tablet 10/10/2017 at Unknown time  Yes Yes   Sig: Take 50 mg by mouth daily. ondansetron (ZOFRAN ODT) 4 mg disintegrating tablet 10/9/2017 at Unknown time  No Yes   Sig: Take 1 Tab by mouth every six (6) hours as needed. pravastatin (PRAVACHOL) 40 mg tablet 10/9/2017 at Unknown time  Yes Yes   Sig: Take 40 mg by mouth nightly. traMADol (ULTRAM) 50 mg tablet 10/9/2017 at Unknown time  Yes Yes   Sig: Take 50 mg by mouth every six (6) hours as needed for Pain.       Facility-Administered Medications: None       Hospital Medications:  Current Facility-Administered Medications   Medication Dose Route Frequency    [START ON 10/12/2017] influenza vaccine 2017-18 (3 yrs+)(PF) (FLUZONE QUAD/FLUARIX QUAD) injection 0.5 mL  0.5 mL IntraMUSCular PRIOR TO DISCHARGE    magnesium citrate solution 296 mL  296 mL Oral ONCE PRN    lactulose (CHRONULAC) solution 20 g  20 g Oral TID    [START ON 10/12/2017] lactobac ac& pc-s.therm-b.anim (AYLIN Q/RISAQUAD)  1 Cap Oral DAILY    glucose chewable tablet 16 g  4 Tab Oral PRN    dextrose (D50W) injection syrg 12.5-25 g  12.5-25 g IntraVENous PRN    glucagon (GLUCAGEN) injection 1 mg  1 mg IntraMUSCular PRN    glucose chewable tablet 16 g  4 Tab Oral PRN    dextrose (D50W) injection syrg 12.5-25 g  12.5-25 g IntraVENous PRN    glucagon (GLUCAGEN) injection 1 mg  1 mg IntraMUSCular PRN    insulin lispro (HUMALOG) injection   SubCUTAneous AC&HS    0.9% sodium chloride infusion  75 mL/hr IntraVENous CONTINUOUS    glucose chewable tablet 16 g  4 Tab Oral PRN    dextrose (D50W) injection syrg 12.5-25 g  12.5-25 g IntraVENous PRN    glucagon (GLUCAGEN) injection 1 mg  1 mg IntraMUSCular PRN    dexamethasone (DECADRON) 4 mg/mL injection 2 mg  2 mg IntraVENous Q8H    famotidine (PEPCID) tablet 20 mg  20 mg Oral BID    hydrALAZINE (APRESOLINE) 20 mg/mL injection 10 mg  10 mg IntraVENous Q15MIN PRN    Or    labetalol (NORMODYNE;TRANDATE) injection 10 mg  10 mg IntraVENous Q15MIN PRN    ondansetron (ZOFRAN ODT) tablet 4 mg  4 mg Oral Q6H PRN    levothyroxine (SYNTHROID) tablet 75 mcg  75 mcg Oral ACB    metoprolol succinate (TOPROL-XL) XL tablet 50 mg  50 mg Oral DAILY    pravastatin (PRAVACHOL) tablet 40 mg  40 mg Oral QHS    bisacodyl (DULCOLAX) tablet 5 mg  5 mg Oral DAILY PRN    HYDROmorphone (PF) (DILAUDID) injection 0.5 mg  0.5 mg IntraVENous Q3H PRN    ALPRAZolam (XANAX) tablet 0.25 mg  0.25 mg Oral TID PRN    acetaminophen (TYLENOL) tablet 650 mg  650 mg Oral Q4H PRN    HYDROcodone-acetaminophen (NORCO) 5-325 mg per tablet 1 Tab  1 Tab Oral Q4H PRN    polyethylene glycol (MIRALAX) packet 17 g  17 g Oral DAILY PRN       Social History:  Social History   Substance Use Topics    Smoking status: Never Smoker    Smokeless tobacco: Never Used    Alcohol use No       Family History:  History reviewed. No pertinent family history. Review of Systems:  Constitutional: negative fever, negative chills, negative weight loss  Eyes:   negative visual changes  ENT:   negative sore throat, tongue or lip swelling  Respiratory:  negative cough, negative dyspnea  Cards:  negative for chest pain, palpitations, lower extremity edema  GI:   See HPI  :  negative for dysuria; +frequency  Integument:  negative for rash and pruritus  Heme:  negative for easy bruising and gum/nose bleeding  Musculoskeletal:negative for myalgias, back pain and muscle weakness  Neuro:    +for headaches, dizziness, (relates to pain medication and/or steroids)  Psych: negative for feelings of anxiety, depression     Objective:   Patient Vitals for the past 8 hrs:   BP Temp Pulse Resp SpO2   10/11/17 1500 157/79 97.9 °F (36.6 °C) 65 15 -   10/11/17 1100 185/84 97.7 °F (36.5 °C) 65 18 98 %   10/11/17 0933 156/79 - 70 - -     10/11 0701 - 10/11 1900  In: -   Out: 850 [Urine:850]  10/09 1901 - 10/11 0700  In: -   Out: 600 [Urine:600]    EXAM:     CONST:  Pleasant lady lying in bed, no acute distress   NEURO:  alert and oriented x 3   HEENT: EOMI, no scleral icterus   LUNGS: clear to ausculation, (-) wheeze   CARD:  regular rate and rhythm, S1 S2   ABD:  soft, no tenderness, no rebound, bowel sounds (+) all 4 quadrants, no masses, non distended   EXT:  no edema, warm   PSYCH: full, not anxious     Data Review     No results for input(s): WBC, HGB, HCT, PLT, HGBEXT, HCTEXT, PLTEXT in the last 72 hours.   Recent Labs      10/11/17   0337   NA  130*   K  4.6   CL  95*   CO2  26   BUN  11   CREA  0.54*   GLU  135*   PHOS  3.3   CA  8.0*     Recent Labs      10/11/17   0337   SGOT  12*   AP  70   TP  6.9   ALB  3.4*   GLOB  3.5 Recent Labs      10/11/17   0337   INR  1.1   PTP  10.8   SMALL BOWEL: No dilatation or wall thickening. COLON: Prominent amount of stool within the right colon. Assessment:   · Constipation     Patient Active Problem List   Diagnosis Code    UTI (urinary tract infection) N39.0    Hypothyroidism E03.9    Hypertension I10    Hypercholesteremia E78.00    Diabetes ([address] E11.9    Back pain M54.9    Vomiting R11.10    Mass of spinal cord ([facility] [address] G95.9     Plan:     · Plan for PEG 3350 as tolerated  · Ok to hold off on Lactulose  · Continue supportive care  · Dr. Amaya Abebe to follow tomorrow  · Thank you for allowing me to participate in care of Teodora Olson     Signed By: Gisele Segura NP     10/11/2017  4:33 PM       GI attending note:    Gen: NAD; Cardiac: nml S1, S2; Pulm: CTA B; Abd: normoactive BS, nt, nd, no rebound/guarding. Vitals, labs, and imaging reviewed. Agree with the history, physical, and plan of the NP. CT scan reviewed. Mainly R sided constipation. Recent opioid use. If no improvement with PEG, consider Relistor. Pt of Dr. Aron Bustillos. Dr. Amaya Abebe to follow tomorrow. Thank you for this consultation.     Dr. Mohamud Charlton

## 2017-10-11 NOTE — PROGRESS NOTES
Stat Na came back at 124  Stopped NS  Ordered Tolvaptan 15 mg now   Serial Na checks      Alphonso Shore MD

## 2017-10-11 NOTE — INTERDISCIPLINARY ROUNDS
IDR/SLIDR Summary          Patient: Erlinda Harry MRN: [id number]    Age: 57 y.o. YOB: 1955 Room/Bed: [facility]   Admit Diagnosis: Mass of spinal cord (HCC)  Principal Diagnosis: Back pain   Goals: voiding, pain control  Readmission: YES  Quality Measure: Not applicable  VTE Prophylaxis: Mechanical  Influenza Vaccine screening completed? YES  Pneumococcal Vaccine screening completed? YES  Mobility needs: No   Nutrition plan:No  Consults:P.T, O.T. and Case Management    Financial concerns:No  Escalated to CM? NO  RRAT Score: ***   Interventions:  Testing due for pt today?  YES  LOS: 1 days Expected length of stay 2 days  Discharge plan: TBD   PCP: Rukhsana Boone MD  Transportation needs: No    Days before discharge:two or more days before discharge   Discharge disposition: TBD    Signed:     Lyndsay Guerrero RN  10/11/2017  8:30 AM

## 2017-10-11 NOTE — CONSULTS
[facility]   [address] [facility], [address] [place] [address], [facility]  Phone: [phone] NOTE     Patient: Erlinda Harry MRN: [id number]  PCP: Rukhsana Boone MD   :     1955  Age:   57 y.o. Sex:  female      Referring physician: Km Martinez MD  Reason for consultation: 57 y.o. female with Mass of spinal cord [facility]) complicated by Hyponatremia   Admission Date: 10/10/2017  4:24 PM  LOS: 1 day      ASSESSMENT and PLAN :   Acute Hyponatremia :  - asymptomatic   - etiology : SIADH from severe pain. No evidence of Malignancy on imaging   - less likely related to UTI  - check stat Na level as she has been on isotonic saline   - Check TSH as this has not been checked in over an year   - If Na < 130, stop NS and give Tolvaptan 15 mg   - If Na > 130, continue saline and keep her on 1500 cc water restriction   - she is scheduled for PEG 3350 for constipation - ok to give     Recent Klebsiella UTI   - UA was unremarkable     Intradural, Extramedullary Spinal mass T12-L1  - per Neuro Sx    Constipation :  - likely 2/2 Opioids     Type II DM   HTN       Discussed plans w/ daughter at bedside and RN   Thank you for asking me to see Ms Danna Weinstein        Subjective:   HPI: Erlinda Harry is a 57 y.o.  female who has been admitted to the hospital for management of spinal mass like lesion seen on out pt MRI   Ms Danna Weinstein developed intense back, lower abdominal band like pain 10 days ago   This was followed some tingling and numbness in Left upper thigh a few days later   She had been to ER where she had UA which was normal .But urine Cx grew [place] [id number] colonies. She was given Cipro   She was given opioids for back pain. She was taking ibuprofen on top with unremitting back pain   She subsequently developed nausea and vomiting that lasted for a day  After another ER visit , she was referred to ortho for Spine MRI.  Spine MRI showed mass like lesion in T12-L1 with intra dural hemorrhage   A screening CT was done to exclude a metastatic process and was negative for malignancy   Her labs today showed Na was 130. previously she had normal sodium during ER visits    has long standing hypothyroidism and TFT not done in a year   She had prior negative colonoscopies         Past Medical Hx:   Past Medical History:   Diagnosis Date    Diabetes ([address]     type 2    Hypercholesteremia     Hypertension     Hypothyroidism         Past Surgical Hx:   History reviewed. No pertinent surgical history. Medications:  Prior to Admission medications    Medication Sig Start Date End Date Taking? Authorizing Provider   ALPRAZolam Alonza Settle) 0.25 mg tablet Take 0.125-0.25 mg by mouth two (2) times daily as needed for Anxiety (with steroid). Yes Historical Provider   traMADol (ULTRAM) 50 mg tablet Take 50 mg by mouth every six (6) hours as needed for Pain. Yes Historical Provider   ciprofloxacin HCl (CIPRO) 500 mg tablet Take 1 Tab by mouth every twelve (12) hours. 10/7/17  Yes Marly Lovell MD   HYDROcodone-acetaminophen (NORCO) 5-325 mg per tablet Take 1 Tab by mouth every four (4) hours as needed. Max Daily Amount: 6 Tabs. PRN severe pain 10/7/17  Yes Marly Lovell MD   ondansetron (ZOFRAN ODT) 4 mg disintegrating tablet Take 1 Tab by mouth every six (6) hours as needed. 10/7/17  Yes Marly Lovell MD   methylPREDNISolone (MEDROL, BREANNE,) 4 mg tablet Take 1 Tab by mouth Specific Days and Specific Times. 10/7/17  Yes Marly Lovell MD   metoprolol succinate (TOPROL-XL) 50 mg XL tablet Take 50 mg by mouth daily. Yes Sharmila Benitez MD   pravastatin (PRAVACHOL) 40 mg tablet Take 40 mg by mouth nightly. Yes Sharmila Benitez MD   levothyroxine (SYNTHROID) 75 mcg tablet Take 75 mcg by mouth Daily (before breakfast). Yes Sharmila Benitez MD   metFORMIN ER (GLUCOPHAGE XR) 750 mg tablet Take 750 mg by mouth daily (after dinner).    Yes Sharmila Benitez MD       No Known Allergies    Social Hx:  reports that she has never smoked. She has never used smokeless tobacco. She reports that she does not drink alcohol. History reviewed. No pertinent family history. Review of Systems:  A twelve point review of system was performed today. Pertinent positives and negatives are mentioned in the HPI. The reminder of the ROS is negative and noncontributory. Objective:    Vitals:    Vitals:    10/11/17 0815 10/11/17 0933 10/11/17 1100 10/11/17 1500   BP:  156/79 185/84 157/79   Pulse:  70 65 65   Resp:   18 15   Temp:   97.7 °F (36.5 °C) 97.9 °F (36.6 °C)   SpO2: 97%  98%    Weight:       Height:         I&O's:  10/10 0701 - 10/11 0700  In: -   Out: 600 [Urine:600]  Visit Vitals    /79 (BP 1 Location: Left arm, BP Patient Position: At rest)    Pulse 65    Temp 97.9 °F (36.6 °C)    Resp 15    Ht 5' 3\" (1.6 m)    Wt 50.8 kg (112 lb 1.6 oz)    SpO2 98%    BMI 19.86 kg/m2       Physical Exam:  General:Alert, No distress,   HEENT: Eyes are PERRL. Conjunctiva without pallor ,erythema. The sclerae without icterus. .   Neck:Supple,no mass palpable  Lungs : Clears to auscultation Bilaterally, Normal respiratory effort  CVS: RRR, S1 S2 normal, No rub, no  LE edema  Abdomen: Soft, Non tender, No hepatosplenomegaly, bowel sounds present  Extremities: No cyanosis, No clubbing  Skin: No rash or lesions. Lymph nodes: No palpable nodes  Neurologic: non focal, AAO x 3  Psych: normal affect    Laboratory Results:    Recent Labs      10/11/17   0337   NA  130*   K  4.6   CL  95*   CO2  26   GLU  135*   BUN  11   CREA  0.54*   CA  8.0*   MG  2.6*   PHOS  3.3   ALB  3.4*   SGOT  12*   ALT  19   INR  1.1     No results for input(s): WBC, HGB, HCT, PLT, HGBEXT, HCTEXT, PLTEXT in the last 72 hours.   No results found for: [facility]  Lab Results   Component Value Date/Time    Culture result: KLEBSIELLA PNEUMONIAE 10/02/2017 09:48 AM     Recent Results (from the past 24 hour(s))   URINALYSIS W/MICROSCOPIC    Collection Time: 10/10/17  7:11 PM Result Value Ref Range    Color YELLOW/STRAW      Appearance CLEAR CLEAR      Specific gravity 1.022 1.003 - 1.030      pH (UA) 6.5 5.0 - 8.0      Protein NEGATIVE  NEG mg/dL    Glucose 100 (A) NEG mg/dL    Ketone NEGATIVE  NEG mg/dL    Bilirubin NEGATIVE  NEG      Blood NEGATIVE  NEG      Urobilinogen 0.2 0.2 - 1.0 EU/dL    Nitrites NEGATIVE  NEG      Leukocyte Esterase NEGATIVE  NEG      WBC 0-4 0 - 4 /hpf    RBC 0-5 0 - 5 /hpf    Epithelial cells FEW FEW /lpf    Bacteria NEGATIVE  NEG /hpf   URINE CULTURE HOLD SAMPLE    Collection Time: 10/10/17  7:11 PM   Result Value Ref Range    Urine culture hold URINE ON HOLD IN MICROBIOLOGY DEPT FOR 3 DAYS     GLUCOSE, POC    Collection Time: 10/11/17 12:17 AM   Result Value Ref Range    Glucose (POC) 145 (H) 65 - 100 mg/dL    Performed by Jerome Yoder    METABOLIC PANEL, COMPREHENSIVE    Collection Time: 10/11/17  3:37 AM   Result Value Ref Range    Sodium 130 (L) 136 - 145 mmol/L    Potassium 4.6 3.5 - 5.1 mmol/L    Chloride 95 (L) 97 - 108 mmol/L    CO2 26 21 - 32 mmol/L    Anion gap 9 5 - 15 mmol/L    Glucose 135 (H) 65 - 100 mg/dL    BUN 11 6 - 20 MG/DL    Creatinine 0.54 (L) 0.55 - 1.02 MG/DL    BUN/Creatinine ratio 20 12 - 20      GFR est AA >60 >60 ml/min/1.73m2    GFR est non-AA >60 >60 ml/min/1.73m2    Calcium 8.0 (L) 8.5 - 10.1 MG/DL    Bilirubin, total 0.6 0.2 - 1.0 MG/DL    ALT (SGPT) 19 12 - 78 U/L    AST (SGOT) 12 (L) 15 - 37 U/L    Alk.  phosphatase 70 45 - 117 U/L    Protein, total 6.9 6.4 - 8.2 g/dL    Albumin 3.4 (L) 3.5 - 5.0 g/dL    Globulin 3.5 2.0 - 4.0 g/dL    A-G Ratio 1.0 (L) 1.1 - 2.2     PROTHROMBIN TIME + INR    Collection Time: 10/11/17  3:37 AM   Result Value Ref Range    INR 1.1 0.9 - 1.1      Prothrombin time 10.8 9.0 - 11.1 sec   MAGNESIUM    Collection Time: 10/11/17  3:37 AM   Result Value Ref Range    Magnesium 2.6 (H) 1.6 - 2.4 mg/dL   PHOSPHORUS    Collection Time: 10/11/17  3:37 AM   Result Value Ref Range    Phosphorus 3.3 2.6 - 4.7 MG/DL   GLUCOSE, POC    Collection Time: 10/11/17  6:51 AM   Result Value Ref Range    Glucose (POC) 140 (H) 65 - 100 mg/dL    Performed by Saundra Dumont, POC    Collection Time: 10/11/17 12:25 PM   Result Value Ref Range    Glucose (POC) 115 (H) 65 - 100 mg/dL    Performed by Esa Brwonlee, UR    Collection Time: 10/11/17 12:44 PM   Result Value Ref Range    Osmolality,urine 343 MOSM/kg H2O   SODIUM, UR, RANDOM    Collection Time: 10/11/17 12:44 PM   Result Value Ref Range    Sodium urine, random 72 MMOL/L   GLUCOSE, POC    Collection Time: 10/11/17  5:17 PM   Result Value Ref Range    Glucose (POC) 156 (H) 65 - 100 mg/dL    Performed by Alba Singer            Urine dipstick:   Lab Results   Component Value Date/Time    Color YELLOW/STRAW 10/10/2017 07:11 PM    Appearance CLEAR 10/10/2017 07:11 PM    Specific gravity 1.022 10/10/2017 07:11 PM    pH (UA) 6.5 10/10/2017 07:11 PM    Protein NEGATIVE  10/10/2017 07:11 PM    Glucose 100 10/10/2017 07:11 PM    Ketone NEGATIVE  10/10/2017 07:11 PM    Bilirubin NEGATIVE  10/10/2017 07:11 PM    Urobilinogen 0.2 10/10/2017 07:11 PM    Nitrites NEGATIVE  10/10/2017 07:11 PM    Leukocyte Esterase NEGATIVE  10/10/2017 07:11 PM    Epithelial cells FEW 10/10/2017 07:11 PM    Bacteria NEGATIVE  10/10/2017 07:11 PM    WBC 0-4 10/10/2017 07:11 PM    RBC 0-5 10/10/2017 07:11 PM        Medications list Personally Reviewed   [x]      Yes     []               No      Thank you for allowing us to participate in the care of this patient. We will follow patient. Please dont hesitate to call with any questions    Jada Guerin MD  [facility] [address]  Phone - [phone]   Fax - [phone]  [email or web address]

## 2017-10-11 NOTE — PROGRESS NOTES
Neurosurgery Progress Note  Tan Trejo ACNP-BC  [phone]        Admit Date: 10/10/2017   LOS: 1 day        Daily Progress Note: 10/11/2017      Subjective: The patient presented to the ER with 10 days of severe back pain. She developed significant back pain and chills while standing for a long time at a wedding. Her back has been located around her lower abdomen and pelvis and has been accompanied with right upper thigh numbness. It has been difficult to walk due to pain. She has also had some issues with nausea and vomiting and occasional headaches. She has been to ER twice and was admitted to [facility] for treatment of a Klebsiella UTI. She was also seen by orthopedics, diagnosed with spinal stenosis and given instructions to follow-up as an outpatient. She saw Dr. Erin Calvert in the office and had an MRI completed of her lumbar spine and was found to have an intradural, extramedullary mass at the level of T12-L1. The patient was sent to the ER for evaluation by Dr. Maribel Khan. It appears that there is intradural hemorrhage in the lumbar area as well. The patient was admitted for further work-up. Her CTA of her head and neck was negative for aneurysm or subarachnoid hemorrhage. No intracranial lesions were found. No vascular abnormalities. She underwent staging CTs to look for a source of cancer in her body.     Objective:     Vital signs  Temp (24hrs), Av °F (36.7 °C), Min:97.7 °F (36.5 °C), Max:98.5 °F (36.9 °C)   10/11 0701 - 10/11 1900  In: -   Out: 850 [Urine:850]  10/09 1901 - 10/11 0700  In: -   Out: 600 [Urine:600]    Visit Vitals    /84 (BP 1 Location: Left arm, BP Patient Position: At rest)  Comment: patient given pain medication    Pulse 65    Temp 97.7 °F (36.5 °C)    Resp 18    Ht 5' 3\" (1.6 m)    Wt 50.8 kg (112 lb 1.6 oz)    SpO2 98%    BMI 19.86 kg/m2      O2 Device: Room air     Pain control  Pain Assessment  Pain Scale 1: Numeric (0 - 10)  Pain Intensity 1: 6  Pain Onset 1: chronic  Pain Location 1: Back  Pain Orientation 1: Lower  Pain Description 1: Aching, Heaviness (intermittent)    PT/OT  Gait                 Physical Exam:  Gen:NAD. Neuro: A&Ox3. Follows commands. Speech clear. Affect normal.  PERRL. EOMI. Face symmetric. Palate symmetric. Tongue midline. GLYNN spontaneously. Generalized weakness. Negative drift. Shortened steps with handhold assist  Abd soft, hypoactive bowel sounds, tender inferiorly to palpation. No guarding. MRI brain with and without contrast on 10/10/17 shows there is no intracranial mass, hemorrhage, acute infarction. No evidence of intracranial metastatic disease. Minimal increase scattered foci of T2 signal intensity in the cerebral white  matter most likely related to minimal chronic microvascular ischemic change. MRI C-spine with and without contrast on 10/10/17 shows minimal multilevel degenerative change. No mass, epidural hemorrhage, cord signal abnormality or abnormal intramedullary enhancement. MRI T-spine with and without contrast on 10/10/17 shows solitary enhancing small intradural extra medullary mass in the right posterior spinal canal at the level of T12-L1. Imaging features of the mass favor a benign etiology, as discussed above. However, the presence of subacute hemorrhage in the lower thoracic spinal canal in proximity to the mass is of concern for hemorrhage related to the tumor, which could represent intradural metastatic disease or paraganglioma. MRI L-spine with and without contrast on 10/10/17 shows 0.9 x 8.3 x 7.1 mm T1 and T2 hypointense mildly enhancing, intradural extramedullary mass lesion at the T12-L1 level. Lesion is eccentric lying posteriorly into the right of the cord. Primary spinal neoplastic process versus metastatic etiology. Ependymoma and astrocytoma felt to be less likely due to the eccentric location, enhancement pattern.  There is associated intradural hemorrhage which extends slightly superior to the lesion caudally to the inferior aspect of the thecal sac. CT chest/abd/pelvis with and without contrast on 10/10/17 shows 9 mm noncalcified pulmonary nodule left lower lobe. Consider PET scan to further  evaluate. This is unlikely related to the patient's spinal lesion. 24 hour results:    Recent Results (from the past 24 hour(s))   URINALYSIS W/MICROSCOPIC    Collection Time: 10/10/17  7:11 PM   Result Value Ref Range    Color YELLOW/STRAW      Appearance CLEAR CLEAR      Specific gravity 1.022 1.003 - 1.030      pH (UA) 6.5 5.0 - 8.0      Protein NEGATIVE  NEG mg/dL    Glucose 100 (A) NEG mg/dL    Ketone NEGATIVE  NEG mg/dL    Bilirubin NEGATIVE  NEG      Blood NEGATIVE  NEG      Urobilinogen 0.2 0.2 - 1.0 EU/dL    Nitrites NEGATIVE  NEG      Leukocyte Esterase NEGATIVE  NEG      WBC 0-4 0 - 4 /hpf    RBC 0-5 0 - 5 /hpf    Epithelial cells FEW FEW /lpf    Bacteria NEGATIVE  NEG /hpf   URINE CULTURE HOLD SAMPLE    Collection Time: 10/10/17  7:11 PM   Result Value Ref Range    Urine culture hold URINE ON HOLD IN MICROBIOLOGY DEPT FOR 3 DAYS     GLUCOSE, POC    Collection Time: 10/11/17 12:17 AM   Result Value Ref Range    Glucose (POC) 145 (H) 65 - 100 mg/dL    Performed by Antonina Flaherty    METABOLIC PANEL, COMPREHENSIVE    Collection Time: 10/11/17  3:37 AM   Result Value Ref Range    Sodium 130 (L) 136 - 145 mmol/L    Potassium 4.6 3.5 - 5.1 mmol/L    Chloride 95 (L) 97 - 108 mmol/L    CO2 26 21 - 32 mmol/L    Anion gap 9 5 - 15 mmol/L    Glucose 135 (H) 65 - 100 mg/dL    BUN 11 6 - 20 MG/DL    Creatinine 0.54 (L) 0.55 - 1.02 MG/DL    BUN/Creatinine ratio 20 12 - 20      GFR est AA >60 >60 ml/min/1.73m2    GFR est non-AA >60 >60 ml/min/1.73m2    Calcium 8.0 (L) 8.5 - 10.1 MG/DL    Bilirubin, total 0.6 0.2 - 1.0 MG/DL    ALT (SGPT) 19 12 - 78 U/L    AST (SGOT) 12 (L) 15 - 37 U/L    Alk.  phosphatase 70 45 - 117 U/L    Protein, total 6.9 6.4 - 8.2 g/dL    Albumin 3.4 (L) 3.5 - 5.0 g/dL Globulin 3.5 2.0 - 4.0 g/dL    A-G Ratio 1.0 (L) 1.1 - 2.2     PROTHROMBIN TIME + INR    Collection Time: 10/11/17  3:37 AM   Result Value Ref Range    INR 1.1 0.9 - 1.1      Prothrombin time 10.8 9.0 - 11.1 sec   MAGNESIUM    Collection Time: 10/11/17  3:37 AM   Result Value Ref Range    Magnesium 2.6 (H) 1.6 - 2.4 mg/dL   PHOSPHORUS    Collection Time: 10/11/17  3:37 AM   Result Value Ref Range    Phosphorus 3.3 2.6 - 4.7 MG/DL   GLUCOSE, POC    Collection Time: 10/11/17  6:51 AM   Result Value Ref Range    Glucose (POC) 140 (H) 65 - 100 mg/dL    Performed by Salbador Chan    GLUCOSE, POC    Collection Time: 10/11/17 12:25 PM   Result Value Ref Range    Glucose (POC) 115 (H) 65 - 100 mg/dL    Performed by Keyla Jones, UR    Collection Time: 10/11/17 12:44 PM   Result Value Ref Range    Osmolality,urine 343 MOSM/kg H2O   SODIUM, UR, RANDOM    Collection Time: 10/11/17 12:44 PM   Result Value Ref Range    Sodium urine, random 72 MMOL/L          Assessment:     Principal Problem:    Back pain (10/5/2017)    Active Problems:    Hypertension ()      Diabetes (HCC) ()      Overview: type 2      Mass of spinal cord ([facility] [address] (10/10/2017)        Plan:   1. Intradural, extramedullary mass with intradural hemorrhage   - Will need surgery next week after we finish her work-up   - Cont decadron   - PT eval in am. Pearlene Foil for patient to walk with daughter  2. Diabetes mellitus, type 2   - Hold metformin   - Hospitalist consult  3. Hyponatremia   - Na 130   - Urine Na 72, Urine osmo 343   - Nephrology consulted  4. Dilated colon with stool impaction on the right   - Spoke with GI. They will see the patient today   - Cont clear liquid diet until after patient has seen GI  5. HTN   - Cont metoprolol   - Hydralazine and labetalol PRN   - SBP<140  6. Hypothyroidism   - Cont levothyroxine   - Check thyroid labs in am  7. Dyslipidemia   - Cont pravastatin  8.  Urinary retention   - Salmeron placed    Activity: up with assist  DVT ppx: SCDs  Dispo: tbd    Plan d/w Dr. Toñito Fan and nurse as well as daughter at bedside.       Lorri Mcpherson NP

## 2017-10-11 NOTE — PROGRESS NOTES
Bedside shift change report given to Ramsey Dugan (oncoming nurse) by Rosalina Monteiro (offgoing nurse). Report included the following information SBAR, Kardex, Procedure Summary, Intake/Output, MAR, Accordion, Recent Results and Med Rec Status.

## 2017-10-11 NOTE — ROUTINE PROCESS
Primary Nurse Valarie Gayle RN and Radha Navarro RN performed a dual skin assessment on this patient No impairment noted  Rizwan score is 16

## 2017-10-11 NOTE — DIABETES MGMT
DTC Consult Note    Recommendations/ Comments: BG results 115 - 145 mg/dL. A1c 6.5%  Admitted with severe back pain. Noted lispro high sensitivity correction scale ordered. Please consider the following:  Document po intake    DTC will continue to follow patient as needed. ____________________________    Consult received for:   []           Hospital Medication Recommendations                 [x]           Hospital Blood Glucose Management    Chart reviewed and initial evaluation complete on Faye Fernandes. Patient is a 57 y.o. female with known DM on Metformin 750 mg dialy at supper at home. A1c:   Lab Results   Component Value Date/Time    Hemoglobin A1c 6.5 10/06/2017 03:48 AM       Recent Glucose Results:   Lab Results   Component Value Date/Time     (H) 10/11/2017 03:37 AM    GLUCPOC 115 (H) 10/11/2017 12:25 PM    GLUCPOC 140 (H) 10/11/2017 06:51 AM    GLUCPOC 145 (H) 10/11/2017 12:17 AM        Lab Results   Component Value Date/Time    Creatinine 0.54 10/11/2017 03:37 AM       Active Orders   Diet    DIET CLEAR LIQUID        PO intake: No data found. Current hospital DM medication: lispro high sensitivity correction scale    Thank you.   Erasto Cabral RN, CDE

## 2017-10-11 NOTE — INTERDISCIPLINARY ROUNDS
IDR/SLIDR Summary          Patient: Pato Dewitt MRN: [id number]    Age: 57 y.o. YOB: 1955 Room/Bed: [facility]   Admit Diagnosis: Mass of spinal cord (HCC)  Principal Diagnosis: Back pain   Goals: ***  Readmission: {YES/NO:94871}  Quality Measure: {Quality Measures:30711}  VTE Prophylaxis: {VTE Prophylaxis:21798}  Influenza Vaccine screening completed? {YES/NO:45329}  Pneumococcal Vaccine screening completed? {YES/NO:50672}  Mobility needs: {Yes/No Caps:43192}   Nutrition plan:{Yes/No Caps:99533}  Consults:{Consult needed:21347}    Financial concerns:{Yes/No Caps:22126}  Escalated to CM? {YES/NO:87440}  RRAT Score: ***   Interventions:{Intervention:21348}  Testing due for pt today?  {YES/NO:04593}  LOS: 1 days Expected length of stay *** days  Discharge plan: ***   PCP: Brian Marion MD  Transportation needs: {Yes/No Caps:85168}    Days before discharge:{Days before Discharge:21344}  Discharge disposition: {Discharge Destination:79945::\"Home\"}    Signed:     Selena Marcum RN  10/11/2017  8:30 AM

## 2017-10-12 LAB
ANION GAP SERPL CALC-SCNC: 7 MMOL/L (ref 5–15)
BUN SERPL-MCNC: 10 MG/DL (ref 6–20)
BUN/CREAT SERPL: 17 (ref 12–20)
CALCIUM SERPL-MCNC: 8.9 MG/DL (ref 8.5–10.1)
CHLORIDE SERPL-SCNC: 97 MMOL/L (ref 97–108)
CO2 SERPL-SCNC: 29 MMOL/L (ref 21–32)
CREAT SERPL-MCNC: 0.59 MG/DL (ref 0.55–1.02)
GLUCOSE BLD STRIP.AUTO-MCNC: 140 MG/DL (ref 65–100)
GLUCOSE BLD STRIP.AUTO-MCNC: 183 MG/DL (ref 65–100)
GLUCOSE BLD STRIP.AUTO-MCNC: 189 MG/DL (ref 65–100)
GLUCOSE BLD STRIP.AUTO-MCNC: 191 MG/DL (ref 65–100)
GLUCOSE BLD STRIP.AUTO-MCNC: 224 MG/DL (ref 65–100)
GLUCOSE SERPL-MCNC: 153 MG/DL (ref 65–100)
OSMOLALITY UR: 166 MOSM/KG H2O
POTASSIUM SERPL-SCNC: 4.4 MMOL/L (ref 3.5–5.1)
SERVICE CMNT-IMP: ABNORMAL
SODIUM SERPL-SCNC: 133 MMOL/L (ref 136–145)
SODIUM SERPL-SCNC: 136 MMOL/L (ref 136–145)
SODIUM SERPL-SCNC: 136 MMOL/L (ref 136–145)
SODIUM UR-SCNC: 25 MMOL/L
T4 FREE SERPL-MCNC: 1.4 NG/DL (ref 0.8–1.5)
TSH SERPL DL<=0.05 MIU/L-ACNC: 0.69 UIU/ML (ref 0.36–3.74)

## 2017-10-12 PROCEDURE — 74011250637 HC RX REV CODE- 250/637: Performed by: NURSE PRACTITIONER

## 2017-10-12 PROCEDURE — 82962 GLUCOSE BLOOD TEST: CPT

## 2017-10-12 PROCEDURE — 99218 HC RM OBSERVATION: CPT

## 2017-10-12 PROCEDURE — 80048 BASIC METABOLIC PNL TOTAL CA: CPT | Performed by: INTERNAL MEDICINE

## 2017-10-12 PROCEDURE — 74011636637 HC RX REV CODE- 636/637: Performed by: NURSE PRACTITIONER

## 2017-10-12 PROCEDURE — 74011250636 HC RX REV CODE- 250/636: Performed by: NURSE PRACTITIONER

## 2017-10-12 PROCEDURE — 84439 ASSAY OF FREE THYROXINE: CPT | Performed by: NURSE PRACTITIONER

## 2017-10-12 PROCEDURE — 84300 ASSAY OF URINE SODIUM: CPT | Performed by: INTERNAL MEDICINE

## 2017-10-12 PROCEDURE — 96376 TX/PRO/DX INJ SAME DRUG ADON: CPT

## 2017-10-12 PROCEDURE — 83935 ASSAY OF URINE OSMOLALITY: CPT | Performed by: INTERNAL MEDICINE

## 2017-10-12 PROCEDURE — 65660000000 HC RM CCU STEPDOWN

## 2017-10-12 PROCEDURE — 84443 ASSAY THYROID STIM HORMONE: CPT | Performed by: NURSE PRACTITIONER

## 2017-10-12 PROCEDURE — 84295 ASSAY OF SERUM SODIUM: CPT | Performed by: INTERNAL MEDICINE

## 2017-10-12 PROCEDURE — 36415 COLL VENOUS BLD VENIPUNCTURE: CPT | Performed by: NURSE PRACTITIONER

## 2017-10-12 PROCEDURE — 74011250637 HC RX REV CODE- 250/637: Performed by: INTERNAL MEDICINE

## 2017-10-12 RX ORDER — LABETALOL HYDROCHLORIDE 5 MG/ML
10 INJECTION, SOLUTION INTRAVENOUS
Status: DISCONTINUED | OUTPATIENT
Start: 2017-10-12 | End: 2017-10-13 | Stop reason: HOSPADM

## 2017-10-12 RX ORDER — DEXTROSE 50 % IN WATER (D50W) INTRAVENOUS SYRINGE
12.5-25 AS NEEDED
Status: DISCONTINUED | OUTPATIENT
Start: 2017-10-12 | End: 2017-10-13 | Stop reason: HOSPADM

## 2017-10-12 RX ORDER — INSULIN LISPRO 100 [IU]/ML
INJECTION, SOLUTION INTRAVENOUS; SUBCUTANEOUS
Status: DISCONTINUED | OUTPATIENT
Start: 2017-10-12 | End: 2017-10-13 | Stop reason: HOSPADM

## 2017-10-12 RX ORDER — DOCUSATE SODIUM 100 MG/1
100 CAPSULE, LIQUID FILLED ORAL
Status: DISCONTINUED | OUTPATIENT
Start: 2017-10-12 | End: 2017-10-12

## 2017-10-12 RX ORDER — SODIUM CHLORIDE 0.9 % (FLUSH) 0.9 %
SYRINGE (ML) INJECTION
Status: COMPLETED
Start: 2017-10-12 | End: 2017-10-12

## 2017-10-12 RX ORDER — INSULIN GLARGINE 100 [IU]/ML
3 INJECTION, SOLUTION SUBCUTANEOUS ONCE
Status: COMPLETED | OUTPATIENT
Start: 2017-10-12 | End: 2017-10-12

## 2017-10-12 RX ORDER — HYDRALAZINE HYDROCHLORIDE 20 MG/ML
10 INJECTION INTRAMUSCULAR; INTRAVENOUS
Status: DISCONTINUED | OUTPATIENT
Start: 2017-10-12 | End: 2017-10-13 | Stop reason: HOSPADM

## 2017-10-12 RX ORDER — MAGNESIUM SULFATE 100 %
4 CRYSTALS MISCELLANEOUS AS NEEDED
Status: DISCONTINUED | OUTPATIENT
Start: 2017-10-12 | End: 2017-10-13 | Stop reason: HOSPADM

## 2017-10-12 RX ADMIN — Medication 10 ML: at 14:51

## 2017-10-12 RX ADMIN — HYDROCODONE BITARTRATE AND ACETAMINOPHEN 1 TABLET: 5; 325 TABLET ORAL at 21:39

## 2017-10-12 RX ADMIN — DEXAMETHASONE SODIUM PHOSPHATE 2 MG: 4 INJECTION, SOLUTION INTRAMUSCULAR; INTRAVENOUS at 18:21

## 2017-10-12 RX ADMIN — HYDRALAZINE HYDROCHLORIDE 10 MG: 20 INJECTION INTRAMUSCULAR; INTRAVENOUS at 19:41

## 2017-10-12 RX ADMIN — HYDROCODONE BITARTRATE AND ACETAMINOPHEN 1 TABLET: 5; 325 TABLET ORAL at 01:16

## 2017-10-12 RX ADMIN — DEXAMETHASONE SODIUM PHOSPHATE 2 MG: 4 INJECTION, SOLUTION INTRAMUSCULAR; INTRAVENOUS at 09:20

## 2017-10-12 RX ADMIN — ALPRAZOLAM 0.25 MG: 0.5 TABLET ORAL at 21:33

## 2017-10-12 RX ADMIN — LACTULOSE 20 G: 20 SOLUTION ORAL at 09:19

## 2017-10-12 RX ADMIN — DEXAMETHASONE SODIUM PHOSPHATE 2 MG: 4 INJECTION, SOLUTION INTRAMUSCULAR; INTRAVENOUS at 01:07

## 2017-10-12 RX ADMIN — HYDROMORPHONE HYDROCHLORIDE 0.5 MG: 1 INJECTION, SOLUTION INTRAMUSCULAR; INTRAVENOUS; SUBCUTANEOUS at 05:37

## 2017-10-12 RX ADMIN — LEVOTHYROXINE SODIUM 75 MCG: 150 TABLET ORAL at 07:03

## 2017-10-12 RX ADMIN — ACETAMINOPHEN 650 MG: 325 TABLET, FILM COATED ORAL at 18:28

## 2017-10-12 RX ADMIN — Medication 1 CAPSULE: at 09:18

## 2017-10-12 RX ADMIN — HYDROMORPHONE HYDROCHLORIDE 0.5 MG: 1 INJECTION, SOLUTION INTRAMUSCULAR; INTRAVENOUS; SUBCUTANEOUS at 13:20

## 2017-10-12 RX ADMIN — PRAVASTATIN SODIUM 40 MG: 40 TABLET ORAL at 21:33

## 2017-10-12 RX ADMIN — INSULIN LISPRO 2 UNITS: 100 INJECTION, SOLUTION INTRAVENOUS; SUBCUTANEOUS at 21:32

## 2017-10-12 RX ADMIN — INSULIN GLARGINE 3 UNITS: 100 INJECTION, SOLUTION SUBCUTANEOUS at 14:30

## 2017-10-12 RX ADMIN — LACTULOSE 20 G: 20 SOLUTION ORAL at 21:32

## 2017-10-12 RX ADMIN — ACETAMINOPHEN 650 MG: 325 TABLET, FILM COATED ORAL at 09:32

## 2017-10-12 RX ADMIN — METOPROLOL SUCCINATE 50 MG: 50 TABLET, EXTENDED RELEASE ORAL at 09:18

## 2017-10-12 RX ADMIN — LACTULOSE 20 G: 20 SOLUTION ORAL at 16:29

## 2017-10-12 RX ADMIN — FAMOTIDINE 20 MG: 20 TABLET ORAL at 18:21

## 2017-10-12 RX ADMIN — ACETAMINOPHEN 650 MG: 325 TABLET, FILM COATED ORAL at 13:28

## 2017-10-12 RX ADMIN — FAMOTIDINE 20 MG: 20 TABLET ORAL at 09:18

## 2017-10-12 NOTE — PROGRESS NOTES
0100 - pt alert and oriented. Called to have a walk around the unit. Pt did four laps and tolerated well. Pt complained of some abdominal pain after walk. Norco given. Pt finished 3L of golytely. Bedside shift change report given to Lindle Sandifer (oncoming nurse) by Armen Caputo (offgoing nurse). Report included the following information recent results, sbar, pt is nsr.

## 2017-10-12 NOTE — PROGRESS NOTES
Bedside shift change report given to [address] (oncoming nurse) by Shea Rose (offgoing nurse). Report included the following information SBAR, Kardex, ED Summary, Intake/Output, MAR, Accordion, Recent Results, Med Rec Status and Cardiac Rhythm NSR.

## 2017-10-12 NOTE — PROGRESS NOTES
Neurosurgery Progress Note  Sami Varela  [phone]        Admit Date: 10/10/2017   LOS: 2 days        Daily Progress Note: 10/12/2017    HPI: The patient presented to the ER with 10 days of severe back pain. She developed significant back pain and chills while standing for a long time at a wedding. Her back has been located around her lower abdomen and pelvis and has been accompanied with right upper thigh numbness. It has been difficult to walk due to pain. She has also had some issues with nausea and vomiting and occasional headaches. She has been to ER twice and was admitted to [facility] for treatment of a Klebsiella UTI. She was also seen by orthopedics, diagnosed with spinal stenosis and given instructions to follow-up as an outpatient. She saw Dr. Chase Walls in the office and had an MRI completed of her lumbar spine and was found to have an intradural, extramedullary mass at the level of T12-L1. The patient was sent to the ER for evaluation by Dr. Steffi Mcgregor. It appears that there is intradural hemorrhage in the lumbar area as well. The patient was admitted for further work-up. Her CTA of her head and neck was negative for aneurysm or subarachnoid hemorrhage. No intracranial lesions were found. No vascular abnormalities. She underwent staging CTs to look for a source of cancer in her body. Subjective: The patient had a bowel movement today around 1100 and had another one after that. She states her abdominal pain is improved. She still has her catheter in place. She received a dose of Tolvaptan last night at 2000. Her sodium jumped to 133 from 124 and then her repeat sodium to 136. She is up walking the halls with her daughter today and her walking is much improved from her initial admission in the ER. She states she still has back pain, but she seems more comfortable and her pain is controlled with Tylenol.      Objective:     Vital signs  Temp (24hrs), Av °F (36.7 °C), Min:97.9 °F (36.6 °C), Max:98.3 °F (36.8 °C)   10/12 0701 - 10/12 1900  In: -   Out: 250 [Urine:250]  10/10 1901 - 10/12 0700  In: 3000 [P.O.:3000]  Out: 7015 [Urine:4775]    Visit Vitals    /84 (BP 1 Location: Left arm, BP Patient Position: At rest)    Pulse 68    Temp 98.3 °F (36.8 °C)    Resp 16    Ht 5' 3\" (1.6 m)    Wt 47.1 kg (103 lb 13.4 oz)    SpO2 98%    BMI 18.39 kg/m2      O2 Device: Room air     Pain control  Pain Assessment  Pain Scale 1: Numeric (0 - 10)  Pain Intensity 1: 5  Pain Onset 1: chronic  Pain Location 1: Back  Pain Orientation 1: Lower  Pain Description 1: Aching  Pain Intervention(s) 1: Medication (see MAR)    PT/OT  Gait                 Physical Exam:  Gen:NAD. Neuro: A&Ox3. Follows commands. Speech clear. Affect normal.  PERRL. EOMI. Face symmetric. Palate symmetric. Tongue midline. GLYNN spontaneously. Generalized weakness. Negative drift. Gait with small steps but smoother gait      MRI brain with and without contrast on 10/10/17 shows there is no intracranial mass, hemorrhage, acute infarction. No evidence of intracranial metastatic disease. Minimal increase scattered foci of T2 signal intensity in the cerebral white  matter most likely related to minimal chronic microvascular ischemic change. MRI C-spine with and without contrast on 10/10/17 shows minimal multilevel degenerative change. No mass, epidural hemorrhage, cord signal abnormality or abnormal intramedullary enhancement. MRI T-spine with and without contrast on 10/10/17 shows solitary enhancing small intradural extra medullary mass in the right posterior spinal canal at the level of T12-L1. Imaging features of the mass favor a benign etiology, as discussed above. However, the presence of subacute hemorrhage in the lower thoracic spinal canal in proximity to the mass is of concern for hemorrhage related to the tumor, which could represent intradural metastatic disease or paraganglioma.      MRI L-spine with and without contrast on 10/10/17 shows 0.9 x 8.3 x 7.1 mm T1 and T2 hypointense mildly enhancing, intradural extramedullary mass lesion at the T12-L1 level. Lesion is eccentric lying posteriorly into the right of the cord. Primary spinal neoplastic process versus metastatic etiology. Ependymoma and astrocytoma felt to be less likely due to the eccentric location, enhancement pattern. There is associated intradural hemorrhage which extends slightly superior to the lesion caudally to the inferior aspect of the thecal sac. CT chest/abd/pelvis with and without contrast on 10/10/17 shows 9 mm noncalcified pulmonary nodule left lower lobe. Consider PET scan to further  evaluate. This is unlikely related to the patient's spinal lesion.     24 hour results:    Recent Results (from the past 24 hour(s))   GLUCOSE, POC    Collection Time: 10/11/17  5:17 PM   Result Value Ref Range    Glucose (POC) 156 (H) 65 - 100 mg/dL    [facility]    URIC ACID    Collection Time: 10/11/17  6:20 PM   Result Value Ref Range    Uric acid 3.6 2.6 - 6.0 MG/DL   METABOLIC PANEL, BASIC    Collection Time: 10/11/17  6:20 PM   Result Value Ref Range    Sodium 124 (L) 136 - 145 mmol/L    Potassium HEMOLYZED,RECOLLECT REQUESTED 3.5 - 5.1 mmol/L    Chloride 92 (L) 97 - 108 mmol/L    CO2 21 21 - 32 mmol/L    Anion gap 11 5 - 15 mmol/L    Glucose 184 (H) 65 - 100 mg/dL    BUN 11 6 - 20 MG/DL    Creatinine 0.70 0.55 - 1.02 MG/DL    BUN/Creatinine ratio 16 12 - 20      GFR est AA >60 >60 ml/min/1.73m2    GFR est non-AA >60 >60 ml/min/1.73m2    Calcium 8.1 (L) 8.5 - 10.1 MG/DL   GLUCOSE, POC    Collection Time: 10/11/17  8:45 PM   Result Value Ref Range    Glucose (POC) 147 (H) 65 - 100 mg/dL    Performed by [facility]    TSH 3RD GENERATION    Collection Time: 10/12/17  3:48 AM   Result Value Ref Range    TSH 0.69 0.36 - 3.74 uIU/mL   T4, FREE    Collection Time: 10/12/17  3:48 AM   Result Value Ref Range    T4, Free 1.4 0.8 - 1.5 NG/DL METABOLIC PANEL, BASIC    Collection Time: 10/12/17  3:48 AM   Result Value Ref Range    Sodium 133 (L) 136 - 145 mmol/L    Potassium 4.4 3.5 - 5.1 mmol/L    Chloride 97 97 - 108 mmol/L    CO2 29 21 - 32 mmol/L    Anion gap 7 5 - 15 mmol/L    Glucose 153 (H) 65 - 100 mg/dL    BUN 10 6 - 20 MG/DL    Creatinine 0.59 0.55 - 1.02 MG/DL    BUN/Creatinine ratio 17 12 - 20      GFR est AA >60 >60 ml/min/1.73m2    GFR est non-AA >60 >60 ml/min/1.73m2    Calcium 8.9 8.5 - 10.1 MG/DL   SODIUM, UR, RANDOM    Collection Time: 10/12/17  3:50 AM   Result Value Ref Range    Sodium urine, random 25 MMOL/L   OSMOLALITY, UR    Collection Time: 10/12/17  3:50 AM   Result Value Ref Range    Osmolality,urine 166 MOSM/kg H2O   GLUCOSE, POC    Collection Time: 10/12/17  6:52 AM   Result Value Ref Range    Glucose (POC) 140 (H) 65 - 100 mg/dL    Performed by [id number], POC    Collection Time: 10/12/17  8:15 AM   Result Value Ref Range    Glucose (POC) 191 (H) 65 - 100 mg/dL    Performed by Carloz Nugent    GLUCOSE, POC    Collection Time: 10/12/17 11:38 AM   Result Value Ref Range    Glucose (POC) 183 (H) 65 - 100 mg/dL    Performed by Carloz Gentleman    SODIUM    Collection Time: 10/12/17 11:48 AM   Result Value Ref Range    Sodium 136 136 - 145 mmol/L          Assessment:     Principal Problem:    Back pain (10/5/2017)    Active Problems:    Hypertension ()      Diabetes (HCC) ()      Overview: type 2      Mass of spinal cord ([address] (10/10/2017)        Plan:   1. Intradural, extramedullary mass with intradural hemorrhage   - Will need surgery next week after we finish her work-up   - Cont decadron   - ok to walk the floor with daughter  2. Diabetes mellitus, type 2   - Hold metformin   - Hospitalist consult  3. Hyponatremia   - Na 124. Gave dose of Tolvaptan 10/11. Na now 136   - Urine Na 72, Urine osmo 343   - Nephrology consulted   - Ask renal about Salmeron and if they are ok with it coming out  4.  Dilated colon with stool impaction on the right   - Spoke with GI. They will see the patient today   - Pt had BM after receiving bowel prep. 5. HTN   - Cont metoprolol   - Hydralazine and labetalol PRN   - SBP<160  6. Hypothyroidism   - Cont levothyroxine   - TSH 0.69, free T4 1.4  7. Dyslipidemia   - Cont pravastatin  8. Urinary retention   - Salmeron placed   - If ok with renal, would remove Salmeron and do a voiding trial    Activity: up with assist  DVT ppx: SCDs  Dispo: home    Will discuss plan with Dr. Obinna Jones. The patient will possibly be able to be discharged home either tomorrow or Saturday pending her sodium levels and have a planned readmission for surgery next week, likely Tuesday or Wednesday.       Erika Garcia NP

## 2017-10-12 NOTE — PROGRESS NOTES
Hospitalist Progress Note  Rodrigo Daniel NP  Answering service: [phone] OR [phone] from in house phone  Cell: [phone]      Date of Service:  10/12/2017  NAME:  Faye Fernandes  :  1955  MRN:  [id number]      Admission Summary:   57year old female with PMH of Hypertension, Diabetes, Hyperlipidemia, and chronic Back Pain who was admitted due to abnormality on MRI imaging of her lumbar spine. We are following along to manage her DM. Interval history / Subjective:     Blood glucose has been a bit high. SSI changed to Normal Scale. restart Metformin when appropriate   Discussed plan with patient and her daughter. Assessment & Plan:     DM  Hgb A1C 6.5  SSI increased to normal scale  Add Metformin when appropriate  Diabetic Diet  Lantus 3 units given today, may need adjustment    Hypertension  Metoprolol    Hyperlipidemia  Statin    Hypothyroidism  TSH 0.69, T4 1.4  Continue Synthroid    Intradural, extramedullary Mass with Intradural Hemorrhage  Per primary team    Code status: full  DVT prophylaxis: per primary team    Care Plan discussed with: Patient/Family and Nurse  Disposition: TBD     Hospital Problems  Date Reviewed: 10/10/2017          Codes Class Noted POA    Mass of spinal cord ([facility] ICD-10-CM: G95.9  ICD-9-CM: 336.9  10/10/2017 Unknown        Hypertension ICD-10-CM: I10  ICD-9-CM: 401.9  Unknown Yes        Diabetes ([facility] ICD-10-CM: E11.9  ICD-9-CM: 250.00  Unknown Yes    Overview Signed 10/5/2017  9:40 PM by Marley Jones MD     type 2             * (Principal)Back pain ICD-10-CM: M54.9  ICD-9-CM: 724.5  10/5/2017 Yes                Review of Systems:   A comprehensive review of systems was negative except for that written in the HPI. Vital Signs:    Last 24hrs VS reviewed since prior progress note.  Most recent are:  Visit Vitals    /84 (BP 1 Location: Left arm, BP Patient Position: At rest)    Pulse 68    Temp 98.3 °F (36.8 °C)    Resp 16    Ht 5' 3\" (1.6 m)    Wt 47.1 kg (103 lb 13.4 oz)    SpO2 98%    BMI 18.39 kg/m2         Intake/Output Summary (Last 24 hours) at 10/12/17 1417  Last data filed at 10/12/17 1132   Gross per 24 hour   Intake             3000 ml   Output             4175 ml   Net            -1175 ml        Physical Examination:             Constitutional:  No acute distress, cooperative, pleasant    ENT:  Oral mucous moist, oropharynx benign. Neck supple,    Resp:  CTA bilaterally. No wheezing/rhonchi/rales. No accessory muscle use   CV:  Regular rhythm, normal rate, no murmurs, gallops, rubs    GI:  Soft, non distended, non tender. normoactive bowel sounds, no hepatosplenomegaly     Musculoskeletal:  No edema, warm, 2+ pulses throughout    Neurologic:  Moves all extremities. AAOx3,tongue midline, follows commands     Psych:  Good insight, Not anxious nor agitated. Skin:  Good turgor, no rashes or ulcers       Data Review:    Review and/or order of clinical lab test  Review and/or order of tests in the radiology section of CPT      Labs:   No results for input(s): WBC, HGB, HCT, PLT, HGBEXT, HCTEXT, PLTEXT in the last 72 hours. Recent Labs      10/12/17   1148  10/12/17   0348  10/11/17   1820  10/11/17   0337   NA  136  133*  124*  130*   K   --   4.4  HEMOLYZED,RECOLLECT REQUESTED  4.6   CL   --   97  92*  95*   CO2   --   29  21  26   BUN   --   10  11  11   CREA   --   0.59  0.70  0.54*   GLU   --   153*  184*  135*   CA   --   8.9  8.1*  8.0*   MG   --    --    --   2.6*   PHOS   --    --    --   3.3   URICA   --    --   3.6   --      Recent Labs      10/11/17   0337   SGOT  12*   ALT  19   AP  70   TBILI  0.6   TP  6.9   ALB  3.4*   GLOB  3.5     Recent Labs      10/11/17   0337   INR  1.1   PTP  10.8      No results for input(s): FE, TIBC, PSAT, FERR in the last 72 hours. No results found for: FOL, RBCF   No results for input(s): PH, PCO2, PO2 in the last 72 hours.   No results for input(s): CPK, CKNDX, TROIQ in the last 72 hours.     No lab exists for component: CPKMB  No results found for: CHOL, CHOLX, CHLST, CHOLV, HDL, LDL, LDLC, DLDLP, TGLX, TRIGL, TRIGP, CHHD, CHHDX  Lab Results   Component Value Date/Time    Glucose (POC) 183 10/12/2017 11:38 AM    Glucose (POC) 191 10/12/2017 08:15 AM    Glucose (POC) 140 10/12/2017 06:52 AM    Glucose (POC) 147 10/11/2017 08:45 PM    Glucose (POC) 156 10/11/2017 05:17 PM     Lab Results   Component Value Date/Time    Color YELLOW/STRAW 10/10/2017 07:11 PM    Appearance CLEAR 10/10/2017 07:11 PM    Specific gravity 1.022 10/10/2017 07:11 PM    pH (UA) 6.5 10/10/2017 07:11 PM    Protein NEGATIVE  10/10/2017 07:11 PM    Glucose 100 10/10/2017 07:11 PM    Ketone NEGATIVE  10/10/2017 07:11 PM    Bilirubin NEGATIVE  10/10/2017 07:11 PM    Urobilinogen 0.2 10/10/2017 07:11 PM    Nitrites NEGATIVE  10/10/2017 07:11 PM    Leukocyte Esterase NEGATIVE  10/10/2017 07:11 PM    Epithelial cells FEW 10/10/2017 07:11 PM    Bacteria NEGATIVE  10/10/2017 07:11 PM    WBC 0-4 10/10/2017 07:11 PM    RBC 0-5 10/10/2017 07:11 PM         Medications Reviewed:     Current Facility-Administered Medications   Medication Dose Route Frequency    methylnaltrexone (RELISTOR) injection 12 mg  12 mg SubCUTAneous ONCE    insulin lispro (HUMALOG) injection   SubCUTAneous AC&HS    glucose chewable tablet 16 g  4 Tab Oral PRN    dextrose (D50W) injection syrg 12.5-25 g  12.5-25 g IntraVENous PRN    glucagon (GLUCAGEN) injection 1 mg  1 mg IntraMUSCular PRN    insulin glargine (LANTUS) injection 3 Units  3 Units SubCUTAneous ONCE    sodium chloride (NS) 0.9 % flush        influenza vaccine 2017-18 (3 yrs+)(PF) (FLUZONE QUAD/FLUARIX QUAD) injection 0.5 mL  0.5 mL IntraMUSCular PRIOR TO DISCHARGE    magnesium citrate solution 296 mL  296 mL Oral ONCE PRN    lactulose (CHRONULAC) solution 20 g  20 g Oral TID    lactobac ac& pc-s.therm-b.anim (AYLIN Q/RISAQUAD)  1 Cap Oral DAILY    dexamethasone (DECADRON) 4 mg/mL injection 2 mg  2 mg IntraVENous Q8H    famotidine (PEPCID) tablet 20 mg  20 mg Oral BID    hydrALAZINE (APRESOLINE) 20 mg/mL injection 10 mg  10 mg IntraVENous Q15MIN PRN    Or    labetalol (NORMODYNE;TRANDATE) injection 10 mg  10 mg IntraVENous Q15MIN PRN    ondansetron (ZOFRAN ODT) tablet 4 mg  4 mg Oral Q6H PRN    levothyroxine (SYNTHROID) tablet 75 mcg  75 mcg Oral ACB    metoprolol succinate (TOPROL-XL) XL tablet 50 mg  50 mg Oral DAILY    pravastatin (PRAVACHOL) tablet 40 mg  40 mg Oral QHS    bisacodyl (DULCOLAX) tablet 5 mg  5 mg Oral DAILY PRN    HYDROmorphone (PF) (DILAUDID) injection 0.5 mg  0.5 mg IntraVENous Q3H PRN    ALPRAZolam (XANAX) tablet 0.25 mg  0.25 mg Oral TID PRN    acetaminophen (TYLENOL) tablet 650 mg  650 mg Oral Q4H PRN    HYDROcodone-acetaminophen (NORCO) 5-325 mg per tablet 1 Tab  1 Tab Oral Q4H PRN    polyethylene glycol (MIRALAX) packet 17 g  17 g Oral DAILY PRN     ______________________________________________________________________  EXPECTED LENGTH OF STAY: 4d 16h  ACTUAL LENGTH OF STAY:          2                 Landon Shipley NP

## 2017-10-12 NOTE — PROGRESS NOTES
Physical Therapy note  10/12/17    Order acknowledged and chart reviewed. Note pt has been ambulating around unit with daughter with no concerns. Spoke with RN who confirms and reports no major gait deviations, difficulties, or LOBs. Spoke with pt who reports marked improvements in lumbar pain and with no current questions or concerns regarding mobility, declining need for therapy services - anticipating surgery next week for intradural, extramedullary tumor removal. Please reconsult post surgery.      Thank you,  Yesika Chau, PT, DPT

## 2017-10-12 NOTE — PROGRESS NOTES
[address]  [address]   [phone]                GI PROGRESS NOTE  Navin Zimmer, [facility]  Work Cell: [phone]      NAME:   Maggi Yusuf   :    1955   MRN:    [id number]     Assessment/Plan   1. Constipation - likely related to opiate use with persistent symptoms despite all medical therapies attempted thus far. - Diet as tolerated. She had several BMs after Golytely. Continue Lactulose BID as inpatient/outpatient and dose can be titrated based on BMs. Hopefully,  She can be discharged from GI standpoint tomorrow. - Need to limit narcotic use as much as feasible  2. Hyponatremia - improved  3. Intradural, extramedullary spinal mass T12-L1  4. Type 2 DM     Patient Active Problem List   Diagnosis Code    UTI (urinary tract infection) N39.0    Hypothyroidism E03.9    Hypertension I10    Hypercholesteremia E78.00    Diabetes ([address] E11.9    Back pain M54.9    Vomiting R11.10    Mass of spinal cord ([address] G95.9       Subjective:     Feeling better. Reports some right-sided abdominal pain. Per daughter, has had Miralax, stool softeners, enemas and drank almost entire 4L Golytely without any results. Passed very small amount of mucous and fecal material overnight. She denies any nausea or vomiting.        Review of Systems    Constitutional: negative fever, negative chills, negative weight loss  Eyes:   negative visual changes  ENT:   negative sore throat, tongue or lip swelling  Respiratory:  negative cough, negative dyspnea  Cards:  negative for chest pain, palpitations, lower extremity edema  GI:   See HPI  :  negative for frequency, dysuria  Integument:  negative for rash and pruritus  Heme:  negative for easy bruising and gum/nose bleeding  Musculoskel: negative for myalgias, back pain and muscle weakness  Neuro: negative for headaches, dizziness, vertigo  Psych:  negative for feelings of anxiety, depression     Objective:     VITALS: Last 24hrs VS reviewed since prior hospitalist progress note. Most recent are:  Visit Vitals    /63    Pulse 90    Temp 98 °F (36.7 °C)    Resp 16    Ht 5' 3\" (1.6 m)    Wt 47.1 kg (103 lb 13.4 oz)    SpO2 98%    BMI 18.39 kg/m2       Intake/Output Summary (Last 24 hours) at 10/12/17 1031  Last data filed at 10/12/17 0700   Gross per 24 hour   Intake             3000 ml   Output             4775 ml   Net            -1775 ml        PHYSICAL EXAM:  General   well developed, thin, alert, in no acute distress  EENT  Normocephalic, Atraumatic, PERRLA, EOMI, sclera clear  Respiratory   Clear To Auscultation bilaterally - no wheezes, rales, rhonchi, or crackles  Cardiology  Regular Rate and Rythmn  - no murmurs, rubs or gallops  Abdominal  Soft, non-distended, mild RLQ tenderness, hypoactive bowel sounds, no hepatosplenomegaly, no palpable mass  Extremities  No clubbing, cyanosis, or edema. Pulses intact. Neurological  No focal neurological deficits noted  Psychological  Oriented x 3. Normal affect.        Lab Data   Recent Results (from the past 12 hour(s))   TSH 3RD GENERATION    Collection Time: 10/12/17  3:48 AM   Result Value Ref Range    TSH 0.69 0.36 - 3.74 uIU/mL   T4, FREE    Collection Time: 10/12/17  3:48 AM   Result Value Ref Range    T4, Free 1.4 0.8 - 1.5 NG/DL   METABOLIC PANEL, BASIC    Collection Time: 10/12/17  3:48 AM   Result Value Ref Range    Sodium 133 (L) 136 - 145 mmol/L    Potassium 4.4 3.5 - 5.1 mmol/L    Chloride 97 97 - 108 mmol/L    CO2 29 21 - 32 mmol/L    Anion gap 7 5 - 15 mmol/L    Glucose 153 (H) 65 - 100 mg/dL    BUN 10 6 - 20 MG/DL    Creatinine 0.59 0.55 - 1.02 MG/DL    BUN/Creatinine ratio 17 12 - 20      GFR est AA >60 >60 ml/min/1.73m2    GFR est non-AA >60 >60 ml/min/1.73m2    Calcium 8.9 8.5 - 10.1 MG/DL   SODIUM, UR, RANDOM    Collection Time: 10/12/17  3:50 AM   Result Value Ref Range    Sodium urine, random 25 MMOL/L   OSMOLALITY, UR    Collection Time: 10/12/17  3:50 AM   Result Value Ref Range    Osmolality,urine 166 MOSM/kg H2O   GLUCOSE, POC    Collection Time: 10/12/17  6:52 AM   Result Value Ref Range    Glucose (POC) 140 (H) 65 - 100 mg/dL    Performed by [id number], POC    Collection Time: 10/12/17  8:15 AM   Result Value Ref Range    Glucose (POC) 191 (H) 65 - 100 mg/dL    Performed by Malia Chamorro          Medications: Reviewed    PMH/ reviewed - no change compared to H&P  Mid-Level Provider: Rhonda Dhillon NP   Date/Time:  10/12/2017   I have examined the patient. I have reviewed the chart and agree with the documentation recorded by the NP, including the assessment, treatment plan, and disposition.       Chere Dance, MD

## 2017-10-12 NOTE — PROGRESS NOTES
Occupational Therapy Note 1333    Orders acknowledged, chart reviewed. Patient sitting EOB with daughter present. Declining any sensory, motor, vision, or cognitive deficits. Currently c/o lumbar back pain only, pending spinal surgery clearance. Patient has ambulated around unit 5 times with daughter, no concerns. Patient with no acute OT needs at this time, is at baseline. Please re-consult post surgical intervention. Thank you.     Yves Rosales, OTR/L

## 2017-10-12 NOTE — PROGRESS NOTES
[email or web address]                  Phone - [phone]   Renal Progress Note    Subjective:   Patient: Amber Montoya                    YOB: 1955               Date- 10/12/2017          Reason for visit: hyponatremia    Admission Date: 10/10/2017     Acute Hyponatremia :  - asymptomatic   - etiology : SIADH from severe pain. No evidence of Malignancy on imaging   - TSH normal  - Na with a sharp drop but brief: down from 130 to 124 then 133 within 24 hours. Given one dose Tolvaptan yesterday. Na 133 this AM and 136 this afternoon. Expect we will not need any further intervention. Will recheck labs one more time tonight and then again in AM.  If stable, then believe that surgery or discharge will be fine.  - okay to d/c the holland but check PVR's and track I&O's.     Recent Klebsiella UTI   - UA was unremarkable      Intradural, Extramedullary Spinal mass T12-L1  - per Neuro Sx     Constipation :  - likely 2/2 Opioids. Pt feeling much better on 10/12  --she had a large bowel movement this AM.     Type II DM   HTN         D/w the patient, her daughter, and her nurses       Interval history: patient feeling much better. Does have some back pain and lower abdominal discomfort but is o/w feeling well. Daughter reports a good bowel movement this AM.  The patient's family had been pushing her to drink lots of water to treat the constipation, but the patient is now drinking \"normally\".     Review of Systems  Constitutional: negative  Ears, nose, mouth, throat, and face: negative  Respiratory: negative  Cardiovascular: negative  Gastrointestinal: positive for lower abdominal discomfort but feeling much better following a large bowel movement  Genitourinary:has a holland  Musculoskeletal:positive for back pain  Neurological: negative  skin: no rashes or lesions    Objective:     Visit Vitals    /51 (BP 1 Location: Left arm, BP Patient Position: At rest)    Pulse 84    Temp 98 °F (36.7 °C)    Resp 13  Ht 5' 3\" (1.6 m)    Wt 47.1 kg (103 lb 13.4 oz)    SpO2 98%    BMI 18.39 kg/m2     Temp (24hrs), Av.1 °F (36.7 °C), Min:98 °F (36.7 °C), Max:98.3 °F (36.8 °C)      10/12 0701 - 10/12 1900  In: 960 [P.O.:960]  Out: 250 [Urine:250]  10/10 1901 - 10/12 0700  In: 3000 [P.O.:3000]  Out: 4775 [Urine:4775]    Physical Exam:  General:  alert, cooperative, no distress  Eye:  EOMI; anicteric sclera  Neurologic:  grossly non-focal  Lungs:  clear to auscultation bilaterally  Heart:  regular rate and rhythm  Skin:  no rash or abnormalities  Musculoskeletal: grossly unremarkable  Abdomen:  Soft; some mild discomfort to palpation in lower abd; no guarding. No masses,  no organomegaly   Extremities/Edema: no edema  : holland  Psych: normal affect and mood  --cheerful    Data Review:     Recent Labs      10/12/17   1148  10/12/17   0348  10/11/17   1820  10/11/17   0337   NA  136  133*  124*  130*   K   --   4.4  HEMOLYZED,RECOLLECT REQUESTED  4.6   CL   --   97  92*  95*   CO2   --   29  21  26   BUN   --   10  11  11   CREA   --   0.59  0.70  0.54*   CA   --   8.9  8.1*  8.0*   ALB   --    --    --   3.4*   PHOS   --    --    --   3.3   MG   --    --    --   2.6*       Assessment:     Principal Problem:    Back pain (10/5/2017)    Active Problems:    Hypertension ()      Diabetes (HCC) ()      Overview: type 2      Mass of spinal cord ([address] (10/10/2017)      Chart reviewed. Pertinent Notes Reviewed. Medications list Personally Reviewed. Radha Toscano, [facility]  [facility] [address], [place] Jennifer Patterson, [address]  Phone - [phone]    Fax - [phone]  [facility]ww[facility]                                         [facility]   [address], [place] , [facility]  Phone - [phone]  Fax - [phone]. [email or web address]

## 2017-10-13 VITALS
DIASTOLIC BLOOD PRESSURE: 65 MMHG | BODY MASS INDEX: 20.18 KG/M2 | RESPIRATION RATE: 17 BRPM | WEIGHT: 113.9 LBS | HEIGHT: 63 IN | TEMPERATURE: 97.8 F | HEART RATE: 74 BPM | SYSTOLIC BLOOD PRESSURE: 129 MMHG | OXYGEN SATURATION: 97 %

## 2017-10-13 LAB
ANION GAP SERPL CALC-SCNC: 7 MMOL/L (ref 5–15)
ANION GAP SERPL CALC-SCNC: 9 MMOL/L (ref 5–15)
BUN SERPL-MCNC: 14 MG/DL (ref 6–20)
BUN SERPL-MCNC: 14 MG/DL (ref 6–20)
BUN/CREAT SERPL: 19 (ref 12–20)
BUN/CREAT SERPL: 27 (ref 12–20)
CALCIUM SERPL-MCNC: 8.4 MG/DL (ref 8.5–10.1)
CALCIUM SERPL-MCNC: 8.5 MG/DL (ref 8.5–10.1)
CHLORIDE SERPL-SCNC: 95 MMOL/L (ref 97–108)
CHLORIDE SERPL-SCNC: 98 MMOL/L (ref 97–108)
CO2 SERPL-SCNC: 26 MMOL/L (ref 21–32)
CO2 SERPL-SCNC: 28 MMOL/L (ref 21–32)
CREAT SERPL-MCNC: 0.52 MG/DL (ref 0.55–1.02)
CREAT SERPL-MCNC: 0.74 MG/DL (ref 0.55–1.02)
GLUCOSE BLD STRIP.AUTO-MCNC: 139 MG/DL (ref 65–100)
GLUCOSE BLD STRIP.AUTO-MCNC: 159 MG/DL (ref 65–100)
GLUCOSE SERPL-MCNC: 136 MG/DL (ref 65–100)
GLUCOSE SERPL-MCNC: 174 MG/DL (ref 65–100)
POTASSIUM SERPL-SCNC: 4.1 MMOL/L (ref 3.5–5.1)
POTASSIUM SERPL-SCNC: 4.2 MMOL/L (ref 3.5–5.1)
SERVICE CMNT-IMP: ABNORMAL
SERVICE CMNT-IMP: ABNORMAL
SODIUM SERPL-SCNC: 130 MMOL/L (ref 136–145)
SODIUM SERPL-SCNC: 133 MMOL/L (ref 136–145)

## 2017-10-13 PROCEDURE — 74011250637 HC RX REV CODE- 250/637: Performed by: NURSE PRACTITIONER

## 2017-10-13 PROCEDURE — 96376 TX/PRO/DX INJ SAME DRUG ADON: CPT

## 2017-10-13 PROCEDURE — 74011250637 HC RX REV CODE- 250/637: Performed by: PHYSICIAN ASSISTANT

## 2017-10-13 PROCEDURE — 36415 COLL VENOUS BLD VENIPUNCTURE: CPT | Performed by: INTERNAL MEDICINE

## 2017-10-13 PROCEDURE — 80048 BASIC METABOLIC PNL TOTAL CA: CPT | Performed by: INTERNAL MEDICINE

## 2017-10-13 PROCEDURE — 74011250637 HC RX REV CODE- 250/637: Performed by: INTERNAL MEDICINE

## 2017-10-13 PROCEDURE — 51798 US URINE CAPACITY MEASURE: CPT

## 2017-10-13 PROCEDURE — 74011250636 HC RX REV CODE- 250/636: Performed by: NURSE PRACTITIONER

## 2017-10-13 PROCEDURE — 80048 BASIC METABOLIC PNL TOTAL CA: CPT | Performed by: NURSE PRACTITIONER

## 2017-10-13 PROCEDURE — 99218 HC RM OBSERVATION: CPT

## 2017-10-13 PROCEDURE — 74011636637 HC RX REV CODE- 636/637: Performed by: NURSE PRACTITIONER

## 2017-10-13 PROCEDURE — 96375 TX/PRO/DX INJ NEW DRUG ADDON: CPT

## 2017-10-13 PROCEDURE — 82962 GLUCOSE BLOOD TEST: CPT

## 2017-10-13 RX ORDER — SODIUM CHLORIDE TAB 1 GM 1 G
2 TAB MISCELLANEOUS
Status: DISCONTINUED | OUTPATIENT
Start: 2017-10-13 | End: 2017-10-13 | Stop reason: HOSPADM

## 2017-10-13 RX ORDER — ACETAMINOPHEN 325 MG/1
650 TABLET ORAL
Qty: 30 TAB | Refills: 0 | Status: SHIPPED
Start: 2017-10-13

## 2017-10-13 RX ORDER — FUROSEMIDE 20 MG/1
TABLET ORAL
Qty: 7 TAB | Refills: 0 | Status: SHIPPED | OUTPATIENT
Start: 2017-10-13 | End: 2017-10-23

## 2017-10-13 RX ORDER — METFORMIN HYDROCHLORIDE 750 MG/1
750 TABLET, EXTENDED RELEASE ORAL
Status: DISCONTINUED | OUTPATIENT
Start: 2017-10-13 | End: 2017-10-13 | Stop reason: HOSPADM

## 2017-10-13 RX ORDER — SODIUM CHLORIDE TAB 1 GM 1 G
2 TAB MISCELLANEOUS
Qty: 36 TAB | Refills: 0 | Status: ON HOLD | OUTPATIENT
Start: 2017-10-13 | End: 2017-10-23

## 2017-10-13 RX ORDER — DEXAMETHASONE 2 MG/1
TABLET ORAL
Qty: 30 TAB | Refills: 0 | Status: ON HOLD | OUTPATIENT
Start: 2017-10-13 | End: 2017-10-23

## 2017-10-13 RX ORDER — FUROSEMIDE 20 MG/1
10 TABLET ORAL DAILY
Status: DISCONTINUED | OUTPATIENT
Start: 2017-10-13 | End: 2017-10-13 | Stop reason: HOSPADM

## 2017-10-13 RX ADMIN — FUROSEMIDE 10 MG: 20 TABLET ORAL at 13:48

## 2017-10-13 RX ADMIN — ALPRAZOLAM 0.25 MG: 0.5 TABLET ORAL at 08:44

## 2017-10-13 RX ADMIN — HYDROCODONE BITARTRATE AND ACETAMINOPHEN 1 TABLET: 5; 325 TABLET ORAL at 12:23

## 2017-10-13 RX ADMIN — LEVOTHYROXINE SODIUM 75 MCG: 150 TABLET ORAL at 06:55

## 2017-10-13 RX ADMIN — HYDROCODONE BITARTRATE AND ACETAMINOPHEN 1 TABLET: 5; 325 TABLET ORAL at 08:48

## 2017-10-13 RX ADMIN — SODIUM CHLORIDE TAB 1 GM 2 G: 1 TAB at 13:46

## 2017-10-13 RX ADMIN — LACTULOSE 20 G: 20 SOLUTION ORAL at 08:44

## 2017-10-13 RX ADMIN — METOPROLOL SUCCINATE 50 MG: 50 TABLET, EXTENDED RELEASE ORAL at 08:45

## 2017-10-13 RX ADMIN — Medication 1 CAPSULE: at 08:44

## 2017-10-13 RX ADMIN — DEXAMETHASONE SODIUM PHOSPHATE 2 MG: 4 INJECTION, SOLUTION INTRAMUSCULAR; INTRAVENOUS at 10:33

## 2017-10-13 RX ADMIN — FAMOTIDINE 20 MG: 20 TABLET ORAL at 08:45

## 2017-10-13 RX ADMIN — DEXAMETHASONE SODIUM PHOSPHATE 2 MG: 4 INJECTION, SOLUTION INTRAMUSCULAR; INTRAVENOUS at 03:00

## 2017-10-13 RX ADMIN — LACTULOSE 30 G: 20 SOLUTION ORAL at 12:23

## 2017-10-13 RX ADMIN — INSULIN LISPRO 2 UNITS: 100 INJECTION, SOLUTION INTRAVENOUS; SUBCUTANEOUS at 12:24

## 2017-10-13 NOTE — PROGRESS NOTES
Problem: Falls - Risk of  Goal: *Absence of Falls  Document Tamara Fall Risk and appropriate interventions in the flowsheet.    Outcome: Progressing Towards Goal  Fall Risk Interventions:              Medication Interventions: Bed/chair exit alarm, Patient to call before getting OOB, Teach patient to arise slowly     Elimination Interventions: Patient to call for help with toileting needs, Call light in reach

## 2017-10-13 NOTE — PROGRESS NOTES
[address]  [address] [facility] [address] [facility], [address]  [phone]                GI PROGRESS NOTE      NAME:   Faye Fernandes   :    1955   MRN:    [id number]     Assessment/Plan   1. Constipation - likely related to opiate use with persistent symptoms  -  She had several BMs after Golytely. Continue Lactulose BID as inpatient/outpatient and dose can be titrated based on BMs.   - Tolerating regular diet    2. Hyponatremia - improved  3. Intradural, extramedullary spinal mass T12-L1  4. Type 2 DM    Ok for discharge today from GI standpoint, plan for f/u with Dr. Krysta Newman in 2-4 weeks     Patient Active Problem List   Diagnosis Code    UTI (urinary tract infection) N39.0    Hypothyroidism E03.9    Hypertension I10    Hypercholesteremia E78.00    Diabetes ([address] E11.9    Back pain M54.9    Vomiting R11.10    Mass of spinal cord ([address] G95.9       Subjective:     She states that she feels well, denies abdominal pain, nausea, vomiting. She had a loose BM around 10 pm last night. She has some borborygmi but otherwise has no complaints. Review of Systems    Constitutional: negative fever, negative chills, negative weight loss  Eyes:   negative visual changes  ENT:   negative sore throat, tongue or lip swelling  Respiratory:  negative cough, negative dyspnea  Cards:  negative for chest pain, palpitations, lower extremity edema  GI:   See HPI  :  negative for frequency, dysuria  Integument:  negative for rash and pruritus  Heme:  negative for easy bruising and gum/nose bleeding  Musculoskel: negative for myalgias, back pain and muscle weakness  Neuro: negative for headaches, dizziness, vertigo  Psych:  negative for feelings of anxiety, depression     Objective:     VITALS:   Last 24hrs VS reviewed since prior hospitalist progress note.  Most recent are:  Visit Vitals    /87    Pulse 77    Temp 97.4 °F (36.3 °C)    Resp 16    Ht 5' 3\" (1.6 m)    Wt 51.7 kg (113 lb 14.4 oz)  SpO2 96%    BMI 20.18 kg/m2       Intake/Output Summary (Last 24 hours) at 10/13/17 0941  Last data filed at 10/13/17 0742   Gross per 24 hour   Intake                0 ml   Output             1150 ml   Net            -1150 ml        PHYSICAL EXAM:  General   well developed, thin, alert, in no acute distress  EENT  Normocephalic, Atraumatic, sclera clear  Respiratory   Clear To Auscultation bilaterally -   Cardiology  Regular Rate and Rythmn   Abdominal  Soft, non-distended, non-tender, active bowel sounds, no hepatosplenomegaly, no palpable mass  Extremities  No clubbing, cyanosis, or edema  Neurological  No focal neurological deficits noted  Psychological  Oriented x 3. Normal affect. Lab Data   Recent Results (from the past 12 hour(s))   METABOLIC PANEL, BASIC    Collection Time: 10/13/17  4:15 AM   Result Value Ref Range    Sodium 133 (L) 136 - 145 mmol/L    Potassium 4.1 3.5 - 5.1 mmol/L    Chloride 98 97 - 108 mmol/L    CO2 26 21 - 32 mmol/L    Anion gap 9 5 - 15 mmol/L    Glucose 136 (H) 65 - 100 mg/dL    BUN 14 6 - 20 MG/DL    Creatinine 0.52 (L) 0.55 - 1.02 MG/DL    BUN/Creatinine ratio 27 (H) 12 - 20      GFR est AA >60 >60 ml/min/1.73m2    GFR est non-AA >60 >60 ml/min/1.73m2    Calcium 8.4 (L) 8.5 - 10.1 MG/DL   GLUCOSE, POC    Collection Time: 10/13/17  6:40 AM   Result Value Ref Range    Glucose (POC) 139 (H) 65 - 100 mg/dL    Performed by VIKI FLOWER(CON)          Medications: Reviewed    Velasquez Sutton PA-C    I have examined the patient. I have reviewed the chart and agree with the documentation recorded by the PA, including the assessment, treatment plan, and disposition.       Oneida Flaherty MD

## 2017-10-13 NOTE — DISCHARGE SUMMARY
Discharge Summary     Patient ID:  Soraida Yoon  [id number]   49 y.o.  1955    Admit date: 10/10/2017    Discharge Date: 10/13/2017      Admitting Physician: Deanne Muñoz MD     Discharge Physician: Maia Huizar NP    Admission Diagnoses: Mass of spinal cord [facility])    Last Procedure: None    Discharge Diagnoses: Principal Problem:    Back pain (10/5/2017)    Active Problems:    Hypertension ()      Diabetes ([facility] [address] ()      Overview: type 2      Mass of spinal cord ([facility] [address] (10/10/2017)         Consults:   1. Nephrology  2. Gastroenterology  3. Hospitalist    Significant Diagnostic Studies:   1. MRI L-spine with and without contrast on 10/10/17 shows 0.9 x 8.3 x 7.1 mm T1 and T2 hypointense mildly enhancing, intradural extramedullary mass lesion at the T12-L1 level. Lesion is eccentric lying posteriorly into the right of the cord. Primary spinal neoplastic process versus metastatic etiology. Ependymoma and astrocytoma felt to be less likely due to the eccentric location, enhancement pattern. There is associated intradural hemorrhage which extends slightly superior to the lesion caudally to the   inferior aspect of the thecal sac. 2. CT chest/abd/pelvis with and without contrast on 10/11/17 shows 9 mm noncalcified pulmonary nodule left lower lobe. Consider PET scan to further  evaluate. This is unlikely related to the patient's spinal lesion    Patient condition upon discharge: Stable    Hospital Course: The patient presented to the ER with 10 days of severe back pain. She developed significant back pain and chills while standing for a long time at a wedding. Her back has been located around her lower abdomen and pelvis and has been accompanied with right upper thigh numbness. It has been difficult to walk due to pain. She has also had some issues with nausea and vomiting and occasional headaches. She has been to ER twice and was admitted to [facility] for treatment of a Klebsiella UTI.  She was also seen by orthopedics, diagnosed with spinal stenosis and given instructions to follow-up as an outpatient. She saw Dr. Reid Martinez in the office and had an MRI completed of her lumbar spine and was found to have an intradural, extramedullary mass at the level of T12-L1. The patient was sent to the ER for evaluation by Dr. Zulma Shore. It appears that there is intradural hemorrhage in the lumbar area as well. The patient was admitted for further work-up. Her CTA of her head and neck was negative for aneurysm or subarachnoid hemorrhage. No intracranial lesions were found. No vascular abnormalities. She underwent staging CTs to look for a source of cancer in her body. This revealed a small 9 mm calcified lesion in her lung and constipation only in the colon on the right side. GI was consulted and the patient was given bowel prep medication, which helped her to have bowel movements. Her abdominal pain improved. 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 Nephrology was consulted. She received a dose of Tolvaptan which helped to bring her sodium level up. She is ok to be discharged to home from a renal standpoint with a continued fluid restriction, salt tablets, and lab check on Monday. The patient's back pain is improved some/ She will return next week for a planned readmission for resection of her spinal mass next week. Dr. Tanja Arrington office will call on Monday with a surgery date and time and with pre-op instructions. Disposition: Home    Patient Instructions:   Current Discharge Medication List      START taking these medications    Details   acetaminophen (TYLENOL) 325 mg tablet Take 2 Tabs by mouth every four (4) hours as needed. Indications: Headache Disorder  Qty: 30 Tab, Refills: 0      furosemide (LASIX) 20 mg tablet Take 1/2 tab PO daily  Qty: 7 Tab, Refills: 0      L. acidoph & paracasei- S therm- Bifido (AYLIN-Q/RISAQUAD) 8 billion cell cap cap Take 1 Cap by mouth daily.   Qty: 7 Cap, Refills: 0      lactulose ([address]) 10 gram/15 mL solution Take 45 mL by mouth two (2) times a day for 7 days. Qty: 630 mL, Refills: 0      sodium chloride 1 gram tablet Take 2 Tabs by mouth three (3) times daily (with meals). Qty: 36 Tab, Refills: 0         CONTINUE these medications which have NOT CHANGED    Details   ALPRAZolam (XANAX) 0.25 mg tablet Take 0.125-0.25 mg by mouth two (2) times daily as needed for Anxiety (with steroid). traMADol (ULTRAM) 50 mg tablet Take 50 mg by mouth every six (6) hours as needed for Pain. HYDROcodone-acetaminophen (NORCO) 5-325 mg per tablet Take 1 Tab by mouth every four (4) hours as needed. Max Daily Amount: 6 Tabs. PRN severe pain  Qty: 10 Tab, Refills: 0      ondansetron (ZOFRAN ODT) 4 mg disintegrating tablet Take 1 Tab by mouth every six (6) hours as needed. Qty: 12 Tab, Refills: 0      metoprolol succinate (TOPROL-XL) 50 mg XL tablet Take 50 mg by mouth daily. pravastatin (PRAVACHOL) 40 mg tablet Take 40 mg by mouth nightly. levothyroxine (SYNTHROID) 75 mcg tablet Take 75 mcg by mouth Daily (before breakfast). metFORMIN ER (GLUCOPHAGE XR) 750 mg tablet Take 750 mg by mouth daily (after dinner). STOP taking these medications       ciprofloxacin HCl (CIPRO) 500 mg tablet Comments:   Reason for Stopping:         methylPREDNISolone (MEDROL, BREANNE,) 4 mg tablet Comments:   Reason for Stopping:               Diet: Reference my discharge instructions. Activity: Reference my discharge instructions. EXAM:  Gen:NAD. Neuro: A&Ox3. Follows commands. Speech clear. Affect normal.  PERRL. EOMI. Face symmetric. Palate symmetric. Tongue midline. GLYNN spontaneously. Generalized weakness. Negative drift. Gait with small steps but smoother gait  Heart RRR  Lungs CTA BL  Abd soft, non-tender. Normal bowel sounds.     Follow-up Appointments   Procedures    FOLLOW UP VISIT Appointment in: Two Weeks     Standing Status:   Standing     Number of Occurrences:   1     Order Specific Question:   Appointment in     Answer: Two Weeks        Total time discharging patient took greater than 30 minutes.     Signed:  Stephanie Goodrich NP  October 13, 2017  1:56 PM

## 2017-10-13 NOTE — DISCHARGE INSTRUCTIONS
Diet - 1000 cc of water per 24 hours. You may drink soups and juice on top of that fluid restriction (but be careful with juices because this will increase your blood sugar!)  Activity - as tolerated. No driving while on pain medication. Minimize the pain medication. If you can tolerate just taking Tylenol, that is what we recommend because pain medications are constipating. Continue the lactulose twice a day for bowel movements. If you are having too many bowel movements, then decrease the number of times you are taking the medication. The office will call you with the surgery date and pre-op instructions on Monday. They will tell you what medications to take the morning of surgery. Do not take the lactulose the morning of surgery. Dr. Suze Mayfield needs your labs checked Monday morning at his office.

## 2017-10-13 NOTE — DIABETES MGMT
DTC Progress Note    Recommendations/ Comments: Chart reviewed due to variable blood sugars. BG results 139 - 224 mg/dl and pt has required 2 units of correction over the last 24 hours. Noted correction scale has been changed to normal sensitivity and Metformin has been ordered to start this evening. If appropriate, please consider adding carb consistent to diet order. Chart reviewed and initial evaluation complete on Carson Comas. Patient is a 57 y.o. female with known DM on Metformin 750 mg dialy at supper at home. A1c:   Lab Results   Component Value Date/Time    Hemoglobin A1c 6.5 10/06/2017 03:48 AM       Recent Glucose Results:   Lab Results   Component Value Date/Time     (H) 10/13/2017 04:15 AM    GLUCPOC 159 (H) 10/13/2017 11:15 AM    GLUCPOC 139 (H) 10/13/2017 06:40 AM    GLUCPOC 224 (H) 10/12/2017 08:49 PM        Lab Results   Component Value Date/Time    Creatinine 0.52 10/13/2017 04:15 AM       Active Orders   Diet    DIET REGULAR FR 2000ML        PO intake:   Patient Vitals for the past 72 hrs:   % Diet Eaten   10/12/17 0928 15 %       Current hospital DM medication: lispro normal sensitivity correction scale and Metformin 750 mg after dinner. Thank you.   Jerad Dorantes RD, CDE

## 2017-10-13 NOTE — PROGRESS NOTES
Hospitalist Progress Note  Rodolfo Sheth NP  Answering service: [phone] from in house phone  Cell: [phone]      Date of Service:  10/13/2017  NAME:  Chris Bella  :  1955  MRN:  [id number]      Admission Summary:   57year old female with PMH of Hypertension, Diabetes, Hyperlipidemia, and chronic Back Pain who was admitted due to abnormality on MRI imaging of her lumbar spine. We are following along to manage her DM. Interval history / Subjective:   Discussed with patient and daughter at bedside. Educated on metformin and diabetic diet, asking for a video on diet, will consult the diabetes team to see if they have something like this  May restart metformin today, will continue to monitor. Assessment & Plan:     DM2  Hgb A1C 6.5  Poc, ssi  Restart Metformin  Diabetic Diet    Hypertension  Metoprolol    Hyperlipidemia  Statin    Hypothyroidism  TSH 0.69, T4 1.4  Continue Synthroid    Intradural, extramedullary Mass with Intradural Hemorrhage  Per primary team    Code status: full  DVT prophylaxis: per primary team    Care Plan discussed with: Patient/Family and Nurse  Disposition: TBD     Hospital Problems  Date Reviewed: 10/10/2017          Codes Class Noted POA    Mass of spinal cord ([address] ICD-10-CM: G95.9  ICD-9-CM: 336.9  10/10/2017 Unknown        Hypertension ICD-10-CM: I10  ICD-9-CM: 401.9  Unknown Yes        Diabetes ([address] ICD-10-CM: E11.9  ICD-9-CM: 250.00  Unknown Yes    Overview Signed 10/5/2017  9:40 PM by Mckenna Pedraza MD     type 2             * (Principal)Back pain ICD-10-CM: M54.9  ICD-9-CM: 724.5  10/5/2017 Yes                Review of Systems:   A comprehensive review of systems was negative except for that written in the HPI. Vital Signs:    Last 24hrs VS reviewed since prior progress note.  Most recent are:  Visit Vitals    /87    Pulse 77    Temp 97.4 °F (36.3 °C)    Resp 16    Ht 5' 3\" (1.6 m)    Wt 51.7 kg (113 lb 14.4 oz)    SpO2 96%    BMI 20.18 kg/m2         Intake/Output Summary (Last 24 hours) at 10/13/17 1049  Last data filed at 10/13/17 0742   Gross per 24 hour   Intake                0 ml   Output             1150 ml   Net            -1150 ml        Physical Examination:             Constitutional:  No acute distress, cooperative, pleasant    ENT:  Oral mucous moist, oropharynx benign. Neck supple,    Resp:  CTA bilaterally. No wheezing/rhonchi/rales. No accessory muscle use   CV:  Regular rhythm, normal rate, no murmurs, gallops, rubs    GI:  Soft, non distended, non tender. normoactive bowel sounds, no hepatosplenomegaly     Musculoskeletal:  No edema, warm, 2+ pulses throughout    Neurologic:  Moves all extremities. AAOx3,tongue midline, follows commands     Psych:  Good insight, Not anxious nor agitated. Skin:  Good turgor, no rashes or ulcers       Data Review:    Review and/or order of clinical lab test  Review and/or order of tests in the radiology section of [facility]      Labs:   No results for input(s): WBC, HGB, HCT, PLT, HGBEXT, HCTEXT, PLTEXT, HGBEXT, HCTEXT, PLTEXT in the last 72 hours.   Recent Labs      10/13/17   0415  10/12/17   1914  10/12/17   1148  10/12/17   0348  10/11/17   1820  10/11/17   0337   NA  133*  136  136  133*  124*  130*   K  4.1   --    --   4.4  HEMOLYZED,RECOLLECT REQUESTED  4.6   CL  98   --    --   97  92*  95*   CO2  26   --    --   29  21  26   BUN  14   --    --   10  11  11   CREA  0.52*   --    --   0.59  0.70  0.54*   GLU  136*   --    --   153*  184*  135*   CA  8.4*   --    --   8.9  8.1*  8.0*   MG   --    --    --    --    --   2.6*   PHOS   --    --    --    --    --   3.3   URICA   --    --    --    --   3.6   --      Recent Labs      10/11/17   0337   SGOT  12*   ALT  19   AP  70   TBILI  0.6   TP  6.9   ALB  3.4*   GLOB  3.5     Recent Labs      10/11/17   0337   INR  1.1   PTP  10.8      No results for input(s): FE, TIBC, PSAT, FERR in the last 72 hours. No results found for: FOL, RBCF   No results for input(s): PH, PCO2, PO2 in the last 72 hours. No results for input(s): CPK, CKNDX, TROIQ in the last 72 hours.     No lab exists for component: CPKMB  No results found for: CHOL, CHOLX, CHLST, CHOLV, HDL, LDL, LDLC, DLDLP, TGLX, TRIGL, TRIGP, CHHD, CHHDX  Lab Results   Component Value Date/Time    Glucose (POC) 139 10/13/2017 06:40 AM    Glucose (POC) 224 10/12/2017 08:49 PM    Glucose (POC) 189 10/12/2017 04:44 PM    Glucose (POC) 183 10/12/2017 11:38 AM    Glucose (POC) 191 10/12/2017 08:15 AM     Lab Results   Component Value Date/Time    Color YELLOW/STRAW 10/10/2017 07:11 PM    Appearance CLEAR 10/10/2017 07:11 PM    Specific gravity 1.022 10/10/2017 07:11 PM    pH (UA) 6.5 10/10/2017 07:11 PM    Protein NEGATIVE  10/10/2017 07:11 PM    Glucose 100 10/10/2017 07:11 PM    Ketone NEGATIVE  10/10/2017 07:11 PM    Bilirubin NEGATIVE  10/10/2017 07:11 PM    Urobilinogen 0.2 10/10/2017 07:11 PM    Nitrites NEGATIVE  10/10/2017 07:11 PM    Leukocyte Esterase NEGATIVE  10/10/2017 07:11 PM    Epithelial cells FEW 10/10/2017 07:11 PM    Bacteria NEGATIVE  10/10/2017 07:11 PM    WBC 0-4 10/10/2017 07:11 PM    RBC 0-5 10/10/2017 07:11 PM         Medications Reviewed:     Current Facility-Administered Medications   Medication Dose Route Frequency    lactulose (CHRONULAC) solution 30 g  30 g Oral BID    metFORMIN ER (GLUCOPHAGE XR) tablet 750 mg  750 mg Oral PCD    insulin lispro (HUMALOG) injection   SubCUTAneous AC&HS    glucose chewable tablet 16 g  4 Tab Oral PRN    dextrose (D50W) injection syrg 12.5-25 g  12.5-25 g IntraVENous PRN    glucagon (GLUCAGEN) injection 1 mg  1 mg IntraMUSCular PRN    hydrALAZINE (APRESOLINE) 20 mg/mL injection 10 mg  10 mg IntraVENous Q15MIN PRN    Or    labetalol (NORMODYNE;TRANDATE) injection 10 mg  10 mg IntraVENous Q15MIN PRN    influenza vaccine 2017-18 (3 yrs+)(PF) (FLUZONE QUAD/FLUARIX QUAD) injection 0.5 mL  0.5 mL IntraMUSCular PRIOR TO DISCHARGE    magnesium citrate solution 296 mL  296 mL Oral ONCE PRN    lactulose (CHRONULAC) solution 20 g  20 g Oral TID    lactobac ac& pc-s.therm-b.anim (AYLIN Q/RISAQUAD)  1 Cap Oral DAILY    dexamethasone (DECADRON) 4 mg/mL injection 2 mg  2 mg IntraVENous Q8H    famotidine (PEPCID) tablet 20 mg  20 mg Oral BID    ondansetron (ZOFRAN ODT) tablet 4 mg  4 mg Oral Q6H PRN    levothyroxine (SYNTHROID) tablet 75 mcg  75 mcg Oral ACB    metoprolol succinate (TOPROL-XL) XL tablet 50 mg  50 mg Oral DAILY    pravastatin (PRAVACHOL) tablet 40 mg  40 mg Oral QHS    bisacodyl (DULCOLAX) tablet 5 mg  5 mg Oral DAILY PRN    HYDROmorphone (PF) (DILAUDID) injection 0.5 mg  0.5 mg IntraVENous Q3H PRN    ALPRAZolam (XANAX) tablet 0.25 mg  0.25 mg Oral TID PRN    acetaminophen (TYLENOL) tablet 650 mg  650 mg Oral Q4H PRN    HYDROcodone-acetaminophen (NORCO) 5-325 mg per tablet 1 Tab  1 Tab Oral Q4H PRN    polyethylene glycol (MIRALAX) packet 17 g  17 g Oral DAILY PRN     ______________________________________________________________________  EXPECTED LENGTH OF STAY: 4d 16h  ACTUAL LENGTH OF STAY:          3                 Emmanuelle Escobedo V, NP

## 2017-10-13 NOTE — PROGRESS NOTES
0530 - pt not having much pain this evening. 1 norco given before bed. Pt having trouble sleeping but no other complaints. Bedside shift change report given to Ayanna Ware (oncoming nurse) by Nadia Espinosa (offgoing nurse). Report included the following information SBAR, Kardex, ED Summary, Intake/Output, MAR, Recent Results and Cardiac Rhythm nsr.

## 2017-10-13 NOTE — ROUTINE PROCESS
Bedside shift change report given to Estrellita Lockwood (oncoming nurse) by Paolo Knight (offgoing nurse). Report included the following information SBAR, Kardex, Accordion and Cardiac Rhythm NSR.

## 2017-10-13 NOTE — PROGRESS NOTES
Bedside RN performed patient education and medication education. Discharge concerns initiated and discussed with patient and daughter, including clarification on \"who\" assists the patient at their home and instructions for when the home going patient should call their provider after discharge. Opportunity for questions and clarification was provided. Patient receptive to education: YES  Patient stated: \"Thank you explaining\"  Barriers to Education: pt's attention span  Diagnosis Education given:  YES    Length of stay: 3  Expected Day of Discharge: 3  Ask if they have \"Help at Home\" & add to white board?   YES    Education Day #: 3    Medication Education Given:  YES  M in the box Medication name: decadron, lactulose, lasix    Pt aware of HCAHPS survey: YES

## 2017-10-13 NOTE — PROGRESS NOTES
[facility]   [address], [address], [facility]  Phone: [phone]   [phone]       Nephrology Progress Note  Erlinda Harry     1955     [id number]  Date of Admission : 10/10/2017  10/13/17    CC: Follow up for Hyponatremia      Assessment and Plan   Hyponatremia : 2/2 SIADH   Recent Klebsiella UTI   Spinal Mass   Constipation   Type II DM   Hypothyroidism     Plan :   - Ok fo d/c today   - 1000 cc / day water restriction but can drink soups and juices on top   - salt tabs 2 gm TID + lasix 10 mg daily   - High salt diet until surgery   - Pain control will help significantly   - labs on Monday in my office and will inform Neuro Sx     Discussed all the above w/ pt and daughter      Interval History:  Seen and examined   Doing ok       Review of Systems: Pertinent items are noted in HPI.     Current Medications:   Current Facility-Administered Medications   Medication Dose Route Frequency    lactulose (CHRONULAC) solution 30 g  30 g Oral BID    metFORMIN ER (GLUCOPHAGE XR) tablet 750 mg  750 mg Oral PCD    sodium chloride tablet 2 g  2 g Oral TID WITH MEALS    furosemide (LASIX) tablet 10 mg  10 mg Oral DAILY    insulin lispro (HUMALOG) injection   SubCUTAneous AC&HS    glucose chewable tablet 16 g  4 Tab Oral PRN    dextrose (D50W) injection syrg 12.5-25 g  12.5-25 g IntraVENous PRN    glucagon (GLUCAGEN) injection 1 mg  1 mg IntraMUSCular PRN    hydrALAZINE (APRESOLINE) 20 mg/mL injection 10 mg  10 mg IntraVENous Q15MIN PRN    Or    labetalol (NORMODYNE;TRANDATE) injection 10 mg  10 mg IntraVENous Q15MIN PRN    influenza vaccine 2017-18 (3 yrs+)(PF) (FLUZONE QUAD/FLUARIX QUAD) injection 0.5 mL  0.5 mL IntraMUSCular PRIOR TO DISCHARGE    magnesium citrate solution 296 mL  296 mL Oral ONCE PRN    lactulose (CHRONULAC) solution 20 g  20 g Oral TID    lactobac ac& pc-s.therm-b.anim (AYLIN Q/RISAQUAD)  1 Cap Oral DAILY    dexamethasone (DECADRON) 4 mg/mL injection 2 mg  2 mg IntraVENous Q8H    famotidine (PEPCID) tablet 20 mg  20 mg Oral BID    ondansetron (ZOFRAN ODT) tablet 4 mg  4 mg Oral Q6H PRN    levothyroxine (SYNTHROID) tablet 75 mcg  75 mcg Oral ACB    metoprolol succinate (TOPROL-XL) XL tablet 50 mg  50 mg Oral DAILY    pravastatin (PRAVACHOL) tablet 40 mg  40 mg Oral QHS    bisacodyl (DULCOLAX) tablet 5 mg  5 mg Oral DAILY PRN    HYDROmorphone (PF) (DILAUDID) injection 0.5 mg  0.5 mg IntraVENous Q3H PRN    ALPRAZolam (XANAX) tablet 0.25 mg  0.25 mg Oral TID PRN    acetaminophen (TYLENOL) tablet 650 mg  650 mg Oral Q4H PRN    HYDROcodone-acetaminophen (NORCO) 5-325 mg per tablet 1 Tab  1 Tab Oral Q4H PRN    polyethylene glycol (MIRALAX) packet 17 g  17 g Oral DAILY PRN      No Known Allergies    Objective:  Vitals:    Vitals:    10/13/17 0403 10/13/17 0657 10/13/17 0845 10/13/17 1047   BP:  143/83 146/87 144/72   Pulse:  73 77 61   Resp:  16  17   Temp:  97.4 °F (36.3 °C)  97.9 °F (36.6 °C)   SpO2:  96%     Weight: 51.7 kg (113 lb 14.4 oz)      Height:         Intake and Output:  10/13 0701 - 10/13 1900  In: 240 [P.O.:240]  Out: 900 [Urine:900]  10/11 1901 - 10/13 0700  In: 3960 [P.O.:3960]  Out: 0423 [Urine:4175]    Physical Examination:  General:  alert, cooperative, no distress  Eye:  EOMI; anicteric sclera  Neurologic:  grossly non-focal  Lungs:  clear to auscultation bilaterally  Heart:  regular rate and rhythm  Skin:  no rash or abnormalities  Musculoskeletal: grossly unremarkable  Abdomen:  Soft; some mild discomfort to palpation in lower abd; no guarding. No masses,  no organomegaly   Extremities/Edema: no edema  : holland  Psych: normal affect and mood  --cheerful       []    High complexity decision making was performed  []    Patient is at high-risk of decompensation with multiple organ involvement    Lab Data Personally Reviewed: I have reviewed all the pertinent labs, microbiology data and radiology studies during assessment.     Recent Labs 10/13/17   1045  10/13/17   0415  10/12/17   1914  10/12/17   1148  10/12/17   0348  10/11/17   1820  10/11/17   0337   NA  130*  133*  136  136  133*  124*  130*   K  4.2  4.1   --    --   4.4  HEMOLYZED,RECOLLECT REQUESTED  4.6   CL  95*  98   --    --   97  92*  95*   CO2  28  26   --    --   29  21  26   GLU  174*  136*   --    --   153*  184*  135*   BUN  14  14   --    --   10  11  11   CREA  0.74  0.52*   --    --   0.59  0.70  0.54*   CA  8.5  8.4*   --    --   8.9  8.1*  8.0*   MG   --    --    --    --    --    --   2.6*   PHOS   --    --    --    --    --    --   3.3   ALB   --    --    --    --    --    --   3.4*   SGOT   --    --    --    --    --    --   12*   ALT   --    --    --    --    --    --   19   INR   --    --    --    --    --    --   1.1     No results for input(s): WBC, HGB, HCT, PLT, HGBEXT, HCTEXT, PLTEXT in the last 72 hours.   No results found for: [facility]  Lab Results   Component Value Date/Time    Culture result: KLEBSIELLA PNEUMONIAE 10/02/2017 09:48 AM     Recent Results (from the past 24 hour(s))   GLUCOSE, POC    Collection Time: 10/12/17  4:44 PM   Result Value Ref Range    Glucose (POC) 189 (H) 65 - 100 mg/dL    Performed by Person Froylan    SODIUM    Collection Time: 10/12/17  7:14 PM   Result Value Ref Range    Sodium 136 136 - 145 mmol/L   GLUCOSE, POC    Collection Time: 10/12/17  8:49 PM   Result Value Ref Range    Glucose (POC) 224 (H) 65 - 100 mg/dL    Performed by Bernie Hash    METABOLIC PANEL, BASIC    Collection Time: 10/13/17  4:15 AM   Result Value Ref Range    Sodium 133 (L) 136 - 145 mmol/L    Potassium 4.1 3.5 - 5.1 mmol/L    Chloride 98 97 - 108 mmol/L    CO2 26 21 - 32 mmol/L    Anion gap 9 5 - 15 mmol/L    Glucose 136 (H) 65 - 100 mg/dL    BUN 14 6 - 20 MG/DL    Creatinine 0.52 (L) 0.55 - 1.02 MG/DL    BUN/Creatinine ratio 27 (H) 12 - 20      GFR est AA >60 >60 ml/min/1.73m2    GFR est non-AA >60 >60 ml/min/1.73m2    Calcium 8.4 (L) 8.5 - 10.1 MG/DL GLUCOSE, POC    Collection Time: 10/13/17  6:40 AM   Result Value Ref Range    Glucose (POC) 139 (H) 65 - 100 mg/dL    Performed by VIKI FLOWER(CON)    METABOLIC PANEL, BASIC    Collection Time: 10/13/17 10:45 AM   Result Value Ref Range    Sodium 130 (L) 136 - 145 mmol/L    Potassium 4.2 3.5 - 5.1 mmol/L    Chloride 95 (L) 97 - 108 mmol/L    CO2 28 21 - 32 mmol/L    Anion gap 7 5 - 15 mmol/L    Glucose 174 (H) 65 - 100 mg/dL    BUN 14 6 - 20 MG/DL    Creatinine 0.74 0.55 - 1.02 MG/DL    BUN/Creatinine ratio 19 12 - 20      GFR est AA >60 >60 ml/min/1.73m2    GFR est non-AA >60 >60 ml/min/1.73m2    Calcium 8.5 8.5 - 10.1 MG/DL   GLUCOSE, POC    Collection Time: 10/13/17 11:15 AM   Result Value Ref Range    Glucose (POC) 159 (H) 65 - 100 mg/dL    Performed by Vera Tinoco            Total time spent with patient:  xxx   min. Care Plan discussed with:  Patient     Family      RN      Consulting Physician [id number] [facility],         I have reviewed the flowsheets. Chart and Pertinent Notes have been reviewed. No change in PMH ,family and social history from Consult note.       Delonte Willard MD

## 2017-10-17 NOTE — PERIOP NOTES
PAT PREOP PHONE INTERVIEW COMPLETED WITH: DTR:  TANA BALDERAS. PATIENT ADVISED NOT TO EAT/DRINK ANYTHING PAST MIDNIGHT EVENING PRIOR TO SURGERY,  NOTHING TO EAT/DRINK MORNING OF SURGERY UNLESS SIP OF WATER TO SWALLOW MEDICATION;  LEAVE ALL VALUABLES AT HOME; DO BRING PICTURE ID, INSURANCE CARD AND ANY COPAY; WEAR COMFORTABLE CLOTHING;  NO PERFUMES, POWDERS, LOTIONS; NO ALCOHOL 24 HOURS BEFORE OR AFTER SURGERY;  WILL NEED TO BE DRIVEN HOME BY FAMILY OR FRIEND;  AVOID TAKING NSAIDS, ASPIRIN, FISH OIL, VITAMIN E OR GLUCOSAMINE/CHONDROITIN DURING THIS TIME PRIOR TO SURGERY;  MAY TAKE TYLENOL. INSTRUCTED TO REPORT  [address] BY SURGEON'S OFFICE.

## 2017-10-18 ENCOUNTER — ANESTHESIA EVENT (OUTPATIENT)
Dept: SURGERY | Age: 62
DRG: 029 | End: 2017-10-18
Payer: COMMERCIAL

## 2017-10-19 ENCOUNTER — ANESTHESIA (OUTPATIENT)
Dept: SURGERY | Age: 62
DRG: 029 | End: 2017-10-19
Payer: COMMERCIAL

## 2017-10-19 ENCOUNTER — APPOINTMENT (OUTPATIENT)
Dept: GENERAL RADIOLOGY | Age: 62
DRG: 029 | End: 2017-10-19
Attending: NEUROLOGICAL SURGERY
Payer: COMMERCIAL

## 2017-10-19 ENCOUNTER — HOSPITAL ENCOUNTER (INPATIENT)
Age: 62
LOS: 4 days | Discharge: HOME | DRG: 029 | End: 2017-10-23
Attending: NEUROLOGICAL SURGERY | Admitting: NEUROLOGICAL SURGERY
Payer: COMMERCIAL

## 2017-10-19 PROBLEM — D49.7 INTRADURAL EXTRAMEDULLARY SPINAL TUMOR: Status: ACTIVE | Noted: 2017-10-19

## 2017-10-19 LAB
GLUCOSE BLD STRIP.AUTO-MCNC: 136 MG/DL (ref 65–100)
GLUCOSE BLD STRIP.AUTO-MCNC: 98 MG/DL (ref 65–100)
SERVICE CMNT-IMP: ABNORMAL
SERVICE CMNT-IMP: NORMAL

## 2017-10-19 PROCEDURE — 77030014647 HC SEAL FBRN TISSL BAXT -D: Performed by: NEUROLOGICAL SURGERY

## 2017-10-19 PROCEDURE — 77030010813: Performed by: NEUROLOGICAL SURGERY

## 2017-10-19 PROCEDURE — 00BX0ZZ EXCISION OF THORACIC SPINAL CORD, OPEN APPROACH: ICD-10-PCS | Performed by: NEUROLOGICAL SURGERY

## 2017-10-19 PROCEDURE — 76001 XR FLUOROSCOPY OVER 60 MINUTES: CPT

## 2017-10-19 PROCEDURE — 88341 IMHCHEM/IMCYTCHM EA ADD ANTB: CPT | Performed by: NEUROLOGICAL SURGERY

## 2017-10-19 PROCEDURE — 74011250636 HC RX REV CODE- 250/636

## 2017-10-19 PROCEDURE — 74011000250 HC RX REV CODE- 250

## 2017-10-19 PROCEDURE — 88342 IMHCHEM/IMCYTCHM 1ST ANTB: CPT | Performed by: NEUROLOGICAL SURGERY

## 2017-10-19 PROCEDURE — 82962 GLUCOSE BLOOD TEST: CPT

## 2017-10-19 PROCEDURE — 77030004391 HC BUR FLUT MEDT -C: Performed by: NEUROLOGICAL SURGERY

## 2017-10-19 PROCEDURE — 76010000172 HC OR TIME 2.5 TO 3 HR INTENSV-TIER 1: Performed by: NEUROLOGICAL SURGERY

## 2017-10-19 PROCEDURE — 77030003029 HC SUT VCRL J&J -B: Performed by: NEUROLOGICAL SURGERY

## 2017-10-19 PROCEDURE — 77030013951 HC SEAL TISS ADH DURASL KT INLC -G1: Performed by: NEUROLOGICAL SURGERY

## 2017-10-19 PROCEDURE — 77030002946 HC SUT NRLN J&J -B: Performed by: NEUROLOGICAL SURGERY

## 2017-10-19 PROCEDURE — 77030032490 HC SLV COMPR SCD KNE COVD -B: Performed by: NEUROLOGICAL SURGERY

## 2017-10-19 PROCEDURE — 77030002933 HC SUT MCRYL J&J -A: Performed by: NEUROLOGICAL SURGERY

## 2017-10-19 PROCEDURE — 77030029099 HC BN WAX SSPC -A: Performed by: NEUROLOGICAL SURGERY

## 2017-10-19 PROCEDURE — 74011250636 HC RX REV CODE- 250/636: Performed by: NEUROLOGICAL SURGERY

## 2017-10-19 PROCEDURE — 77030012035 HC APPL SEAL MICROMYST KT INLC -C: Performed by: NEUROLOGICAL SURGERY

## 2017-10-19 PROCEDURE — 77030019908 HC STETH ESOPH SIMS -A: Performed by: ANESTHESIOLOGY

## 2017-10-19 PROCEDURE — 77030034850: Performed by: NEUROLOGICAL SURGERY

## 2017-10-19 PROCEDURE — 00DT0ZZ EXTRACTION OF SPINAL MENINGES, OPEN APPROACH: ICD-10-PCS | Performed by: NEUROLOGICAL SURGERY

## 2017-10-19 PROCEDURE — 74011000250 HC RX REV CODE- 250: Performed by: NEUROLOGICAL SURGERY

## 2017-10-19 PROCEDURE — 77030013079 HC BLNKT BAIR HGGR 3M -A: Performed by: ANESTHESIOLOGY

## 2017-10-19 PROCEDURE — 74011000272 HC RX REV CODE- 272: Performed by: NEUROLOGICAL SURGERY

## 2017-10-19 PROCEDURE — 76060000036 HC ANESTHESIA 2.5 TO 3 HR: Performed by: NEUROLOGICAL SURGERY

## 2017-10-19 PROCEDURE — 74011250637 HC RX REV CODE- 250/637: Performed by: NEUROLOGICAL SURGERY

## 2017-10-19 PROCEDURE — 77030012890

## 2017-10-19 PROCEDURE — 88305 TISSUE EXAM BY PATHOLOGIST: CPT | Performed by: NEUROLOGICAL SURGERY

## 2017-10-19 PROCEDURE — 77030012602 HC SPNG PTTY NEUR J&J -B: Performed by: NEUROLOGICAL SURGERY

## 2017-10-19 PROCEDURE — 93005 ELECTROCARDIOGRAM TRACING: CPT

## 2017-10-19 PROCEDURE — 77030002924 HC SUT GORTX WLGO -B: Performed by: NEUROLOGICAL SURGERY

## 2017-10-19 PROCEDURE — 77030020782 HC GWN BAIR PAWS FLX 3M -B

## 2017-10-19 PROCEDURE — 74011250636 HC RX REV CODE- 250/636: Performed by: ANESTHESIOLOGY

## 2017-10-19 PROCEDURE — 76210000006 HC OR PH I REC 0.5 TO 1 HR: Performed by: NEUROLOGICAL SURGERY

## 2017-10-19 PROCEDURE — 77030026438 HC STYL ET INTUB CARD -A: Performed by: ANESTHESIOLOGY

## 2017-10-19 PROCEDURE — 65660000000 HC RM CCU STEPDOWN

## 2017-10-19 PROCEDURE — 77030008684 HC TU ET CUF COVD -B: Performed by: ANESTHESIOLOGY

## 2017-10-19 RX ORDER — AMOXICILLIN 250 MG
1 CAPSULE ORAL DAILY
Status: DISCONTINUED | OUTPATIENT
Start: 2017-10-20 | End: 2017-10-23 | Stop reason: HOSPADM

## 2017-10-19 RX ORDER — FENTANYL CITRATE 50 UG/ML
50 INJECTION, SOLUTION INTRAMUSCULAR; INTRAVENOUS AS NEEDED
Status: DISCONTINUED | OUTPATIENT
Start: 2017-10-19 | End: 2017-10-19 | Stop reason: HOSPADM

## 2017-10-19 RX ORDER — HYDROMORPHONE HYDROCHLORIDE 1 MG/ML
0.2 INJECTION, SOLUTION INTRAMUSCULAR; INTRAVENOUS; SUBCUTANEOUS
Status: DISCONTINUED | OUTPATIENT
Start: 2017-10-19 | End: 2017-10-19 | Stop reason: HOSPADM

## 2017-10-19 RX ORDER — SODIUM CHLORIDE 0.9 % (FLUSH) 0.9 %
5-10 SYRINGE (ML) INJECTION AS NEEDED
Status: DISCONTINUED | OUTPATIENT
Start: 2017-10-19 | End: 2017-10-19 | Stop reason: HOSPADM

## 2017-10-19 RX ORDER — PROPOFOL 10 MG/ML
INJECTION, EMULSION INTRAVENOUS
Status: DISCONTINUED | OUTPATIENT
Start: 2017-10-19 | End: 2017-10-19 | Stop reason: HOSPADM

## 2017-10-19 RX ORDER — PRAVASTATIN SODIUM 40 MG/1
40 TABLET ORAL
Status: DISCONTINUED | OUTPATIENT
Start: 2017-10-19 | End: 2017-10-23 | Stop reason: HOSPADM

## 2017-10-19 RX ORDER — HYDROMORPHONE HYDROCHLORIDE 1 MG/ML
1 INJECTION, SOLUTION INTRAMUSCULAR; INTRAVENOUS; SUBCUTANEOUS
Status: ACTIVE | OUTPATIENT
Start: 2017-10-19 | End: 2017-10-20

## 2017-10-19 RX ORDER — FACIAL-BODY WIPES
10 EACH TOPICAL DAILY PRN
Status: DISCONTINUED | OUTPATIENT
Start: 2017-10-19 | End: 2017-10-22 | Stop reason: SDUPTHER

## 2017-10-19 RX ORDER — DEXAMETHASONE SODIUM PHOSPHATE 4 MG/ML
4 INJECTION, SOLUTION INTRA-ARTICULAR; INTRALESIONAL; INTRAMUSCULAR; INTRAVENOUS; SOFT TISSUE EVERY 8 HOURS
Status: DISCONTINUED | OUTPATIENT
Start: 2017-10-20 | End: 2017-10-20 | Stop reason: CLARIF

## 2017-10-19 RX ORDER — FENTANYL CITRATE 50 UG/ML
25 INJECTION, SOLUTION INTRAMUSCULAR; INTRAVENOUS
Status: DISCONTINUED | OUTPATIENT
Start: 2017-10-19 | End: 2017-10-19 | Stop reason: HOSPADM

## 2017-10-19 RX ORDER — ONDANSETRON 2 MG/ML
INJECTION INTRAMUSCULAR; INTRAVENOUS AS NEEDED
Status: DISCONTINUED | OUTPATIENT
Start: 2017-10-19 | End: 2017-10-19 | Stop reason: HOSPADM

## 2017-10-19 RX ORDER — SODIUM CHLORIDE 0.9 % (FLUSH) 0.9 %
5-10 SYRINGE (ML) INJECTION EVERY 8 HOURS
Status: DISCONTINUED | OUTPATIENT
Start: 2017-10-20 | End: 2017-10-23 | Stop reason: HOSPADM

## 2017-10-19 RX ORDER — SODIUM CHLORIDE, SODIUM LACTATE, POTASSIUM CHLORIDE, CALCIUM CHLORIDE 600; 310; 30; 20 MG/100ML; MG/100ML; MG/100ML; MG/100ML
100 INJECTION, SOLUTION INTRAVENOUS CONTINUOUS
Status: DISCONTINUED | OUTPATIENT
Start: 2017-10-19 | End: 2017-10-19 | Stop reason: HOSPADM

## 2017-10-19 RX ORDER — LIDOCAINE HYDROCHLORIDE ANHYDROUS AND DEXTROSE MONOHYDRATE .8; 5 G/100ML; G/100ML
INJECTION, SOLUTION INTRAVENOUS
Status: DISCONTINUED | OUTPATIENT
Start: 2017-10-19 | End: 2017-10-19 | Stop reason: HOSPADM

## 2017-10-19 RX ORDER — MIDAZOLAM HYDROCHLORIDE 1 MG/ML
INJECTION, SOLUTION INTRAMUSCULAR; INTRAVENOUS AS NEEDED
Status: DISCONTINUED | OUTPATIENT
Start: 2017-10-19 | End: 2017-10-19 | Stop reason: HOSPADM

## 2017-10-19 RX ORDER — HYDROMORPHONE HYDROCHLORIDE 4 MG/1
4 TABLET ORAL
Status: DISCONTINUED | OUTPATIENT
Start: 2017-10-19 | End: 2017-10-20

## 2017-10-19 RX ORDER — NALOXONE HYDROCHLORIDE 0.4 MG/ML
0.4 INJECTION, SOLUTION INTRAMUSCULAR; INTRAVENOUS; SUBCUTANEOUS AS NEEDED
Status: DISCONTINUED | OUTPATIENT
Start: 2017-10-19 | End: 2017-10-23 | Stop reason: HOSPADM

## 2017-10-19 RX ORDER — MIDAZOLAM HYDROCHLORIDE 1 MG/ML
1 INJECTION, SOLUTION INTRAMUSCULAR; INTRAVENOUS AS NEEDED
Status: DISCONTINUED | OUTPATIENT
Start: 2017-10-19 | End: 2017-10-19 | Stop reason: HOSPADM

## 2017-10-19 RX ORDER — HYDROMORPHONE HYDROCHLORIDE 2 MG/1
2 TABLET ORAL
Status: DISCONTINUED | OUTPATIENT
Start: 2017-10-19 | End: 2017-10-20

## 2017-10-19 RX ORDER — LIDOCAINE HYDROCHLORIDE 10 MG/ML
0.1 INJECTION, SOLUTION EPIDURAL; INFILTRATION; INTRACAUDAL; PERINEURAL AS NEEDED
Status: DISCONTINUED | OUTPATIENT
Start: 2017-10-19 | End: 2017-10-19 | Stop reason: HOSPADM

## 2017-10-19 RX ORDER — DEXMEDETOMIDINE HYDROCHLORIDE 4 UG/ML
INJECTION, SOLUTION INTRAVENOUS AS NEEDED
Status: DISCONTINUED | OUTPATIENT
Start: 2017-10-19 | End: 2017-10-19 | Stop reason: HOSPADM

## 2017-10-19 RX ORDER — FENTANYL CITRATE 50 UG/ML
INJECTION, SOLUTION INTRAMUSCULAR; INTRAVENOUS AS NEEDED
Status: DISCONTINUED | OUTPATIENT
Start: 2017-10-19 | End: 2017-10-19 | Stop reason: HOSPADM

## 2017-10-19 RX ORDER — SODIUM CHLORIDE TAB 1 GM 1 G
2 TAB MISCELLANEOUS
Status: DISCONTINUED | OUTPATIENT
Start: 2017-10-20 | End: 2017-10-20 | Stop reason: CLARIF

## 2017-10-19 RX ORDER — DEXTROSE 50 % IN WATER (D50W) INTRAVENOUS SYRINGE
12.5-25 AS NEEDED
Status: DISCONTINUED | OUTPATIENT
Start: 2017-10-19 | End: 2017-10-23 | Stop reason: HOSPADM

## 2017-10-19 RX ORDER — SODIUM CHLORIDE 0.9 % (FLUSH) 0.9 %
5-10 SYRINGE (ML) INJECTION AS NEEDED
Status: DISCONTINUED | OUTPATIENT
Start: 2017-10-19 | End: 2017-10-23 | Stop reason: HOSPADM

## 2017-10-19 RX ORDER — SUCCINYLCHOLINE CHLORIDE 20 MG/ML
INJECTION INTRAMUSCULAR; INTRAVENOUS AS NEEDED
Status: DISCONTINUED | OUTPATIENT
Start: 2017-10-19 | End: 2017-10-19 | Stop reason: HOSPADM

## 2017-10-19 RX ORDER — GLYCOPYRROLATE 0.2 MG/ML
INJECTION INTRAMUSCULAR; INTRAVENOUS AS NEEDED
Status: DISCONTINUED | OUTPATIENT
Start: 2017-10-19 | End: 2017-10-19 | Stop reason: HOSPADM

## 2017-10-19 RX ORDER — LEVOTHYROXINE SODIUM 75 UG/1
75 TABLET ORAL
Status: DISCONTINUED | OUTPATIENT
Start: 2017-10-20 | End: 2017-10-23 | Stop reason: HOSPADM

## 2017-10-19 RX ORDER — DIPHENHYDRAMINE HYDROCHLORIDE 50 MG/ML
12.5 INJECTION, SOLUTION INTRAMUSCULAR; INTRAVENOUS AS NEEDED
Status: DISCONTINUED | OUTPATIENT
Start: 2017-10-19 | End: 2017-10-19 | Stop reason: HOSPADM

## 2017-10-19 RX ORDER — MORPHINE SULFATE 10 MG/ML
2 INJECTION, SOLUTION INTRAMUSCULAR; INTRAVENOUS
Status: DISCONTINUED | OUTPATIENT
Start: 2017-10-19 | End: 2017-10-19 | Stop reason: HOSPADM

## 2017-10-19 RX ORDER — ONDANSETRON 2 MG/ML
4 INJECTION INTRAMUSCULAR; INTRAVENOUS
Status: ACTIVE | OUTPATIENT
Start: 2017-10-19 | End: 2017-10-20

## 2017-10-19 RX ORDER — SODIUM CHLORIDE 9 MG/ML
75 INJECTION, SOLUTION INTRAVENOUS CONTINUOUS
Status: DISCONTINUED | OUTPATIENT
Start: 2017-10-19 | End: 2017-10-20

## 2017-10-19 RX ORDER — PROPOFOL 10 MG/ML
INJECTION, EMULSION INTRAVENOUS AS NEEDED
Status: DISCONTINUED | OUTPATIENT
Start: 2017-10-19 | End: 2017-10-19 | Stop reason: HOSPADM

## 2017-10-19 RX ORDER — ROCURONIUM BROMIDE 10 MG/ML
INJECTION, SOLUTION INTRAVENOUS AS NEEDED
Status: DISCONTINUED | OUTPATIENT
Start: 2017-10-19 | End: 2017-10-19 | Stop reason: HOSPADM

## 2017-10-19 RX ORDER — MAGNESIUM SULFATE 100 %
4 CRYSTALS MISCELLANEOUS AS NEEDED
Status: DISCONTINUED | OUTPATIENT
Start: 2017-10-19 | End: 2017-10-23 | Stop reason: HOSPADM

## 2017-10-19 RX ORDER — NEOSTIGMINE METHYLSULFATE 1 MG/ML
INJECTION INTRAVENOUS AS NEEDED
Status: DISCONTINUED | OUTPATIENT
Start: 2017-10-19 | End: 2017-10-19 | Stop reason: HOSPADM

## 2017-10-19 RX ORDER — HYDROCODONE BITARTRATE AND ACETAMINOPHEN 5; 325 MG/1; MG/1
1 TABLET ORAL
Status: DISCONTINUED | OUTPATIENT
Start: 2017-10-19 | End: 2017-10-19 | Stop reason: SDUPTHER

## 2017-10-19 RX ORDER — DEXAMETHASONE SODIUM PHOSPHATE 4 MG/ML
INJECTION, SOLUTION INTRA-ARTICULAR; INTRALESIONAL; INTRAMUSCULAR; INTRAVENOUS; SOFT TISSUE AS NEEDED
Status: DISCONTINUED | OUTPATIENT
Start: 2017-10-19 | End: 2017-10-19 | Stop reason: HOSPADM

## 2017-10-19 RX ORDER — METOPROLOL SUCCINATE 50 MG/1
50 TABLET, EXTENDED RELEASE ORAL
Status: DISCONTINUED | OUTPATIENT
Start: 2017-10-19 | End: 2017-10-23 | Stop reason: HOSPADM

## 2017-10-19 RX ORDER — SODIUM CHLORIDE 9 MG/ML
INJECTION, SOLUTION INTRAVENOUS
Status: DISCONTINUED | OUTPATIENT
Start: 2017-10-19 | End: 2017-10-19 | Stop reason: HOSPADM

## 2017-10-19 RX ORDER — SODIUM CHLORIDE 0.9 % (FLUSH) 0.9 %
5-10 SYRINGE (ML) INJECTION EVERY 8 HOURS
Status: DISCONTINUED | OUTPATIENT
Start: 2017-10-19 | End: 2017-10-19 | Stop reason: HOSPADM

## 2017-10-19 RX ORDER — FUROSEMIDE 20 MG/1
20 TABLET ORAL DAILY
Status: DISCONTINUED | OUTPATIENT
Start: 2017-10-20 | End: 2017-10-20

## 2017-10-19 RX ORDER — ROPIVACAINE HYDROCHLORIDE 5 MG/ML
150 INJECTION, SOLUTION EPIDURAL; INFILTRATION; PERINEURAL AS NEEDED
Status: DISCONTINUED | OUTPATIENT
Start: 2017-10-19 | End: 2017-10-19 | Stop reason: HOSPADM

## 2017-10-19 RX ORDER — ALPRAZOLAM 0.25 MG/1
.125-.25 TABLET ORAL
Status: DISCONTINUED | OUTPATIENT
Start: 2017-10-19 | End: 2017-10-22

## 2017-10-19 RX ORDER — LIDOCAINE HYDROCHLORIDE 20 MG/ML
INJECTION, SOLUTION EPIDURAL; INFILTRATION; INTRACAUDAL; PERINEURAL AS NEEDED
Status: DISCONTINUED | OUTPATIENT
Start: 2017-10-19 | End: 2017-10-19 | Stop reason: HOSPADM

## 2017-10-19 RX ORDER — PANTOPRAZOLE SODIUM 40 MG/1
40 TABLET, DELAYED RELEASE ORAL
Status: DISCONTINUED | OUTPATIENT
Start: 2017-10-20 | End: 2017-10-23 | Stop reason: HOSPADM

## 2017-10-19 RX ORDER — MIDAZOLAM HYDROCHLORIDE 1 MG/ML
0.5 INJECTION, SOLUTION INTRAMUSCULAR; INTRAVENOUS
Status: DISCONTINUED | OUTPATIENT
Start: 2017-10-19 | End: 2017-10-19 | Stop reason: HOSPADM

## 2017-10-19 RX ORDER — DIPHENHYDRAMINE HCL 25 MG
25 CAPSULE ORAL
Status: DISCONTINUED | OUTPATIENT
Start: 2017-10-19 | End: 2017-10-23 | Stop reason: HOSPADM

## 2017-10-19 RX ORDER — ACETAMINOPHEN 325 MG/1
650 TABLET ORAL
Status: DISCONTINUED | OUTPATIENT
Start: 2017-10-19 | End: 2017-10-23 | Stop reason: HOSPADM

## 2017-10-19 RX ORDER — CEFAZOLIN SODIUM IN 0.9 % NACL 2 G/50 ML
2 INTRAVENOUS SOLUTION, PIGGYBACK (ML) INTRAVENOUS
Status: COMPLETED | OUTPATIENT
Start: 2017-10-19 | End: 2017-10-19

## 2017-10-19 RX ADMIN — SUCCINYLCHOLINE CHLORIDE 100 MG: 20 INJECTION INTRAMUSCULAR; INTRAVENOUS at 16:49

## 2017-10-19 RX ADMIN — FENTANYL CITRATE 150 MCG: 50 INJECTION, SOLUTION INTRAMUSCULAR; INTRAVENOUS at 16:48

## 2017-10-19 RX ADMIN — ONDANSETRON 4 MG: 2 INJECTION INTRAMUSCULAR; INTRAVENOUS at 19:06

## 2017-10-19 RX ADMIN — MIDAZOLAM HYDROCHLORIDE 1 MG: 1 INJECTION, SOLUTION INTRAMUSCULAR; INTRAVENOUS at 16:48

## 2017-10-19 RX ADMIN — PROPOFOL 50 MG: 10 INJECTION, EMULSION INTRAVENOUS at 18:23

## 2017-10-19 RX ADMIN — MIDAZOLAM HYDROCHLORIDE 1 MG: 1 INJECTION, SOLUTION INTRAMUSCULAR; INTRAVENOUS at 16:40

## 2017-10-19 RX ADMIN — DEXMEDETOMIDINE HYDROCHLORIDE 15 MCG: 4 INJECTION, SOLUTION INTRAVENOUS at 18:22

## 2017-10-19 RX ADMIN — NEOSTIGMINE METHYLSULFATE 2.5 MG: 1 INJECTION INTRAVENOUS at 19:20

## 2017-10-19 RX ADMIN — HYDROMORPHONE HYDROCHLORIDE 2 MG: 2 TABLET ORAL at 22:14

## 2017-10-19 RX ADMIN — PROPOFOL 100 MG: 10 INJECTION, EMULSION INTRAVENOUS at 16:48

## 2017-10-19 RX ADMIN — PRAVASTATIN SODIUM 40 MG: 40 TABLET ORAL at 22:14

## 2017-10-19 RX ADMIN — CEFAZOLIN 2 G: 1 INJECTION, POWDER, FOR SOLUTION INTRAMUSCULAR; INTRAVENOUS; PARENTERAL at 17:04

## 2017-10-19 RX ADMIN — MIDAZOLAM HYDROCHLORIDE 2 MG: 1 INJECTION, SOLUTION INTRAMUSCULAR; INTRAVENOUS at 16:37

## 2017-10-19 RX ADMIN — LIDOCAINE HYDROCHLORIDE 60 MG: 20 INJECTION, SOLUTION EPIDURAL; INFILTRATION; INTRACAUDAL; PERINEURAL at 16:48

## 2017-10-19 RX ADMIN — SODIUM CHLORIDE, SODIUM LACTATE, POTASSIUM CHLORIDE, AND CALCIUM CHLORIDE 100 ML/HR: 600; 310; 30; 20 INJECTION, SOLUTION INTRAVENOUS at 14:05

## 2017-10-19 RX ADMIN — ROCURONIUM BROMIDE 5 MG: 10 INJECTION, SOLUTION INTRAVENOUS at 16:48

## 2017-10-19 RX ADMIN — PROPOFOL 150 MCG/KG/MIN: 10 INJECTION, EMULSION INTRAVENOUS at 16:50

## 2017-10-19 RX ADMIN — GLYCOPYRROLATE 0.4 MG: 0.2 INJECTION INTRAMUSCULAR; INTRAVENOUS at 19:20

## 2017-10-19 RX ADMIN — DEXAMETHASONE SODIUM PHOSPHATE 10 MG: 4 INJECTION, SOLUTION INTRA-ARTICULAR; INTRALESIONAL; INTRAMUSCULAR; INTRAVENOUS; SOFT TISSUE at 17:09

## 2017-10-19 RX ADMIN — FENTANYL CITRATE 50 MCG: 50 INJECTION, SOLUTION INTRAMUSCULAR; INTRAVENOUS at 16:54

## 2017-10-19 RX ADMIN — SODIUM CHLORIDE 125 ML/HR: 900 INJECTION, SOLUTION INTRAVENOUS at 19:50

## 2017-10-19 RX ADMIN — SODIUM CHLORIDE: 9 INJECTION, SOLUTION INTRAVENOUS at 18:54

## 2017-10-19 RX ADMIN — FENTANYL CITRATE 50 MCG: 50 INJECTION, SOLUTION INTRAMUSCULAR; INTRAVENOUS at 17:15

## 2017-10-19 RX ADMIN — LIDOCAINE HYDROCHLORIDE ANHYDROUS AND DEXTROSE MONOHYDRATE 1.5 MG/KG/HR: .8; 5 INJECTION, SOLUTION INTRAVENOUS at 16:57

## 2017-10-19 RX ADMIN — ROCURONIUM BROMIDE 20 MG: 10 INJECTION, SOLUTION INTRAVENOUS at 17:12

## 2017-10-19 NOTE — IP AVS SNAPSHOT
[address] [place] [address] 
[phone] Patient: Kendrick Ahn MRN: [id number] UYA:2/14/2163 You are allergic to the following No active allergies Recent Documentation Height Weight BMI OB Status Smoking Status 1.6 m 51.2 kg 19.98 kg/m2 Postmenopausal Never Smoker Emergency Contacts Name Discharge Info Relation Home Work Mobile [address] CAREGIVER [3] Daughter [21] [phone] About your hospitalization You were admitted on:  October 19, 2017 You last received care in the:  [facility] 6S NEURO-SCI TELE You were discharged on:  October 23, 2017 Unit phone number:  [phone] Why you were hospitalized Your primary diagnosis was:  Not on File Your diagnoses also included:  Intradural Extramedullary Spinal Tumor Providers Seen During Your Hospitalizations Provider Role Specialty Primary office phone Ranulfo Jaquez MD Attending Provider Neurosurgery [phone] Your Primary Care Physician (PCP) Primary Care Physician Office Phone Office Fax Shakila Bravo [phone] Follow-up Information Follow up With Details Comments Contact Info Ranulfo Jaquez MD Schedule an appointment as soon as possible for a visit in 1 week For wound re-check [place] [address] [facility] [address] [phone] Camille Ventura MD Schedule an appointment as soon as possible for a visit in 1 week hospital follow-up and blood sugar check Shahram Fabio [address]ft 83 1500 [facility] [address] [place] [address] 
[phone] Daniel Argueta MD In 1 week follow up with nephrology or a pcp to recheck your sodium level [place] [facility] [address] [address] [phone] AT HOME CARE  SN and OT eval and treat. [address] [place] [address] 
[phone] Marc Doll MD  Sodium labs on Friday [facility] [address] 
[phone] Current Discharge Medication List  
  
CONTINUE these medications which have CHANGED Dose & Instructions Dispensing Information Comments Morning Noon Evening Bedtime  
 dexamethasone 2 mg tablet Commonly known as:  DECADRON What changed:  additional instructions Your last dose was: Your next dose is: Take 1 tab PO twice a day x 2 days then 1 tab PO daily x 2 days then stop Quantity:  6 Tab Refills:  0  
     
   
   
   
  
 metFORMIN  mg tablet Commonly known as:  GLUCOPHAGE XR What changed:  when to take this Your last dose was: Your next dose is:    
   
   
 Dose:  750 mg Take 1 Tab by mouth two (2) times a day. Quantity:  60 Tab Refills:  0  
     
   
   
   
  
 sodium chloride 1 gram tablet What changed:  how much to take Your last dose was: Your next dose is:    
   
   
 Dose:  1 g Take 1 Tab by mouth three (3) times daily (with meals). Quantity:  20 Tab Refills:  0 CONTINUE these medications which have NOT CHANGED Dose & Instructions Dispensing Information Comments Morning Noon Evening Bedtime  
 acetaminophen 325 mg tablet Commonly known as:  TYLENOL Your last dose was: Your next dose is:    
   
   
 Dose:  650 mg Take 2 Tabs by mouth every four (4) hours as needed. Indications: Headache Disorder Quantity:  30 Tab Refills:  0 ALPRAZolam 0.25 mg tablet Commonly known as:  Dheeraj Sheldon Your last dose was: Your next dose is:    
   
   
 Dose:  0.125-0.25 mg Take 0.125-0.25 mg by mouth two (2) times daily as needed for Anxiety (with steroid). Refills:  0 HYDROcodone-acetaminophen 5-325 mg per tablet Commonly known as:  Kayley Fowler Your last dose was: Your next dose is:    
   
   
 Dose:  1 Tab Take 1 Tab by mouth every four (4) hours as needed. Max Daily Amount: 6 Tabs. PRN severe pain Quantity:  20 Tab Refills:  0  
     
   
   
   
  
 L. acidoph & paracasei- S therm- Bifido 8 billion cell Cap cap Commonly known as:  AYLIN-Q/RISAQUAD Your last dose was: Your next dose is:    
   
   
 Dose:  1 Cap Take 1 Cap by mouth daily. Quantity:  7 Cap Refills:  0  
     
   
   
   
  
 lactulose 10 gram/15 mL solution Commonly known as:  Thiago Noel Your last dose was: Your next dose is:    
   
   
 Dose:  30 g Take 45 mL by mouth two (2) times a day for 5 days. Quantity:  450 mL Refills:  0  
     
   
   
   
  
 levothyroxine 75 mcg tablet Commonly known as:  SYNTHROID Your last dose was: Your next dose is:    
   
   
 Dose:  75 mcg Take 75 mcg by mouth Daily (before breakfast). Refills:  0  
     
   
   
   
  
 metoprolol succinate 50 mg XL tablet Commonly known as:  TOPROL-XL Your last dose was: Your next dose is:    
   
   
 Dose:  50 mg Take 50 mg by mouth nightly. Refills:  0  
     
   
   
   
  
 pravastatin 40 mg tablet Commonly known as:  PRAVACHOL Your last dose was: Your next dose is:    
   
   
 Dose:  40 mg Take 40 mg by mouth nightly. Refills:  0 STOP taking these medications   
 furosemide 20 mg tablet Commonly known as:  LASIX Where to Get Your Medications Information on where to get these meds will be given to you by the nurse or doctor. ! Ask your nurse or doctor about these medications  
  dexamethasone 2 mg tablet HYDROcodone-acetaminophen 5-325 mg per tablet  
 lactulose 10 gram/15 mL solution  
 metFORMIN  mg tablet  
 sodium chloride 1 gram tablet Discharge Instructions After Hospital Care Plan:  Discharge Instructions Lumbar Laminectomy Surgery Dr. Caesar Koyanagi Patient Name: Bill Kearney Date of procedure: 10/19/2017  Date of discharge: 10/23/2017 Procedure: Procedure(s): 
T11-L1 LAMINECTOMY AND RESECTION OF INTRADURAL EXTRAMEDULLARY SPINAL CORD TUMOR   PCP: Paula Moore MD 
 
Follow up appointments 
-Follow up with Dr. Caesar Koyanagi in 2 weeks. Call [phone] to make an appointment as soon as you get home from the hospital. 
 
When to call your Spine Surgeon: 
-Signs of infection-if your incision is red; continues to have drainage; drainage has a foul odor or if you have a persistent fever over 101 degrees for 24 hours 
-Nausea or vomiting, severe headache 
-Loss of bowel or bladder function, inability to urinate 
-Changes in sensation in your arms or legs (numbness, tingling, loss of color) -Increased weakness-greater than before your surgery 
-Severe pain or pain not relieved by medications 
-Signs of a blood clot in your leg-calf pain, tenderness, redness, swelling of lower leg When to call your Primary Care Physician: 
-Concerns about medical conditions such as diabetes, high blood pressure, asthma, congestive heart failure 
-Call if blood sugars are elevated, persistent headache or dizziness, coughing or congestion, constipation or diarrhea, burning with urination, abnormal heart rate When to call 067 and go to the nearest emergency room: 
-Acute onset of chest pain, shortness of breath, difficulty breathing Activity - You are going home a well person, be as active as possible. Your only exercise should be walking. Start with short frequent walks and increase your walking distance each day. 
-Limit the amount of time you sit to 20-30 minute intervals. Sitting for prolonged periods of time will be uncomfortable for you following surgery. 
-Do NOT lift anything over 5 pounds 
-Do NOT do any straining, twisting or bending 
-When you are in bed, you may lay on your back or on either side. Do NOT lie on your stomach Brace -Not all patients require a brace, your physician or their assistant will advise on whether you need a brace prior to discharge. -If you have a back brace, you should wear your brace at all times when you are out of bed. Do not wear the brace while in bed or showering. 
-Remember to always wear a cotton t-shirt underneath your brace. 
-Do not bend or twist when your brace is off Diet 
-Resume usual diet; drink plenty of fluids; eat foods high in fiber 
-It is important to have regular bowel movements. Pain medications may cause constipation. You may want to take a stool softener (such as Senokot-S or Colace) to prevent constipation. 
-If constipation occurs, take a laxative (such as Dulcolax tablets, Milk of Magnesia, or a suppository). Laxatives should only be used if the above preventable measures have failed and you still have not had a bowel movement after three days Driving 
-You may not drive or return to work until instructed by your physician. However, you may ride in the car for short periods of time. Incision Care 
-You may take brief showers but do not run the water run directly onto the wound. After showering or bathing, remove the wet dressing and gently blot the wound dry with a soft towel. 
-Do not rub or apply any lotions or ointments to your incision site.  
-Do not soak or scrub your wound 
-Keep a dry dressing (ABD and paper tape) on your incision and have it changed daily for 14 days after surgery; more often if your incision is draining. Have your caregiver wash their hands thoroughly before changing your dressing. 
-You will have absorbable sutures and steristrips (white tape) on your incision. Leave the steristrips on until they fall off. Showering 
-You may shower in approximately 2 days after your surgery.   
-Leave the dressing on during your shower. Do NOT allow the water to run directly onto your dressing.   Once you get out of the shower, put on a dry dressing. 
-Reminder- your brace can be removed while showering. Remember to not bend or twist while your brace is off.   
-Do not take a tub bath. Preventing blood clots 
-You have been given T.E.D. stockings to wear. Continue to wear these for 7 days after your discharge. Put them on in the morning and take them off at night.   
-They are used to increase your circulation and prevent blood clots from forming in your legs 
-T. E.D. stockings can be machine washed, temperature not to exceed 160° F (71°C) and machine dried for 15 to 20 minutes, temperature not to exceed 250° F (121°C). Pain management 
-Take pain medication as prescribed; decrease the amount you use as your pain lessens 
-Do not wait until you are in extreme pain to take your medication. 
-Avoid alcoholic beverages while taking pain medication Pain Medication Safety DO: 
-Read the Medication Guide  
-Take your medicine exactly as prescribed  
-Store your medicine away from children and in a safe place  
-Call your healthcare provider for medical advice about side effects. You may  report side effects to FDA at 0-992-FDA-5310.  
-Please be aware that many medications contain Tylenol. We do not want you to over medicate so please read the information below as a guide. Do not take more than 4 Grams of Tylenol in a 24 hour period. (There are 1000 milligrams in one Gram) Percocet contains 325 mg of Tylenol per tablet (do not take more than 12 tablets in 24 hours) Lortab contains 500 mg of Tylenol per tablet (do not take more than 8 tablets in 24 hours) Norco contains 325 mg of Tylenol per tablet (do not take more than 12 tablets in 24 hours).  
DO NOT: 
 -Do not give your medicine to others  
-Do not take medicine unless it was prescribed for you  
-Do not stop taking your medicine without talking to your healthcare provider  
-Do not break, chew, crush, dissolve, or inject your medicine. If you cannot  swallow your medicine whole, talk to your healthcare provider. 
-Do not drink alcohol while taking this medicine 
-Do not take anti-inflammatory medications or aspirin unless instructed by your  physician. Discharge Orders None Disruptive By [employer] Announcement We are excited to announce that we are making your provider's discharge notes available to you in [employer]. You will see these notes when they are completed and signed by the physician that discharged you from your recent hospital stay. If you have any questions or concerns about any information you see in [employer], please call the Health Information Department where you were seen or reach out to your Primary Care Provider for more information about your plan of care. Introducing [facility]! Paddy Graves introduces [employer] patient portal. Now you can access parts of your medical record, email your doctor's office, and request medication refills online. 1. In your internet browser, go to [email or web address] 2. Click on the First Time User? Click Here link in the Sign In box. You will see the New Member Sign Up page. 3. Enter your [employer] Access Code exactly as it appears below. You will not need to use this code after youve completed the sign-up process. If you do not sign up before the expiration date, you must request a new code. · [employer] Access Code: [email or web address] Expires: 12/31/2017 11:38 AM 
 
4. Enter the last four digits of your Social Security Number (xxxx) and Date of Birth (mm/dd/yyyy) as indicated and click Submit. You will be taken to the next sign-up page. 5. Create a [employer] ID.  This will be your [employer] login ID and cannot be changed, so think of one that is secure and easy to remember. 6. Create a [employer] password. You can change your password at any time. 7. Enter your Password Reset Question and Answer. This can be used at a later time if you forget your password. 8. Enter your e-mail address. You will receive e-mail notification when new information is available in [address] 9. Click Sign Up. You can now view and download portions of your medical record. 10. Click the Download Summary menu link to download a portable copy of your medical information. If you have questions, please visit the Frequently Asked Questions section of the [employer] website. Remember, [employer] is NOT to be used for urgent needs. For medical emergencies, dial 911. Now available from your iPhone and Android! General Information Please provide this summary of care documentation to your next provider. Patient Signature:  ____________________________________________________________ Date:  ____________________________________________________________  
  
Jefferson Cart Provider Signature:  ____________________________________________________________ Date:  ____________________________________________________________

## 2017-10-19 NOTE — IP AVS SNAPSHOT
1111 [facility] [address] [place] [address] 
[phone] Patient: Elena Sarabia MRN: [id number] GZQ:[id number] Current Discharge Medication List  
  
CONTINUE these medications which have CHANGED Dose & Instructions Dispensing Information Comments Morning Noon Evening Bedtime  
 dexamethasone 2 mg tablet Commonly known as:  DECADRON What changed:  additional instructions Your last dose was: Your next dose is: Take 1 tab PO twice a day x 2 days then 1 tab PO daily x 2 days then stop Quantity:  6 Tab Refills:  0  
     
   
   
   
  
 metFORMIN  mg tablet Commonly known as:  GLUCOPHAGE XR What changed:  when to take this Your last dose was: Your next dose is:    
   
   
 Dose:  750 mg Take 1 Tab by mouth two (2) times a day. Quantity:  60 Tab Refills:  0  
     
   
   
   
  
 sodium chloride 1 gram tablet What changed:  how much to take Your last dose was: Your next dose is:    
   
   
 Dose:  1 g Take 1 Tab by mouth three (3) times daily (with meals). Quantity:  20 Tab Refills:  0 CONTINUE these medications which have NOT CHANGED Dose & Instructions Dispensing Information Comments Morning Noon Evening Bedtime  
 acetaminophen 325 mg tablet Commonly known as:  TYLENOL Your last dose was: Your next dose is:    
   
   
 Dose:  650 mg Take 2 Tabs by mouth every four (4) hours as needed. Indications: Headache Disorder Quantity:  30 Tab Refills:  0 ALPRAZolam 0.25 mg tablet Commonly known as:  Marquez Chamberlain Your last dose was: Your next dose is:    
   
   
 Dose:  0.125-0.25 mg Take 0.125-0.25 mg by mouth two (2) times daily as needed for Anxiety (with steroid). Refills:  0 HYDROcodone-acetaminophen 5-325 mg per tablet Commonly known as:  Jessica Phillips  
   
 Your last dose was: Your next dose is:    
   
   
 Dose:  1 Tab Take 1 Tab by mouth every four (4) hours as needed. Max Daily Amount: 6 Tabs. PRN severe pain Quantity:  20 Tab Refills:  0  
     
   
   
   
  
 L. acidoph & paracasei- S therm- Bifido 8 billion cell Cap cap Commonly known as:  AYLIN-Q/RISAQUAD Your last dose was: Your next dose is:    
   
   
 Dose:  1 Cap Take 1 Cap by mouth daily. Quantity:  7 Cap Refills:  0  
     
   
   
   
  
 lactulose 10 gram/15 mL solution Commonly known as:  Erin Haque Your last dose was: Your next dose is:    
   
   
 Dose:  30 g Take 45 mL by mouth two (2) times a day for 5 days. Quantity:  450 mL Refills:  0  
     
   
   
   
  
 levothyroxine 75 mcg tablet Commonly known as:  SYNTHROID Your last dose was: Your next dose is:    
   
   
 Dose:  75 mcg Take 75 mcg by mouth Daily (before breakfast). Refills:  0  
     
   
   
   
  
 metoprolol succinate 50 mg XL tablet Commonly known as:  TOPROL-XL Your last dose was: Your next dose is:    
   
   
 Dose:  50 mg Take 50 mg by mouth nightly. Refills:  0  
     
   
   
   
  
 pravastatin 40 mg tablet Commonly known as:  PRAVACHOL Your last dose was: Your next dose is:    
   
   
 Dose:  40 mg Take 40 mg by mouth nightly. Refills:  0 STOP taking these medications   
 furosemide 20 mg tablet Commonly known as:  LASIX Where to Get Your Medications Information on where to get these meds will be given to you by the nurse or doctor. ! Ask your nurse or doctor about these medications  
  dexamethasone 2 mg tablet HYDROcodone-acetaminophen 5-325 mg per tablet  
 lactulose 10 gram/15 mL solution  
 metFORMIN  mg tablet  
 sodium chloride 1 gram tablet

## 2017-10-19 NOTE — ANESTHESIA PREPROCEDURE EVALUATION
Anesthetic History     PONV          Review of Systems / Medical History      Pulmonary                   Neuro/Psych              Cardiovascular    Hypertension                   GI/Hepatic/Renal     GERD           Endo/Other    Diabetes  Hypothyroidism       Other Findings              Physical Exam    Airway  Mallampati: II  TM Distance: > 6 cm  Neck ROM: normal range of motion   Mouth opening: Normal     Cardiovascular  Regular rate and rhythm,  S1 and S2 normal,  no murmur, click, rub, or gallop             Dental  No notable dental hx       Pulmonary  Breath sounds clear to auscultation               Abdominal  GI exam deferred       Other Findings            Anesthetic Plan    ASA: 2  Anesthesia type: general          Induction: Intravenous  Anesthetic plan and risks discussed with: Patient

## 2017-10-19 NOTE — BRIEF OP NOTE
BRIEF OPERATIVE NOTE    Date of Procedure: 10/19/2017   Preoperative Diagnosis: INTRADURAL SPINAL CORD TUMOR  Postoperative Diagnosis: INTRADURAL SPINAL CORD TUMOR    Procedure(s):  T11-L1 LAMINECTOMY AND RESECTION OF INTRADURAL EXTRAMEDULLARY SPINAL CORD TUMOR   Surgeon(s) and Role:     * Mamadou Cannon MD - Primary         Assistant Staff:       Surgical Staff:  Circ-1: Marcial Inman  Circ-Relief: Shailesh Jacob RN  Scrub RN-1: Kay العلي RN  Surg Asst-1: Vasyl Hayden  Surg Asst-Relief: Kendall Reyna  Float Staff: Tom Shah RN  Event Time In   Incision Start 1718   Incision Close 1925     Anesthesia: General   Estimated Blood Loss: 100  Specimens:   ID Type Source Tests Collected by Time Destination   1 : intradural spinal mass Frozen Section Spinal  Mamadou Cannon MD 10/19/2017 1818 Pathology   2 : Intradural spinal mass Fresh Spinal  Mamadou Cannon MD 10/19/2017 1822 Pathology      Findings: hemorrhagic mass   Complications: no  Implants: * No implants in log *

## 2017-10-20 LAB
ANION GAP SERPL CALC-SCNC: 5 MMOL/L (ref 5–15)
BUN SERPL-MCNC: 14 MG/DL (ref 6–20)
BUN/CREAT SERPL: 25 (ref 12–20)
CALCIUM SERPL-MCNC: 7.8 MG/DL (ref 8.5–10.1)
CHLORIDE SERPL-SCNC: 99 MMOL/L (ref 97–108)
CO2 SERPL-SCNC: 30 MMOL/L (ref 21–32)
CREAT SERPL-MCNC: 0.55 MG/DL (ref 0.55–1.02)
ERYTHROCYTE [DISTWIDTH] IN BLOOD BY AUTOMATED COUNT: 13.3 % (ref 11.5–14.5)
GLUCOSE BLD STRIP.AUTO-MCNC: 139 MG/DL (ref 65–100)
GLUCOSE BLD STRIP.AUTO-MCNC: 144 MG/DL (ref 65–100)
GLUCOSE BLD STRIP.AUTO-MCNC: 162 MG/DL (ref 65–100)
GLUCOSE BLD STRIP.AUTO-MCNC: 254 MG/DL (ref 65–100)
GLUCOSE BLD STRIP.AUTO-MCNC: 268 MG/DL (ref 65–100)
GLUCOSE SERPL-MCNC: 170 MG/DL (ref 65–100)
HCT VFR BLD AUTO: 38.7 % (ref 35–47)
HGB BLD-MCNC: 12.9 G/DL (ref 11.5–16)
MCH RBC QN AUTO: 29.4 PG (ref 26–34)
MCHC RBC AUTO-ENTMCNC: 33.3 G/DL (ref 30–36.5)
MCV RBC AUTO: 88.2 FL (ref 80–99)
PLATELET # BLD AUTO: 298 K/UL (ref 150–400)
POTASSIUM SERPL-SCNC: 4.6 MMOL/L (ref 3.5–5.1)
RBC # BLD AUTO: 4.39 M/UL (ref 3.8–5.2)
SERVICE CMNT-IMP: ABNORMAL
SODIUM SERPL-SCNC: 134 MMOL/L (ref 136–145)
WBC # BLD AUTO: 10.4 K/UL (ref 3.6–11)

## 2017-10-20 PROCEDURE — 65660000000 HC RM CCU STEPDOWN

## 2017-10-20 PROCEDURE — 74011250637 HC RX REV CODE- 250/637: Performed by: NURSE PRACTITIONER

## 2017-10-20 PROCEDURE — 74011250637 HC RX REV CODE- 250/637: Performed by: NEUROLOGICAL SURGERY

## 2017-10-20 PROCEDURE — 80048 BASIC METABOLIC PNL TOTAL CA: CPT | Performed by: NEUROLOGICAL SURGERY

## 2017-10-20 PROCEDURE — 85027 COMPLETE CBC AUTOMATED: CPT | Performed by: NEUROLOGICAL SURGERY

## 2017-10-20 PROCEDURE — 36415 COLL VENOUS BLD VENIPUNCTURE: CPT | Performed by: NEUROLOGICAL SURGERY

## 2017-10-20 PROCEDURE — 74011250637 HC RX REV CODE- 250/637: Performed by: HOSPITALIST

## 2017-10-20 PROCEDURE — 74011250636 HC RX REV CODE- 250/636: Performed by: NEUROLOGICAL SURGERY

## 2017-10-20 PROCEDURE — 82962 GLUCOSE BLOOD TEST: CPT

## 2017-10-20 PROCEDURE — 74011636637 HC RX REV CODE- 636/637: Performed by: HOSPITALIST

## 2017-10-20 RX ORDER — SODIUM CHLORIDE TAB 1 GM 1 G
2 TAB MISCELLANEOUS
Status: DISCONTINUED | OUTPATIENT
Start: 2017-10-20 | End: 2017-10-20

## 2017-10-20 RX ORDER — INSULIN LISPRO 100 [IU]/ML
INJECTION, SOLUTION INTRAVENOUS; SUBCUTANEOUS
Status: DISCONTINUED | OUTPATIENT
Start: 2017-10-20 | End: 2017-10-20 | Stop reason: SDUPTHER

## 2017-10-20 RX ORDER — METFORMIN HYDROCHLORIDE 750 MG/1
750 TABLET, EXTENDED RELEASE ORAL
Status: DISCONTINUED | OUTPATIENT
Start: 2017-10-20 | End: 2017-10-23 | Stop reason: HOSPADM

## 2017-10-20 RX ORDER — HYDROCODONE BITARTRATE AND ACETAMINOPHEN 5; 325 MG/1; MG/1
1 TABLET ORAL
Status: DISCONTINUED | OUTPATIENT
Start: 2017-10-20 | End: 2017-10-23 | Stop reason: HOSPADM

## 2017-10-20 RX ORDER — DEXAMETHASONE SODIUM PHOSPHATE 4 MG/ML
4 INJECTION, SOLUTION INTRA-ARTICULAR; INTRALESIONAL; INTRAMUSCULAR; INTRAVENOUS; SOFT TISSUE EVERY 8 HOURS
Status: DISCONTINUED | OUTPATIENT
Start: 2017-10-20 | End: 2017-10-21

## 2017-10-20 RX ORDER — MAGNESIUM SULFATE 100 %
4 CRYSTALS MISCELLANEOUS AS NEEDED
Status: DISCONTINUED | OUTPATIENT
Start: 2017-10-20 | End: 2017-10-20 | Stop reason: SDUPTHER

## 2017-10-20 RX ORDER — INSULIN LISPRO 100 [IU]/ML
INJECTION, SOLUTION INTRAVENOUS; SUBCUTANEOUS
Status: DISCONTINUED | OUTPATIENT
Start: 2017-10-20 | End: 2017-10-21

## 2017-10-20 RX ORDER — DEXTROSE 50 % IN WATER (D50W) INTRAVENOUS SYRINGE
12.5-25 AS NEEDED
Status: DISCONTINUED | OUTPATIENT
Start: 2017-10-20 | End: 2017-10-20 | Stop reason: SDUPTHER

## 2017-10-20 RX ADMIN — HYDROMORPHONE HYDROCHLORIDE 4 MG: 4 TABLET ORAL at 01:17

## 2017-10-20 RX ADMIN — HYDROCODONE BITARTRATE AND ACETAMINOPHEN 1 TABLET: 5; 325 TABLET ORAL at 14:09

## 2017-10-20 RX ADMIN — HYDROMORPHONE HYDROCHLORIDE 4 MG: 4 TABLET ORAL at 10:25

## 2017-10-20 RX ADMIN — STANDARDIZED SENNA CONCENTRATE AND DOCUSATE SODIUM 1 TABLET: 8.6; 5 TABLET, FILM COATED ORAL at 08:45

## 2017-10-20 RX ADMIN — Medication 10 ML: at 14:46

## 2017-10-20 RX ADMIN — METOPROLOL SUCCINATE 50 MG: 50 TABLET, EXTENDED RELEASE ORAL at 22:12

## 2017-10-20 RX ADMIN — Medication 10 ML: at 05:58

## 2017-10-20 RX ADMIN — DEXAMETHASONE SODIUM PHOSPHATE 4 MG: 4 INJECTION INTRA-ARTICULAR; INTRALESIONAL; INTRAMUSCULAR; INTRAVENOUS; SOFT TISSUE at 17:26

## 2017-10-20 RX ADMIN — PRAVASTATIN SODIUM 40 MG: 40 TABLET ORAL at 22:12

## 2017-10-20 RX ADMIN — ALPRAZOLAM 0.25 MG: 0.25 TABLET ORAL at 22:12

## 2017-10-20 RX ADMIN — LEVOTHYROXINE SODIUM 75 MCG: 75 TABLET ORAL at 06:51

## 2017-10-20 RX ADMIN — HYDROCODONE BITARTRATE AND ACETAMINOPHEN 1 TABLET: 5; 325 TABLET ORAL at 22:12

## 2017-10-20 RX ADMIN — HYDROMORPHONE HYDROCHLORIDE 4 MG: 4 TABLET ORAL at 05:58

## 2017-10-20 RX ADMIN — ALPRAZOLAM 0.25 MG: 0.25 TABLET ORAL at 10:56

## 2017-10-20 RX ADMIN — SODIUM CHLORIDE TAB 1 GM 2 G: 1 TAB at 10:56

## 2017-10-20 RX ADMIN — ALPRAZOLAM 0.25 MG: 0.25 TABLET ORAL at 00:28

## 2017-10-20 RX ADMIN — INSULIN LISPRO 5 UNITS: 100 INJECTION, SOLUTION INTRAVENOUS; SUBCUTANEOUS at 17:28

## 2017-10-20 RX ADMIN — HYDROCODONE BITARTRATE AND ACETAMINOPHEN 1 TABLET: 5; 325 TABLET ORAL at 18:07

## 2017-10-20 RX ADMIN — METFORMIN HYDROCHLORIDE 750 MG: 750 TABLET, EXTENDED RELEASE ORAL at 18:07

## 2017-10-20 RX ADMIN — DEXAMETHASONE SODIUM PHOSPHATE 4 MG: 4 INJECTION INTRA-ARTICULAR; INTRALESIONAL; INTRAMUSCULAR; INTRAVENOUS; SOFT TISSUE at 00:28

## 2017-10-20 RX ADMIN — DEXAMETHASONE SODIUM PHOSPHATE 4 MG: 4 INJECTION INTRA-ARTICULAR; INTRALESIONAL; INTRAMUSCULAR; INTRAVENOUS; SOFT TISSUE at 10:56

## 2017-10-20 RX ADMIN — FUROSEMIDE 20 MG: 20 TABLET ORAL at 08:50

## 2017-10-20 RX ADMIN — PANTOPRAZOLE SODIUM 40 MG: 40 TABLET, DELAYED RELEASE ORAL at 06:50

## 2017-10-20 RX ADMIN — Medication 10 ML: at 22:13

## 2017-10-20 RX ADMIN — INSULIN LISPRO 3 UNITS: 100 INJECTION, SOLUTION INTRAVENOUS; SUBCUTANEOUS at 22:20

## 2017-10-20 NOTE — PROGRESS NOTES
Problem: Falls - Risk of  Goal: *Absence of Falls  Document Atmara Fall Risk and appropriate interventions in the flowsheet.    Outcome: Progressing Towards Goal  Fall Risk Interventions:            Medication Interventions: Evaluate medications/consider consulting pharmacy, Patient to call before getting OOB, Teach patient to arise slowly    Elimination Interventions: Call light in reach, Patient to call for help with toileting needs, Toileting schedule/hourly rounds

## 2017-10-20 NOTE — PERIOP NOTES
TRANSFER - OUT REPORT:    Verbal report given to [facility] (name) on Marge Altamirano  being transferred to NSTU room [phone] (unit) for routine post - op       Report consisted of patients Situation, Background, Assessment and   Recommendations(SBAR). Time Pre op antibiotic given:1704   Anesthesia Stop time: 1948  Jaron Present on Transfer to floor:YES  YES    Information from the following report(s) SBAR, Kardex, OR Summary, Intake/Output, MAR, Med Rec Status and Cardiac Rhythm SR was reviewed with the receiving nurse. Opportunity for questions and clarification was provided. Is the patient on 02? YES       L/Min 2L           Is the patient on a monitor? YES    Is the nurse transporting with the patient? YES    Surgical Waiting Area notified of patient's transfer from PACU?  YES      The following personal items collected during your admission accompanied patient upon transfer:   Dental Appliance: Dental Appliances:  (CROWNS:  MOLARS.)  Vision: Visual Aid: Glasses  Hearing Aid:    Jewelry:    Clothing: Clothing: Other (comment) (clothing bag to pacu)  Other Valuables:    Valuables sent to safe:

## 2017-10-20 NOTE — PROGRESS NOTES
Problem: Falls - Risk of  Goal: *Absence of Falls  Document Tamara Fall Risk and appropriate interventions in the flowsheet.    Outcome: Progressing Towards Goal  Fall Risk Interventions:            Medication Interventions: Evaluate medications/consider consulting pharmacy, Patient to call before getting OOB, Teach patient to arise slowly    Elimination Interventions: Call light in reach, Toilet paper/wipes in reach, Toileting schedule/hourly rounds, Patient to call for help with toileting needs

## 2017-10-20 NOTE — PROGRESS NOTES
Physical Therapy  Orders received and chart reviewed. Noted to be on bedrest today. Will follow up tomorrow or once bedrest orders removed.   Bea Summers, PT

## 2017-10-20 NOTE — PROGRESS NOTES
Occupational Therapy Note:  Orders received and appreciated. Chart reviewed. Noted pt on bedrest today following spine sx. Will follow up over the weekend with OT eval.  Thank you for the consult.   Ruben Turner, OTR/L, CBIS

## 2017-10-20 NOTE — INTERDISCIPLINARY ROUNDS
IDR/SLIDR Summary          Patient: Aida Driscoll MRN: [id number]    Age: 57 y.o. YOB: 1955 Room/Bed: [facility]   Admit Diagnosis: INTRADURAL SPINAL CORD TUMOR  Intradural extramedullary spinal tumor  Principal Diagnosis: <principal problem not specified>   Goals: safety, infection prevention  Readmission: NO  Quality Measure: Not applicable  VTE Prophylaxis: Mechanical  Influenza Vaccine screening completed? YES  Pneumococcal Vaccine screening completed? NO  Mobility needs: No   Nutrition plan:No  Consults: P. T    Financial concerns:No  Escalated to CM? NO  RRAT Score: 12   Interventions:H2H  Testing due for pt today?  NO  LOS: 1 days Expected length of stay 3 days  Discharge plan: home   PCP: Pio Atkinson MD  Transportation needs: No    Days before discharge:two or more days before discharge   Discharge disposition: Home    Signed:     Ashlie Sauceda  10/20/2017  2:47 AM

## 2017-10-20 NOTE — CONSULTS
NEPHROLOGY CONSULT NOTE     Patient: Stephania Hannon MRN: [id number]  PCP: Radha Barnard MD   :     1955  Age:   57 y.o. Sex:  female      Referring physician: Bijal Robledo MD  Reason for consultation: 57 y.o. 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  female who has been admitted to the hospital for an elective resection of a spinal cord lesion. She underwent a T11-L1 laminectomy and resection of intradural extramedullary spinal cord tumor on 10/19. We saw her last admission for acute hyponatremia, likely from a combination of SIADH from pain and excess water consumption. She has been stable on lasix, salt tabs and fluid restriction. Her surgery was uneventful, her pain is much better this AM and her Na is now 134. No cp, sob, n/v/d, edema, or pain. Past Medical Hx:   Past Medical History:   Diagnosis Date    Anxiety     Diabetes ([facility] [address]     type 2; NIDDM    GERD (gastroesophageal reflux disease)     Hypercholesteremia     Hypertension     Hypothyroidism         Past Surgical Hx:   History reviewed. No pertinent surgical history. Medications:  Prior to Admission medications    Medication Sig Start Date End Date Taking? Authorizing Provider   acetaminophen (TYLENOL) 325 mg tablet Take 2 Tabs by mouth every four (4) hours as needed.  Indications: Headache Disorder 10/13/17  Yes Omari Zurita NP   furosemide (LASIX) 20 mg tablet Take 1/2 tab PO daily 10/13/17  Yes Omari Zurita NP   L. acidoph & paracasei- S therm- Bifido (AYLIN-Q/RISAQUAD) 8 billion cell cap cap Take 1 Cap by mouth daily. 10/13/17  Yes Omari Zurita NP   sodium chloride 1 gram tablet Take 2 Tabs by mouth three (3) times daily (with meals). 10/13/17  Yes Omari Zurita NP   dexamethasone (DECADRON) 2 mg tablet Three times a day 10/13/17  Yes Omari Zurita NP   ALPRAZolam Dena Inches) 0.25 mg tablet Take 0.125-0.25 mg by mouth two (2) times daily as needed for Anxiety (with steroid). Yes Historical Provider   HYDROcodone-acetaminophen (NORCO) 5-325 mg per tablet Take 1 Tab by mouth every four (4) hours as needed. Max Daily Amount: 6 Tabs. PRN severe pain 10/7/17  Yes Enrique Solorzano MD   metoprolol succinate (TOPROL-XL) 50 mg XL tablet Take 50 mg by mouth nightly. Yes hSarmila Benitez MD   pravastatin (PRAVACHOL) 40 mg tablet Take 40 mg by mouth nightly. Yes Sharmila Benitez MD   levothyroxine (SYNTHROID) 75 mcg tablet Take 75 mcg by mouth Daily (before breakfast). Yes Sharmila Benitez MD   metFORMIN ER (GLUCOPHAGE XR) 750 mg tablet Take 750 mg by mouth daily (after dinner). Yes Sharmila Benitez MD   lactulose (CHRONULAC) 10 gram/15 mL solution Take 45 mL by mouth two (2) times a day for 7 days. 10/13/17 10/20/17  Omari Zurita NP       No Known Allergies    Social Hx:  reports that she has never smoked. She has never used smokeless tobacco. She reports that she does not drink alcohol or use illicit drugs. Family History   Problem Relation Age of Onset    Stroke Father     Anesth Problems Neg Hx        Review of Systems:  A twelve point review of system was performed today. Pertinent positives and negatives are mentioned in the HPI. The reminder of the ROS is negative and noncontributory.      Objective:    Vitals:    Vitals:    10/20/17 0300 10/20/17 0700 10/20/17 0850 10/20/17 1100   BP: 135/75 140/72 126/61 140/82   Pulse: 65 (!) 57 76 75   Resp: 14 16  13   Temp: 98.1 °F (36.7 °C) 97.4 °F (36.3 °C)  97.7 °F (36.5 °C)   SpO2: 96% 94%  97%   Weight: 68.2 kg (150 lb 5 oz)      Height:         I&O's:  10/19 0701 - 10/20 0700  In: 2840.8 [P.O.:1120; I.V.:1720.8]  Out: 2400 [Urine:2300]  Visit Vitals    /82 (BP 1 Location: Right arm, BP Patient Position: At rest)    Pulse 75    Temp 97.7 °F (36.5 °C)    Resp 13    Ht 5' 3\" (1.6 m)    Wt 68.2 kg (150 lb 5 oz)    SpO2 97%    BMI 26.63 kg/m2       Physical Exam:  General:Alert, No distress,   HEENT: Eyes are PERRL. Conjunctiva without pallor ,erythema. The sclerae without icterus. .   Neck:Supple,no mass palpable  Lungs : Clears to auscultation Bilaterally, Normal respiratory effort  CVS: RRR, S1 S2 normal, No rub, no LE edema  Abdomen: Soft, Non tender, No hepatosplenomegaly, bowel sounds present  Extremities: No cyanosis, No clubbing  Skin: No rash or lesions.   Lymph nodes: No palpable nodes  MS: No joint swelling, erythema, warmth  Neurologic: non focal, AAO x 3  Psych: normal affect    Laboratory Results:    Lab Results   Component Value Date    BUN 14 10/20/2017     (L) 10/20/2017    K 4.6 10/20/2017    CL 99 10/20/2017    CO2 30 10/20/2017       Lab Results   Component Value Date    BUN 14 10/20/2017    BUN 14 10/13/2017    BUN 14 10/13/2017    BUN 10 10/12/2017    BUN 11 10/11/2017    K 4.6 10/20/2017    K 4.2 10/13/2017    K 4.1 10/13/2017    K 4.4 10/12/2017    K HEMOLYZED,RECOLLECT REQUESTED 10/11/2017       Lab Results   Component Value Date    WBC 10.4 10/20/2017    RBC 4.39 10/20/2017    HGB 12.9 10/20/2017    HCT 38.7 10/20/2017    MCV 88.2 10/20/2017    MCH 29.4 10/20/2017    RDW 13.3 10/20/2017     10/20/2017       Lab Results   Component Value Date    PHOS 3.3 10/11/2017       Urine dipstick:   Lab Results   Component Value Date/Time    Color YELLOW/STRAW 10/10/2017 07:11 PM    Appearance CLEAR 10/10/2017 07:11 PM Specific gravity 1.022 10/10/2017 07:11 PM    pH (UA) 6.5 10/10/2017 07:11 PM    Protein NEGATIVE  10/10/2017 07:11 PM    Glucose 100 10/10/2017 07:11 PM    Ketone NEGATIVE  10/10/2017 07:11 PM    Bilirubin NEGATIVE  10/10/2017 07:11 PM    Urobilinogen 0.2 10/10/2017 07:11 PM    Nitrites NEGATIVE  10/10/2017 07:11 PM    Leukocyte Esterase NEGATIVE  10/10/2017 07:11 PM    Epithelial cells FEW 10/10/2017 07:11 PM    Bacteria NEGATIVE  10/10/2017 07:11 PM    WBC 0-4 10/10/2017 07:11 PM    RBC 0-5 10/10/2017 07:11 PM       I have reviewed the following: All pertinent labs, microbiology data, radiology imaging for my assessment         Thank you for allowing us to participate in the care of this patient. We will follow patient.  Please dont hesitate to call with any questions    Sea Sauer MD  10/20/2017    [address] [place] [address]

## 2017-10-20 NOTE — ANESTHESIA POSTPROCEDURE EVALUATION
Post-Anesthesia Evaluation and Assessment    Patient: Armida Gay MRN: [id number]  SSN: [id number]    YOB: 1955  Age: 57 y.o. Sex: female       Cardiovascular Function/Vital Signs  Visit Vitals    /72 (BP 1 Location: Right arm, BP Patient Position: At rest)    Pulse (!) 57    Temp 36.3 °C (97.4 °F)    Resp 16    Ht 5' 3\" (1.6 m)    Wt 68.2 kg (150 lb 5 oz)    SpO2 94%    BMI 26.63 kg/m2       Patient is status post general anesthesia for Procedure(s):  T11-L1 LAMINECTOMY AND RESECTION OF INTRADURAL EXTRAMEDULLARY SPINAL CORD TUMOR . Nausea/Vomiting: None    Postoperative hydration reviewed and adequate. Pain:  Pain Scale 1: Numeric (0 - 10) (10/20/17 0700)  Pain Intensity 1: 0 (10/20/17 0700)   Managed    Neurological Status:   Neuro (WDL): Within Defined Limits (10/19/17 2020)  Neuro  Neurologic State: Alert (10/19/17 2100)  Orientation Level: Oriented X4 (10/19/17 2100)  Cognition: Follows commands (10/19/17 2100)  Speech: Clear (10/19/17 2100)  Assessment L Pupil: Brisk;Round (10/19/17 2100)  Size L Pupil (mm): 3 (10/19/17 2100)  Assessment R Pupil: Brisk;Round (10/19/17 2100)  Size R Pupil (mm): 3 (10/19/17 2100)  LUE Motor Response: Purposeful (10/19/17 2100)  LLE Motor Response: Purposeful (10/19/17 2100)  RUE Motor Response: Purposeful (10/19/17 2100)  RLE Motor Response: Purposeful (10/19/17 2100)   At baseline    Mental Status and Level of Consciousness: Arousable    Pulmonary Status:   O2 Device: Room air (10/20/17 0700)   Adequate oxygenation and airway patent    Complications related to anesthesia: None    Post-anesthesia assessment completed.  No concerns    Signed By: Pato Servin MD     October 20, 2017

## 2017-10-20 NOTE — ROUTINE PROCESS
Primary Nurse Pati Shepherd and Luna RN performed a dual skin assessment on this patient Impairment noted- see wound doc flow sheet  Rizwan score is 23

## 2017-10-20 NOTE — PROGRESS NOTES
Neurosurgery Progress Note  [facility] [place]  [phone]        Admit Date: 10/19/2017   LOS: 1 day        Daily Progress Note: 10/20/2017    POD:1 Day Post-Op    S/P: Procedure(s):  T11-L1 LAMINECTOMY AND RESECTION OF INTRADURAL EXTRAMEDULLARY SPINAL CORD TUMOR     Subjective:   Pt underwent surgical resection of a spinal lesion yesterday. This morning she is doing well. Pain is improved. Denies numbness, tingling, chest pain, leg pain, nausea, vomiting, difficulty swallowing, headache, and dyspnea. Objective:     Vital signs  Temp (24hrs), Av.8 °F (36.6 °C), Min:97.4 °F (36.3 °C), Max:98.5 °F (36.9 °C)   10/20 0701 - 10/20 1900  In: -   Out: 8477 [RNTGZ:1448]  10/18 1901 - 10/20 07  In: 2840.8 [P.O.:1120; I.V.:1720.8]  Out: 2400 [Urine:2300]    Visit Vitals    /67 (BP 1 Location: Right arm, BP Patient Position: At rest)    Pulse 80    Temp 98.5 °F (36.9 °C)    Resp 18    Ht 5' 3\" (1.6 m)    Wt 68.2 kg (150 lb 5 oz)    SpO2 95%    BMI 26.63 kg/m2      O2 Device: Room air     Pain control  Pain Assessment  Pain Scale 1: Numeric (0 - 10)  Pain Intensity 1: 0  Pain Location 1: Back  Pain Orientation 1: Lower  Pain Description 1: Aching  Pain Intervention(s) 1: Medication (see MAR)    PT/OT  Gait                 Physical Exam:  Gen:NAD. Neuro: A&Ox3. Follows commands. Speech clear. Affect normal.  PERRL.  GLYNN. Strength 5/5 in UE and LE BL. Negative drift. Gait deferred. Skin: Incision C/D/I with steri-strips in place.     24 hour results:    Recent Results (from the past 24 hour(s))   GLUCOSE, POC    Collection Time: 10/19/17  8:20 PM   Result Value Ref Range    Glucose (POC) 136 (H) 65 - 100 mg/dL    Performed by [address] [place] [address], POC    Collection Time: 10/19/17 11:52 PM   Result Value Ref Range    Glucose (POC) 162 (H) 65 - 100 mg/dL    Performed by CHILANGO UNNEZ W/O DIFF    Collection Time: 10/20/17  2:57 AM   Result Value Ref Range    WBC 10.4 3.6 - 11.0 K/uL    RBC 4.39 3.80 - 5.20 M/uL    HGB 12.9 11.5 - 16.0 g/dL    HCT 38.7 35.0 - 47.0 %    MCV 88.2 80.0 - 99.0 FL    MCH 29.4 26.0 - 34.0 PG    MCHC 33.3 30.0 - 36.5 g/dL    RDW 13.3 11.5 - 14.5 %    PLATELET 900 472 - 759 K/uL   METABOLIC PANEL, BASIC    Collection Time: 10/20/17  2:57 AM   Result Value Ref Range    Sodium 134 (L) 136 - 145 mmol/L    Potassium 4.6 3.5 - 5.1 mmol/L    Chloride 99 97 - 108 mmol/L    CO2 30 21 - 32 mmol/L    Anion gap 5 5 - 15 mmol/L    Glucose 170 (H) 65 - 100 mg/dL    BUN 14 6 - 20 MG/DL    Creatinine 0.55 0.55 - 1.02 MG/DL    BUN/Creatinine ratio 25 (H) 12 - 20      GFR est AA >60 >60 ml/min/1.73m2    GFR est non-AA >60 >60 ml/min/1.73m2    Calcium 7.8 (L) 8.5 - 10.1 MG/DL   GLUCOSE, POC    Collection Time: 10/20/17  5:29 AM   Result Value Ref Range    Glucose (POC) 139 (H) 65 - 100 mg/dL    Performed by CHILANGO MORATAYA    GLUCOSE, POC    Collection Time: 10/20/17 11:49 AM   Result Value Ref Range    Glucose (POC) 144 (H) 65 - 100 mg/dL    Performed by Hero Wheeler           Assessment:     Active Problems:    Intradural extramedullary spinal tumor (10/19/2017)        Plan:   1. Intradural, extramedullary mass with intradural hemorrhage                        - s/p surgical rsxn. Path pending.    - bedrest today. Raise HOB at 1700. OOB tomorrow. Sal[place]on out tomorrow    - PT/OT tomorrow    - Cont decadron  2. Diabetes mellitus, type 2                        - Hold metformin                        - Hospitalist consult  3. Hyponatremia                        - Na 134                        - Nephrology following  4. Constipation   - Lactulose bid starting tomorrow  5. HTN                        - Cont metoprolol                        - Hydralazine and labetalol PRN                        - SBP<160  6. Hypothyroidism                        - Cont levothyroxine                        - TSH 0.69, free T4 1.4  7.  Dyslipidemia                        - Cont pravastatin    Activity: bedrest until tomorrow  DVT ppx: SCDs  Dispo: tbd    Plan d/w Dr. Ranjana Hilario, NP

## 2017-10-20 NOTE — CONSULTS
[address]       Name:  Deneice Galeazzi   MR#:  [id number]   :  1955   Account #:  [de-identified]    Date of Consultation:  10/20/2017   Date of Adm:  10/19/2017       REASON FOR CONSULT: Diabetes management. PHYSICIAN REQUESTING CONSULTATION: Bernice Harper MD    HISTORY OF PRESENT ILLNESS: The patient is a 75-year-old   female who has previous history significant for hypertension, diabetes   and hyperlipidemia, she was admitted last night for laminectomy and   resection of intradural and extramedullary spinal cord tumor. We are   asked to see her for evaluation of diabetes. The patient has been on   Glucophage  mg daily for many years. According to the patient  her,   glycemic control is good. She has started eating again. She has no IV   contrast given recently. In the hospital, her sugar has been running in   the 130-160 range. PAST MEDICAL HISTORY: Significant for   1. Hypertension. 2. Diabetes. 3. Hyperlipidemia. 4. Hypothyroidism. SOCIOECONOMIC HISTORY: The patient is . 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 FAMILY HISTORY: Significant for stroke and diabetes. SURGICAL HISTORY: Unremarkable, except for laminectomy done   yesterday. MEDICATIONS PRIOR TO ADMISSION INCLUDED   1. Synthroid 75 mcg daily. 2. Glucophage  mg daily. 3. Toprol XL 50 mg daily. 4. Pravachol 40 mg daily. PHYSICAL EXAMINATION   GENERAL: The patient is a 75-year-old female, not in any acute   distress, resting comfortably. VITAL SIGNS: Reveals temperature of 97.7, blood pressure 140/82,   pulse 75, respiratory rate is 16. HEENT: Reveals pupils equally reacting to light and accommodation. NECK: Supple. There is no lymphadenopathy or JVD. CHEST: Clear. No wheezing or crackles. CARDIOVASCULAR: S1 and S2 regular. No murmur. No S3.   ABDOMEN: No tenderness, no guarding, no rigidity.  Bowel sounds are   active. EXTREMITIES: No pedal edema. CENTRAL NERVOUS SYSTEM Grossly unremarkable. LABORATORY DATA: Lab work done revealed a white count of 10.4,   hemoglobin of 12.9, hematocrit 38.7, MCV of 88.2, platelet count is   399,847. Chemistries: Sodium 134, potassium 4.6, chloride 99,   bicarbonate 30, gap of 5, glucose 170, BUN is 14, creatinine 0.55,   calcium is 7.8. ASSESSMENT AND PLAN   1. The patient is a 68-year-old female who has type 2 diabetes, which   is well controlled with metformin. The patient has started eating. I will   restart her on her home dose of Metformin. 2. Monitor blood sugars. 3. Since the patient is on steroids, her sugar might run elevated. Will   add sliding scale insulin. 4. Hypertension, which is well controlled with current beta blocker. Will   continue. 5. Hyperlipidemia. Continue statin. 6. Check a glycohemoglobin. Last glycohemoglobin was 6.5, which   shows good glycemic control.         Amandeep Conway MD      [facility] / Armida Franklin   D:  10/20/2017   11:22   T:  10/20/2017   11:46   Job #:  [id number]

## 2017-10-21 LAB
ANION GAP SERPL CALC-SCNC: 8 MMOL/L (ref 5–15)
BUN SERPL-MCNC: 17 MG/DL (ref 6–20)
BUN/CREAT SERPL: 31 (ref 12–20)
CALCIUM SERPL-MCNC: 8.1 MG/DL (ref 8.5–10.1)
CHLORIDE SERPL-SCNC: 100 MMOL/L (ref 97–108)
CO2 SERPL-SCNC: 27 MMOL/L (ref 21–32)
CREAT SERPL-MCNC: 0.55 MG/DL (ref 0.55–1.02)
EST. AVERAGE GLUCOSE BLD GHB EST-MCNC: 154 MG/DL
GLUCOSE BLD STRIP.AUTO-MCNC: 150 MG/DL (ref 65–100)
GLUCOSE BLD STRIP.AUTO-MCNC: 183 MG/DL (ref 65–100)
GLUCOSE BLD STRIP.AUTO-MCNC: 217 MG/DL (ref 65–100)
GLUCOSE BLD STRIP.AUTO-MCNC: 233 MG/DL (ref 65–100)
GLUCOSE SERPL-MCNC: 163 MG/DL (ref 65–100)
HBA1C MFR BLD: 7 % (ref 4.2–6.3)
POTASSIUM SERPL-SCNC: 4.2 MMOL/L (ref 3.5–5.1)
SERVICE CMNT-IMP: ABNORMAL
SODIUM SERPL-SCNC: 135 MMOL/L (ref 136–145)

## 2017-10-21 PROCEDURE — 97116 GAIT TRAINING THERAPY: CPT

## 2017-10-21 PROCEDURE — 97161 PT EVAL LOW COMPLEX 20 MIN: CPT

## 2017-10-21 PROCEDURE — 80048 BASIC METABOLIC PNL TOTAL CA: CPT | Performed by: NEUROLOGICAL SURGERY

## 2017-10-21 PROCEDURE — 74011250637 HC RX REV CODE- 250/637: Performed by: HOSPITALIST

## 2017-10-21 PROCEDURE — 74011250637 HC RX REV CODE- 250/637: Performed by: NEUROLOGICAL SURGERY

## 2017-10-21 PROCEDURE — 83036 HEMOGLOBIN GLYCOSYLATED A1C: CPT | Performed by: HOSPITALIST

## 2017-10-21 PROCEDURE — G8988 SELF CARE GOAL STATUS: HCPCS

## 2017-10-21 PROCEDURE — G8987 SELF CARE CURRENT STATUS: HCPCS

## 2017-10-21 PROCEDURE — 36415 COLL VENOUS BLD VENIPUNCTURE: CPT | Performed by: NEUROLOGICAL SURGERY

## 2017-10-21 PROCEDURE — 74011250636 HC RX REV CODE- 250/636: Performed by: NEUROLOGICAL SURGERY

## 2017-10-21 PROCEDURE — 82962 GLUCOSE BLOOD TEST: CPT

## 2017-10-21 PROCEDURE — 97165 OT EVAL LOW COMPLEX 30 MIN: CPT

## 2017-10-21 PROCEDURE — 51798 US URINE CAPACITY MEASURE: CPT

## 2017-10-21 PROCEDURE — 74011636637 HC RX REV CODE- 636/637: Performed by: HOSPITALIST

## 2017-10-21 PROCEDURE — 74011636637 HC RX REV CODE- 636/637: Performed by: NURSE PRACTITIONER

## 2017-10-21 PROCEDURE — 97535 SELF CARE MNGMENT TRAINING: CPT

## 2017-10-21 PROCEDURE — 74011250637 HC RX REV CODE- 250/637: Performed by: NURSE PRACTITIONER

## 2017-10-21 PROCEDURE — 65660000000 HC RM CCU STEPDOWN

## 2017-10-21 RX ORDER — DEXTROSE 50 % IN WATER (D50W) INTRAVENOUS SYRINGE
12.5-25 AS NEEDED
Status: DISCONTINUED | OUTPATIENT
Start: 2017-10-21 | End: 2017-10-23 | Stop reason: HOSPADM

## 2017-10-21 RX ORDER — INSULIN LISPRO 100 [IU]/ML
INJECTION, SOLUTION INTRAVENOUS; SUBCUTANEOUS
Status: DISCONTINUED | OUTPATIENT
Start: 2017-10-21 | End: 2017-10-23 | Stop reason: HOSPADM

## 2017-10-21 RX ORDER — DEXAMETHASONE 4 MG/1
4 TABLET ORAL EVERY 12 HOURS
Status: DISCONTINUED | OUTPATIENT
Start: 2017-10-21 | End: 2017-10-23

## 2017-10-21 RX ORDER — MAGNESIUM SULFATE 100 %
4 CRYSTALS MISCELLANEOUS AS NEEDED
Status: DISCONTINUED | OUTPATIENT
Start: 2017-10-21 | End: 2017-10-21 | Stop reason: SDUPTHER

## 2017-10-21 RX ORDER — INSULIN GLARGINE 100 [IU]/ML
4 INJECTION, SOLUTION SUBCUTANEOUS DAILY
Status: DISCONTINUED | OUTPATIENT
Start: 2017-10-21 | End: 2017-10-21

## 2017-10-21 RX ORDER — INSULIN GLARGINE 100 [IU]/ML
8 INJECTION, SOLUTION SUBCUTANEOUS DAILY
Status: DISCONTINUED | OUTPATIENT
Start: 2017-10-22 | End: 2017-10-22

## 2017-10-21 RX ORDER — INSULIN GLARGINE 100 [IU]/ML
4 INJECTION, SOLUTION SUBCUTANEOUS ONCE
Status: COMPLETED | OUTPATIENT
Start: 2017-10-21 | End: 2017-10-21

## 2017-10-21 RX ADMIN — DEXAMETHASONE SODIUM PHOSPHATE 4 MG: 4 INJECTION INTRA-ARTICULAR; INTRALESIONAL; INTRAMUSCULAR; INTRAVENOUS; SOFT TISSUE at 01:00

## 2017-10-21 RX ADMIN — METFORMIN HYDROCHLORIDE 750 MG: 750 TABLET, EXTENDED RELEASE ORAL at 19:05

## 2017-10-21 RX ADMIN — HYDROCODONE BITARTRATE AND ACETAMINOPHEN 1 TABLET: 5; 325 TABLET ORAL at 03:14

## 2017-10-21 RX ADMIN — HYDROCODONE BITARTRATE AND ACETAMINOPHEN 1 TABLET: 5; 325 TABLET ORAL at 21:51

## 2017-10-21 RX ADMIN — Medication 10 ML: at 13:06

## 2017-10-21 RX ADMIN — HYDROCODONE BITARTRATE AND ACETAMINOPHEN 1 TABLET: 5; 325 TABLET ORAL at 12:56

## 2017-10-21 RX ADMIN — PRAVASTATIN SODIUM 40 MG: 40 TABLET ORAL at 21:51

## 2017-10-21 RX ADMIN — STANDARDIZED SENNA CONCENTRATE AND DOCUSATE SODIUM 1 TABLET: 8.6; 5 TABLET, FILM COATED ORAL at 08:39

## 2017-10-21 RX ADMIN — LACTULOSE 30 G: 20 SOLUTION ORAL at 08:39

## 2017-10-21 RX ADMIN — ALPRAZOLAM 0.25 MG: 0.25 TABLET ORAL at 10:55

## 2017-10-21 RX ADMIN — INSULIN LISPRO 3 UNITS: 100 INJECTION, SOLUTION INTRAVENOUS; SUBCUTANEOUS at 17:23

## 2017-10-21 RX ADMIN — HYDROCODONE BITARTRATE AND ACETAMINOPHEN 1 TABLET: 5; 325 TABLET ORAL at 07:21

## 2017-10-21 RX ADMIN — INSULIN LISPRO 3 UNITS: 100 INJECTION, SOLUTION INTRAVENOUS; SUBCUTANEOUS at 12:55

## 2017-10-21 RX ADMIN — DEXAMETHASONE 4 MG: 4 TABLET ORAL at 21:51

## 2017-10-21 RX ADMIN — LACTULOSE 30 G: 20 SOLUTION ORAL at 17:24

## 2017-10-21 RX ADMIN — INSULIN GLARGINE 4 UNITS: 100 INJECTION, SOLUTION SUBCUTANEOUS at 19:17

## 2017-10-21 RX ADMIN — LEVOTHYROXINE SODIUM 75 MCG: 75 TABLET ORAL at 07:21

## 2017-10-21 RX ADMIN — HYDROCODONE BITARTRATE AND ACETAMINOPHEN 1 TABLET: 5; 325 TABLET ORAL at 17:24

## 2017-10-21 RX ADMIN — METOPROLOL SUCCINATE 50 MG: 50 TABLET, EXTENDED RELEASE ORAL at 21:51

## 2017-10-21 RX ADMIN — INSULIN GLARGINE 4 UNITS: 100 INJECTION, SOLUTION SUBCUTANEOUS at 10:55

## 2017-10-21 RX ADMIN — Medication 10 ML: at 06:00

## 2017-10-21 RX ADMIN — ALPRAZOLAM 0.25 MG: 0.25 TABLET ORAL at 21:51

## 2017-10-21 RX ADMIN — ALPRAZOLAM 0.25 MG: 0.25 TABLET ORAL at 01:00

## 2017-10-21 RX ADMIN — INSULIN LISPRO 2 UNITS: 100 INJECTION, SOLUTION INTRAVENOUS; SUBCUTANEOUS at 08:38

## 2017-10-21 RX ADMIN — Medication 10 ML: at 21:54

## 2017-10-21 RX ADMIN — DEXAMETHASONE 4 MG: 4 TABLET ORAL at 08:39

## 2017-10-21 RX ADMIN — PANTOPRAZOLE SODIUM 40 MG: 40 TABLET, DELAYED RELEASE ORAL at 07:21

## 2017-10-21 NOTE — PROGRESS NOTES
Labs reviewed  Na stable  Cont current care  Daily Na levels for now    Will follow Na over the weekend    Call with any questions          Greer Francis, [address] [facility]   [address], [place] , [facility]  Phone - [phone]   Fax - [phone]  [email or web address]

## 2017-10-21 NOTE — PROGRESS NOTES
Problem: Falls - Risk of  Goal: *Absence of Falls  Document Tamara Fall Risk and appropriate interventions in the flowsheet.    Outcome: Progressing Towards Goal  Fall Risk Interventions:  Mobility Interventions: Assess mobility with egress test, Communicate number of staff needed for ambulation/transfer, OT consult for ADLs, Patient to call before getting OOB, PT Consult for mobility concerns, PT Consult for assist device competence, Strengthening exercises (ROM-active/passive), Utilize walker, cane, or other assitive device, Utilize gait belt for transfers/ambulation         Medication Interventions: Assess postural VS orthostatic hypotension, Evaluate medications/consider consulting pharmacy, Patient to call before getting OOB, Teach patient to arise slowly, Utilize gait belt for transfers/ambulation    Elimination Interventions: Call light in reach, Patient to call for help with toileting needs, Toilet paper/wipes in reach, Toileting schedule/hourly rounds

## 2017-10-21 NOTE — PROGRESS NOTES
Problem: Falls - Risk of  Goal: *Absence of Falls  Document Tamara Fall Risk and appropriate interventions in the flowsheet.    Outcome: Progressing Towards Goal  Fall Risk Interventions:            Medication Interventions: Assess postural VS orthostatic hypotension, Evaluate medications/consider consulting pharmacy, Teach patient to arise slowly, Patient to call before getting OOB    Elimination Interventions: Call light in reach, Patient to call for help with toileting needs

## 2017-10-21 NOTE — PROGRESS NOTES
Problem: Self Care Deficits Care Plan (Adult)  Goal: *Acute Goals and Plan of Care (Insert Text)  Occupational Therapy Goals  Initiated 10/21/2017    1. Patient will perform grooming in standing with modified independence within 7 days. 2.  Patient will perform bathing with supervision/set-up using most appropriate DME within 7 days. 3.  Patient will upper body dressing and lower body dressing at supervision/set-up within 7 days. 4.  Patient will demonstrate understanding of back precautions and infection control within 7 days. 5.  Patient will verbalize/demonstrate 3/3 back precautions during ADL tasks without cues within 7 days. Occupational Therapy EVALUATION  Patient: Briana Boland (4 y.o. 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 30, 2017. Patient trained in [address] precautions which she was able to recall 1/3 without vc; 3/3 by end of session. Patient required min/mod A self care skills and functional mobility, limited more by impulsivity than pain which was 1/10. Patient appears cognitively intact and daughter was present for training. VSS this session with all activity. 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 Patient will benefit from skilled intervention to address the above impairments.   Patients rehabilitation potential is considered to be Good  Factors which may influence rehabilitation potential include:   [x] None noted  []             Mental ability/status  []             Medical condition  []             Home/family situation and support systems  []             Safety awareness  []             Pain tolerance/management  []             Other:      PLAN :  Recommendations and Planned Interventions:  [x]               Self Care Training                  [x]        Therapeutic Activities  [x]               Functional Mobility Training    []        Cognitive Retraining  [x]               Therapeutic Exercises           [x]        Endurance Activities  [x]               Balance Training                   []        Neuromuscular Re-Education  []               Visual/Perceptual Training     [x]   Home Safety Training  [x]               Patient Education                 [x]        Family Training/Education  []               Other (comment):    Frequency/Duration: Patient will be followed by occupational therapy 5 times a week to address goals. Discharge Recommendations: Home Health  Further Equipment Recommendations for Discharge: Versa Frame, shower chair     SUBJECTIVE:   Patient stated I am a more independent kind of person; I will want to do things for myself but I want to be safe; I think I will do better at home if someone comes to show me what to do there.  (daughter concurs)    OBJECTIVE DATA SUMMARY:   HISTORY:   Past Medical History:   Diagnosis Date    Anxiety     Diabetes ([facility] [address]     type 2; NIDDM    GERD (gastroesophageal reflux disease)     Hypercholesteremia     Hypertension     Hypothyroidism    History reviewed. No pertinent surgical history.     Prior Level of Function/Home Situation: I PTA before Sept 30, 2017; full time employment as seamtress  Expanded or extensive additional review of patient history:     Home Situation  Home Environment: Private residence  # Steps to Enter: 4  Rails to Enter: Yes  One/Two Story Residence: One story  Living Alone: No  Support Systems: Child(tony), Spouse/Significant Other/Partner (daughter present for training)  Patient Expects to be Discharged to[de-identified] Private residence  Current DME Used/Available at Home: Blood pressure cuff  Tub or Shower Type: Shower  Versa frame ordered by daughter this session  [x]  Right hand dominant   []  Left hand dominant    EXAMINATION OF PERFORMANCE DEFICITS:  Cognitive/Behavioral Status:  Neurologic State: Alert                   Skin: wound covered    Edema: none B LEs    Hearing: Auditory  Auditory Impairment: None  Hearing Aids/Status: Does not own    Vision/Perceptual:                           Acuity: Within Defined Limits         Range of Motion:  B UE  AROM: Generally decreased, functional  PROM: Within functional limits                      Strength:  B UE  Strength: Generally decreased, functional (within limits of 5# back precautions)                Coordination:  Coordination: Generally decreased, functional  Fine Motor Skills-Upper: Left Intact; Right Intact    Gross Motor Skills-Upper: Left Intact; Right Intact (with cues needed to maintain back precautions with )    Tone & Sensation:    Tone: Normal  Sensation: Impaired (c/o right thigh numbness, residual from surg but improved)                      Balance:  Sitting: Intact  Standing: Intact    Functional Mobility and Transfers for ADLs:  Bed Mobility:  Rolling: Moderate assistance  Supine to Sit: Moderate assistance; Additional time  Sit to Supine: Moderate assistance; Additional time  Scooting: Minimum assistance; Additional time    Transfers:  Sit to Stand: Minimum assistance  Stand to Sit: Contact guard assistance  Bed to Chair: Minimum assistance  Toilet Transfer : Minimum assistance; Moderate assistance; Additional time (will benefit from Versa frame)    ADL Assessment:  Feeding: Supervision (cues for precautions during meals)    Oral Facial Hygiene/Grooming: Supervision; Additional time (training for grooming with back precautions anel oral care)    Bathing: Minimum assistance; Moderate assistance    Upper Body Dressing: Minimum assistance    Lower Body Dressing: Moderate assistance; Additional time (may benefit from LE AE)    Toileting: Minimum assistance (initiated safety training in bathroom)                ADL Intervention and task modifications:        initiated training with patient and daughter regarding bathroom safety, back precautions with dressing, bathing toileting, in/out of bed, shower and on/off toilet. Initiated training of fall prevention, infection control, energy conservation and back precautions. Therapeutic Exercise:  Trained patient that walking is best exercise post lum cardoza  Functional Measure:  . Barthel Index:    Bathin  Bladder: 0  Bowels: 5  Groomin  Dressin  Feeding: 10  Mobility: 0  Stairs: 0  Toilet Use: 5  Transfer (Bed to Chair and Back): 10  Total: 35       Barthel and G-code impairment scale:  Percentage of impairment CH  0% CI  1-19% CJ  20-39% CK  40-59% CL  60-79% CM  80-99% CN  100%   Barthel Score 0-100 100 99-80 79-60 59-40 20-39 1-19   0   Barthel Score 0-20 20 17-19 13-16 9-12 5-8 1-4 0      The Barthel ADL Index: Guidelines  1. The index should be used as a record of what a patient does, not as a record of what a patient could do. 2. The main aim is to establish degree of independence from any help, physical or verbal, however minor and for whatever reason. 3. The need for supervision renders the patient not independent. 4. A patient's performance should be established using the best available evidence. Asking the patient, friends/relatives and nurses are the usual sources, but direct observation and common sense are also important. However direct testing is not needed. 5. Usually the patient's performance over the preceding 24-48 hours is important, but occasionally longer periods will be relevant. 6. Middle categories imply that the patient supplies over 50 per cent of the effort.   7. Use of aids to be independent is allowed. Chicho Fischer., Barthel, D.W. ([id number]). Functional evaluation: the Barthel Index. [address] (142. ROGER Chapin, [place]., Edmar Nava., Kiera Holguinbernice, [address] (1999). Measuring the change indisability after inpatient rehabilitation; comparison of the responsiveness of the Barthel Index and Functional [place] Measure. Journal of Neurology, Neurosurgery, and Psychiatry, 66(4), 704-192. DEMETRI Curry, FRANK Andrews, & Siomara West M.A. (2004.) Assessment of post-stroke quality of life in cost-effectiveness studies: The usefulness of the Barthel Index and the EuroQoL-5D. Quality of Life Research, 13, 037-70       G codes: In compliance with CMSs Claims Based Outcome Reporting, the following G-code set was chosen for this patient based on their primary functional limitation being treated: The outcome measure chosen to determine the severity of the functional limitation was the Barthel Index  with a score of 90/100 which was correlated with the impairment scale. ?  Self Care:     - CURRENT STATUS: CL - 60%-79% impaired, limited or restricted    - GOAL STATUS: CI - 1%-19% impaired, limited or restricted    - D/C STATUS:  ---------------To be determined---------------   Occupational Therapy Evaluation Charge Determination   History Examination Decision-Making   LOW Complexity : Brief history review  LOW Complexity : 1-3 performance deficits relating to physical, cognitive , or psychosocial skils that result in activity limitations and / or participation restrictions  LOW Complexity : No comorbidities that affect functional and no verbal or physical assistance needed to complete eval tasks       Based on the above components, the patient evaluation is determined to be of the following complexity level: LOW   Pain:  Pain Scale 1: Numeric (0 - 10)  Pain Intensity 1: 1  Pain Location 1: Back  Pain Orientation 1: Mid  Pain Description 1: Aching  Pain Intervention(s) 1: Medication (see MAR)  Activity Tolerance:   fair  Please refer to the flowsheet for vital signs taken during this treatment. After treatment:   [x] Patient left in no apparent distress sitting up in chair  [] Patient left in no apparent distress in bed  [x] Call bell left within reach  [x] Nursing notified  [x] Caregiver present  [] Bed alarm activated    COMMUNICATION/EDUCATION:   The patients plan of care was discussed with: Physical Therapist and Registered Nurse. [x] Home safety education was provided and the patient/caregiver indicated understanding. [x] Patient/family have participated as able in goal setting and plan of care. [x] Patient/family agree to work toward stated goals and plan of care. [] Patient understands intent and goals of therapy, but is neutral about his/her participation. [] Patient is unable to participate in goal setting and plan of care. This patients plan of care is appropriate for delegation to \[facility] [place] [facility]\"".     Thank you for this referral.  Chandler Waters OTR/L  Time Calculation: 44 mins

## 2017-10-21 NOTE — PROGRESS NOTES
Problem: Mobility Impaired (Adult and Pediatric)  Goal: *Acute Goals and Plan of Care (Insert Text)  Physical Therapy Goals  Initiated 10/21/2017    1. Patient will move from supine to sit and sit to supine  in bed with modified independence within 4 days. 2. Patient will perform sit to stand with modified independence within 4 days. 3. Patient will ambulate with independence for 300 feet with the least restrictive device within 4 days. 4. Patient will ascend/descend 3 stairs with 1 handrail(s) with modified independence within 4 days. 5. Patient will verbalize and demonstrate understanding of spinal precautions (No bending, lifting greater than 5 lbs, or twisting; log-roll technique; frequent repositioning as instructed) within 4 days. physical Therapy EVALUATION  Patient: Johnny Montana (43 y.o. 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 The patient lives with family and her daughter was present throughout the session for education and to answer questions. She mobilized well for her first time up with stable vitals and well controlled pain. Educated the patient and her daughter regarding back precautions and activity progression at home. Anticipate great progress towards goals and likely discharge home with family support and HHPT vs no follow up therapy services. Patient will benefit from skilled intervention to address the above impairments.   Patients rehabilitation potential is considered to be Good  Factors which may influence rehabilitation potential include:   [x]         None noted  [] Mental ability/status  []         Medical condition  []         Home/family situation and support systems  []         Safety awareness  []         Pain tolerance/management  []         Other:      PLAN :  Recommendations and Planned Interventions:  [x]           Bed Mobility Training             [x]    Neuromuscular Re-Education  [x]           Transfer Training                   []    Orthotic/Prosthetic Training  [x]           Gait Training                         []    Modalities  [x]           Therapeutic Exercises           []    Edema Management/Control  [x]           Therapeutic Activities            []    Patient and Family Training/Education  []           Other (comment):    Frequency/Duration: Patient will be followed by physical therapy  daily to address goals. Discharge Recommendations: Home Health and None  Further Equipment Recommendations for Discharge: None     SUBJECTIVE:   Patient stated I feel good. I can do more.     OBJECTIVE DATA SUMMARY:   HISTORY:    Past Medical History:   Diagnosis Date    Anxiety     Diabetes ([facility] [address]     type 2; NIDDM    GERD (gastroesophageal reflux disease)     Hypercholesteremia     Hypertension     Hypothyroidism    History reviewed. No pertinent surgical history. Prior Level of Function/Home Situation: independent and active, working full time  Personal factors and/or comorbidities impacting plan of care:     Home Situation  Home Environment: Private residence  # Steps to Enter: 4  Rails to Enter: Yes  One/Two Story Residence: One story  Living Alone: No  Support Systems: Child(tony), Spouse/Significant Other/Partner, Family member(s)  Patient Expects to be Discharged to[de-identified] Private residence  Current DME Used/Available at Home: Blood pressure cuff  Tub or Shower Type: Shower    EXAMINATION/PRESENTATION/DECISION MAKING:   Critical Behavior:  Neurologic State: Alert  Orientation Level: Oriented X4  Cognition: Follows commands     Hearing:   Auditory  Auditory Impairment: None  Hearing Aids/Status: Does not own  Skin:  Dressing clean, dry, and intact    Range Of Motion:  AROM: Generally decreased, functional                       Strength:    Strength: Within functional limits                    Tone & Sensation:   Tone: Normal              Sensation: Impaired (c/o right thigh numbness, residual from surg but improved)               Coordination:     Vision:      Functional Mobility:  Bed Mobility:  Rolling: Moderate assistance  Supine to Sit: Moderate assistance; Additional time        Transfers:  Sit to Stand: Minimum assistance  Stand to Sit: Contact guard assistance                       Balance:   Sitting: Intact  Standing: Intact  Ambulation/Gait Training:  Distance (ft): 150 Feet (ft)  Assistive Device: Gait belt  Ambulation - Level of Assistance: Contact guard assistance     Gait Description (WDL): Exceptions to WDL  Gait Abnormalities: Decreased step clearance        Base of Support: Narrowed     Speed/Jeanne: Slow  Step Length: Right shortened;Left shortened                          Physical Therapy Evaluation Charge Determination   History Examination Presentation Decision-Making   LOW Complexity : Zero comorbidities / personal factors that will impact the outcome / POC LOW Complexity : 1-2 Standardized tests and measures addressing body structure, function, activity limitation and / or participation in recreation  LOW Complexity : Stable, uncomplicated  LOW Complexity : FOTO score of       Based on the above components, the patient evaluation is determined to be of the following complexity level: LOW     Pain:  Pain Scale 1: Numeric (0 - 10)  Pain Intensity 1: 0  Pain Location 1: Back  Pain Orientation 1: Mid  Pain Description 1: Aching  Pain Intervention(s) 1: Medication (see MAR)  Activity Tolerance:     Please refer to the flowsheet for vital signs taken during this treatment.   After treatment:   [x]         Patient left in no apparent distress standing with OT  []         Patient left in no apparent distress in bed  [x]         Call bell left within reach  [x]         Nursing notified  [x]         Caregiver present  []         Bed alarm activated    COMMUNICATION/EDUCATION:   The patients plan of care was discussed with: Registered Nurse. [x]         Fall prevention education was provided and the patient/caregiver indicated understanding. [x]         Patient/family have participated as able in goal setting and plan of care. [x]         Patient/family agree to work toward stated goals and plan of care. []         Patient understands intent and goals of therapy, but is neutral about his/her participation. []         Patient is unable to participate in goal setting and plan of care.     Thank you for this referral.  Jorge Anderson, PT, DPT   Time Calculation: 19 mins

## 2017-10-21 NOTE — PROGRESS NOTES
Hospitalist Progress Note  Lisa Salcido NP  Answering service: [phone] OR 36 from in house phone  Cell: [phone]      Date of Service:  10/21/2017  NAME:  Parisa Verdin  :  1955  MRN:  [id number]      Admission Summary:   57year old with HTN, DM, and Hyperlipidemia who is s/p laminectomy and resection of intradural and extramedullary spinal cord tumor. We were consulted to manage her DM in the setting of steroids. Interval history / Subjective:     She looks good this AM and I discussed her DM plan with both her and her daughter     Assessment & Plan:     Type 2 DM in the setting of Hyperglycemia  Higher due to steroids  Metformin  SSI and added low dose of Lantus today    Hypertension  Well controlled on home regimen     Hyperlipidemia  Pravastatin    S/p T11-L1 Laminectomy and resection of Spinal cord Tumor  Per primary team    Code status: full  DVT prophylaxis: per primary team    Care Plan discussed with: Patient/Family and Nurse  Disposition: TBD     Hospital Problems  Date Reviewed: 10/19/2017          Codes Class Noted POA    Intradural extramedullary spinal tumor ICD-10-CM: D49.7  ICD-9-CM: 239.7  10/19/2017 Unknown                Review of Systems:   A comprehensive review of systems was negative except for that written in the HPI. Vital Signs:    Last 24hrs VS reviewed since prior progress note.  Most recent are:  Visit Vitals    /63 (BP 1 Location: Right arm, BP Patient Position: At rest)    Pulse 71    Temp 97.8 °F (36.6 °C)    Resp 19    Ht 5' 3\" (1.6 m)    Wt 68.4 kg (150 lb 12.7 oz)    SpO2 97%    BMI 26.71 kg/m2         Intake/Output Summary (Last 24 hours) at 10/21/17 0826  Last data filed at 10/20/17 1900   Gross per 24 hour   Intake              750 ml   Output             2100 ml   Net            -1350 ml        Physical Examination:             Constitutional:  No acute distress, cooperative, pleasant    ENT:  Oral mucous moist, oropharynx benign. Neck supple,    Resp:  CTA bilaterally. No wheezing/rhonchi/rales. No accessory muscle use   CV:  Regular rhythm, normal rate, no murmurs, gallops, rubs    GI:  Soft, non distended, non tender. normoactive bowel sounds, no hepatosplenomegaly     Musculoskeletal:  No edema, warm, 2+ pulses throughout    Neurologic:  Moves all extremities. AAOx3,     Psych:  Good insight, Not anxious nor agitated. Skin:  Good turgor, no rashes or ulcers, dressing in place on back       Data Review:    Review and/or order of clinical lab test      Labs:     Recent Labs      10/20/17   0257   WBC  10.4   HGB  12.9   HCT  38.7   PLT  298     Recent Labs      10/21/17   0713  10/20/17   0257   NA  135*  134*   K  4.2  4.6   CL  100  99   CO2  27  30   BUN  17  14   CREA  0.55  0.55   GLU  163*  170*   CA  8.1*  7.8*     No results for input(s): SGOT, GPT, ALT, AP, TBIL, TBILI, TP, ALB, GLOB, GGT, AML, LPSE in the last 72 hours. No lab exists for component: AMYP, HLPSE  No results for input(s): INR, PTP, APTT in the last 72 hours. No lab exists for component: INREXT   No results for input(s): FE, TIBC, PSAT, FERR in the last 72 hours. No results found for: FOL, RBCF   No results for input(s): PH, PCO2, PO2 in the last 72 hours. No results for input(s): CPK, CKNDX, TROIQ in the last 72 hours.     No lab exists for component: CPKMB  No results found for: CHOL, CHOLX, CHLST, CHOLV, HDL, LDL, LDLC, DLDLP, TGLX, TRIGL, TRIGP, CHHD, CHHDX  Lab Results   Component Value Date/Time    Glucose (POC) 150 10/21/2017 07:39 AM    Glucose (POC) 254 10/20/2017 10:11 PM    Glucose (POC) 268 10/20/2017 05:16 PM    Glucose (POC) 144 10/20/2017 11:49 AM    Glucose (POC) 139 10/20/2017 05:29 AM     Lab Results   Component Value Date/Time    Color YELLOW/STRAW 10/10/2017 07:11 PM    Appearance CLEAR 10/10/2017 07:11 PM    Specific gravity 1.022 10/10/2017 07:11 PM    pH (UA) 6.5 10/10/2017 07:11 PM    Protein NEGATIVE  10/10/2017 07:11 PM    Glucose 100 10/10/2017 07:11 PM    Ketone NEGATIVE  10/10/2017 07:11 PM    Bilirubin NEGATIVE  10/10/2017 07:11 PM    Urobilinogen 0.2 10/10/2017 07:11 PM    Nitrites NEGATIVE  10/10/2017 07:11 PM    Leukocyte Esterase NEGATIVE  10/10/2017 07:11 PM    Epithelial cells FEW 10/10/2017 07:11 PM    Bacteria NEGATIVE  10/10/2017 07:11 PM    WBC 0-4 10/10/2017 07:11 PM    RBC 0-5 10/10/2017 07:11 PM         Medications Reviewed:     Current Facility-Administered Medications   Medication Dose Route Frequency    dexamethasone (DECADRON) tablet 4 mg  4 mg Oral Q12H    insulin glargine (LANTUS) injection 4 Units  4 Units SubCUTAneous DAILY    metFORMIN ER (GLUCOPHAGE XR) tablet 750 mg  750 mg Oral DAILY WITH DINNER    insulin lispro (HUMALOG) injection   SubCUTAneous AC&HS    HYDROcodone-acetaminophen (NORCO) 5-325 mg per tablet 1 Tab  1 Tab Oral Q4H PRN    lactulose (CHRONULAC) solution 30 g  30 g Oral BID    acetaminophen (TYLENOL) tablet 650 mg  650 mg Oral Q4H PRN    ALPRAZolam (XANAX) tablet 0.125-0.25 mg  0.125-0.25 mg Oral BID PRN    levothyroxine (SYNTHROID) tablet 75 mcg  75 mcg Oral ACB    metoprolol succinate (TOPROL-XL) XL tablet 50 mg  50 mg Oral QHS    pravastatin (PRAVACHOL) tablet 40 mg  40 mg Oral QHS    sodium chloride (NS) flush 5-10 mL  5-10 mL IntraVENous Q8H    sodium chloride (NS) flush 5-10 mL  5-10 mL IntraVENous PRN    naloxone (NARCAN) injection 0.4 mg  0.4 mg IntraVENous PRN    diphenhydrAMINE (BENADRYL) capsule 25 mg  25 mg Oral Q6H PRN    senna-docusate (PERICOLACE) 8.6-50 mg per tablet 1 Tab  1 Tab Oral DAILY    bisacodyl (DULCOLAX) suppository 10 mg  10 mg Rectal DAILY PRN    glucose chewable tablet 16 g  4 Tab Oral PRN    dextrose (D50W) injection syrg 12.5-25 g  12.5-25 g IntraVENous PRN    glucagon (GLUCAGEN) injection 1 mg  1 mg IntraMUSCular PRN    pantoprazole (PROTONIX) tablet 40 mg  40 mg Oral ACB ______________________________________________________________________  EXPECTED LENGTH OF STAY: 2d 12h  ACTUAL LENGTH OF STAY:          2                 Valeriy Daniels NP       I have seen and examined Sandi Sanz a 57 y.o. female independently.   Please the detailed documentation by the NP,I agree with the plan as outlined by the ABBY Abdi   Signed by: William Pedroza MD

## 2017-10-21 NOTE — ROUTINE PROCESS
Bedside shift change report given to Lopez Drew RN (oncoming nurse) by Jax Gomez RN (offgoing nurse). Report included the following information SBAR, Kardex, Procedure Summary, Intake/Output, MAR, Accordion and Cardiac Rhythm NSR.

## 2017-10-21 NOTE — PROGRESS NOTES
Bedside shift change report given to ABDIAZIZ Pitt (oncoming nurse) by Dony Haywood (offgoing nurse). Report included the following information SBAR, Kardex, Procedure Summary, Intake/Output, MAR, Recent Results and Cardiac Rhythm NSR.

## 2017-10-21 NOTE — PROGRESS NOTES
Bedside and Verbal shift change report given to ABDIAZIZ Harrison (oncoming nurse) by Paty Suarez (offgoing nurse). Report included the following information SBAR, Kardex, ED Summary, Procedure Summary, Recent Results and Cardiac Rhythm NSR.

## 2017-10-22 LAB
ANION GAP SERPL CALC-SCNC: 10 MMOL/L (ref 5–15)
ANION GAP SERPL CALC-SCNC: 3 MMOL/L (ref 5–15)
ATRIAL RATE: 69 BPM
BUN SERPL-MCNC: 16 MG/DL (ref 6–20)
BUN SERPL-MCNC: 17 MG/DL (ref 6–20)
BUN/CREAT SERPL: 23 (ref 12–20)
BUN/CREAT SERPL: 29 (ref 12–20)
CALCIUM SERPL-MCNC: 8.2 MG/DL (ref 8.5–10.1)
CALCIUM SERPL-MCNC: 9 MG/DL (ref 8.5–10.1)
CALCULATED P AXIS, ECG09: 72 DEGREES
CALCULATED R AXIS, ECG10: 63 DEGREES
CALCULATED T AXIS, ECG11: 48 DEGREES
CHLORIDE SERPL-SCNC: 100 MMOL/L (ref 97–108)
CHLORIDE SERPL-SCNC: 96 MMOL/L (ref 97–108)
CO2 SERPL-SCNC: 21 MMOL/L (ref 21–32)
CO2 SERPL-SCNC: 25 MMOL/L (ref 21–32)
CREAT SERPL-MCNC: 0.56 MG/DL (ref 0.55–1.02)
CREAT SERPL-MCNC: 0.73 MG/DL (ref 0.55–1.02)
DIAGNOSIS, 93000: NORMAL
GLUCOSE BLD STRIP.AUTO-MCNC: 155 MG/DL (ref 65–100)
GLUCOSE BLD STRIP.AUTO-MCNC: 189 MG/DL (ref 65–100)
GLUCOSE BLD STRIP.AUTO-MCNC: 242 MG/DL (ref 65–100)
GLUCOSE BLD STRIP.AUTO-MCNC: 248 MG/DL (ref 65–100)
GLUCOSE SERPL-MCNC: 167 MG/DL (ref 65–100)
GLUCOSE SERPL-MCNC: 257 MG/DL (ref 65–100)
P-R INTERVAL, ECG05: 136 MS
POTASSIUM SERPL-SCNC: 4.7 MMOL/L (ref 3.5–5.1)
POTASSIUM SERPL-SCNC: ABNORMAL MMOL/L (ref 3.5–5.1)
Q-T INTERVAL, ECG07: 402 MS
QRS DURATION, ECG06: 80 MS
QTC CALCULATION (BEZET), ECG08: 430 MS
SERVICE CMNT-IMP: ABNORMAL
SODIUM SERPL-SCNC: 124 MMOL/L (ref 136–145)
SODIUM SERPL-SCNC: 131 MMOL/L (ref 136–145)
VENTRICULAR RATE, ECG03: 69 BPM

## 2017-10-22 PROCEDURE — 74011250636 HC RX REV CODE- 250/636: Performed by: NEUROLOGICAL SURGERY

## 2017-10-22 PROCEDURE — 74011636637 HC RX REV CODE- 636/637: Performed by: NURSE PRACTITIONER

## 2017-10-22 PROCEDURE — 74011250637 HC RX REV CODE- 250/637: Performed by: NURSE PRACTITIONER

## 2017-10-22 PROCEDURE — 36415 COLL VENOUS BLD VENIPUNCTURE: CPT | Performed by: INTERNAL MEDICINE

## 2017-10-22 PROCEDURE — 74011250637 HC RX REV CODE- 250/637: Performed by: HOSPITALIST

## 2017-10-22 PROCEDURE — 97116 GAIT TRAINING THERAPY: CPT

## 2017-10-22 PROCEDURE — 80048 BASIC METABOLIC PNL TOTAL CA: CPT | Performed by: INTERNAL MEDICINE

## 2017-10-22 PROCEDURE — 74011250637 HC RX REV CODE- 250/637: Performed by: INTERNAL MEDICINE

## 2017-10-22 PROCEDURE — 65660000000 HC RM CCU STEPDOWN

## 2017-10-22 PROCEDURE — 74011250637 HC RX REV CODE- 250/637: Performed by: NEUROLOGICAL SURGERY

## 2017-10-22 PROCEDURE — 82962 GLUCOSE BLOOD TEST: CPT

## 2017-10-22 RX ORDER — FACIAL-BODY WIPES
10 EACH TOPICAL DAILY PRN
Status: DISCONTINUED | OUTPATIENT
Start: 2017-10-22 | End: 2017-10-23 | Stop reason: HOSPADM

## 2017-10-22 RX ORDER — INSULIN LISPRO 100 [IU]/ML
7 INJECTION, SOLUTION INTRAVENOUS; SUBCUTANEOUS ONCE
Status: COMPLETED | OUTPATIENT
Start: 2017-10-22 | End: 2017-10-22

## 2017-10-22 RX ORDER — ALPRAZOLAM 0.5 MG/1
0.5 TABLET ORAL
Status: DISCONTINUED | OUTPATIENT
Start: 2017-10-22 | End: 2017-10-23 | Stop reason: HOSPADM

## 2017-10-22 RX ORDER — SODIUM CHLORIDE TAB 1 GM 1 G
1 TAB MISCELLANEOUS
Status: DISCONTINUED | OUTPATIENT
Start: 2017-10-22 | End: 2017-10-23 | Stop reason: HOSPADM

## 2017-10-22 RX ORDER — INSULIN GLARGINE 100 [IU]/ML
10 INJECTION, SOLUTION SUBCUTANEOUS DAILY
Status: DISCONTINUED | OUTPATIENT
Start: 2017-10-23 | End: 2017-10-23 | Stop reason: HOSPADM

## 2017-10-22 RX ADMIN — INSULIN LISPRO 7 UNITS: 100 INJECTION, SOLUTION INTRAVENOUS; SUBCUTANEOUS at 12:23

## 2017-10-22 RX ADMIN — HYDROCODONE BITARTRATE AND ACETAMINOPHEN 1 TABLET: 5; 325 TABLET ORAL at 06:58

## 2017-10-22 RX ADMIN — Medication 10 ML: at 07:02

## 2017-10-22 RX ADMIN — INSULIN LISPRO 3 UNITS: 100 INJECTION, SOLUTION INTRAVENOUS; SUBCUTANEOUS at 17:22

## 2017-10-22 RX ADMIN — SODIUM CHLORIDE TAB 1 GM 1 G: 1 TAB at 12:01

## 2017-10-22 RX ADMIN — STANDARDIZED SENNA CONCENTRATE AND DOCUSATE SODIUM 1 TABLET: 8.6; 5 TABLET, FILM COATED ORAL at 09:02

## 2017-10-22 RX ADMIN — HYDROCODONE BITARTRATE AND ACETAMINOPHEN 1 TABLET: 5; 325 TABLET ORAL at 20:59

## 2017-10-22 RX ADMIN — METOPROLOL SUCCINATE 50 MG: 50 TABLET, EXTENDED RELEASE ORAL at 21:00

## 2017-10-22 RX ADMIN — LACTULOSE 30 G: 20 SOLUTION ORAL at 09:03

## 2017-10-22 RX ADMIN — INSULIN GLARGINE 8 UNITS: 100 INJECTION, SOLUTION SUBCUTANEOUS at 09:02

## 2017-10-22 RX ADMIN — PRAVASTATIN SODIUM 40 MG: 40 TABLET ORAL at 21:00

## 2017-10-22 RX ADMIN — PANTOPRAZOLE SODIUM 40 MG: 40 TABLET, DELAYED RELEASE ORAL at 06:58

## 2017-10-22 RX ADMIN — DIPHENHYDRAMINE HYDROCHLORIDE 25 MG: 25 CAPSULE ORAL at 02:56

## 2017-10-22 RX ADMIN — INSULIN LISPRO 3 UNITS: 100 INJECTION, SOLUTION INTRAVENOUS; SUBCUTANEOUS at 07:24

## 2017-10-22 RX ADMIN — LACTULOSE 30 G: 20 SOLUTION ORAL at 17:22

## 2017-10-22 RX ADMIN — DEXAMETHASONE 4 MG: 4 TABLET ORAL at 09:02

## 2017-10-22 RX ADMIN — HYDROCODONE BITARTRATE AND ACETAMINOPHEN 1 TABLET: 5; 325 TABLET ORAL at 12:01

## 2017-10-22 RX ADMIN — METFORMIN HYDROCHLORIDE 750 MG: 750 TABLET, EXTENDED RELEASE ORAL at 19:51

## 2017-10-22 RX ADMIN — DEXAMETHASONE 4 MG: 4 TABLET ORAL at 20:59

## 2017-10-22 RX ADMIN — SODIUM CHLORIDE TAB 1 GM 1 G: 1 TAB at 17:23

## 2017-10-22 RX ADMIN — HYDROCODONE BITARTRATE AND ACETAMINOPHEN 1 TABLET: 5; 325 TABLET ORAL at 02:56

## 2017-10-22 RX ADMIN — INSULIN LISPRO 4 UNITS: 100 INJECTION, SOLUTION INTRAVENOUS; SUBCUTANEOUS at 12:02

## 2017-10-22 RX ADMIN — LEVOTHYROXINE SODIUM 75 MCG: 75 TABLET ORAL at 06:58

## 2017-10-22 RX ADMIN — Medication 10 ML: at 14:58

## 2017-10-22 RX ADMIN — ALPRAZOLAM 0.5 MG: 0.5 TABLET ORAL at 20:59

## 2017-10-22 RX ADMIN — ALPRAZOLAM 0.25 MG: 0.25 TABLET ORAL at 09:02

## 2017-10-22 RX ADMIN — Medication 10 ML: at 21:02

## 2017-10-22 RX ADMIN — INSULIN LISPRO 2 UNITS: 100 INJECTION, SOLUTION INTRAVENOUS; SUBCUTANEOUS at 21:11

## 2017-10-22 NOTE — PROGRESS NOTES
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 We were consulted to manage her DM in the setting of steroids. Interval history / Subjective:     Ms. Guillermo Ruffin just finished working with PT. She really was hoping to go home. We discussed this and that it is up to primary team. Dr. Cong Torres saw earlier and will have RN follow up     Assessment & Plan:     Type 2 DM in the setting of Hyperglycemia  Higher due to steroids  Metformin  SSI and added low dose of Lantus 4, increased to 8 today  Will likely need something extra at home with steroids    Hypertension  Well controlled on home regimen     Hyperlipidemia  Pravastatin    Hyponatremia  Element of SIADH  Nephrology consulted  Resume Na tabs  Water intake, may need restriction    S/p T11-L1 Laminectomy and resection of Spinal cord Tumor  Per primary team    Code status: full  DVT prophylaxis: per primary team    Care Plan discussed with: Patient/Family and Nurse  Disposition: TBD     Hospital Problems  Date Reviewed: 10/19/2017          Codes Class Noted POA    Intradural extramedullary spinal tumor ICD-10-CM: D49.7  ICD-9-CM: 239.7  10/19/2017 Unknown                Review of Systems:   A comprehensive review of systems was negative except for that written in the HPI. Vital Signs:    Last 24hrs VS reviewed since prior progress note.  Most recent are:  Visit Vitals    /70 (BP 1 Location: Right arm, BP Patient Position: At rest)    Pulse 60    Temp 97 °F (36.1 °C)    Resp 15    Ht 5' 3\" (1.6 m)    Wt 69 kg (152 lb 1.9 oz)    SpO2 98%    BMI 26.95 kg/m2 Intake/Output Summary (Last 24 hours) at 10/22/17 1204  Last data filed at 10/22/17 0800   Gross per 24 hour   Intake              660 ml   Output              750 ml   Net              -90 ml        Physical Examination:             Constitutional:  No acute distress, cooperative, pleasant    ENT:  Oral mucous moist, oropharynx benign. Neck supple,    Resp:  CTA bilaterally. No wheezing/rhonchi/rales. No accessory muscle use   CV:  Regular rhythm, normal rate, no murmurs, gallops, rubs    GI:  Soft, non distended, non tender. normoactive bowel sounds, no hepatosplenomegaly     Musculoskeletal:  No edema, warm, 2+ pulses throughout    Neurologic:  Moves all extremities. AAOx3,     Psych:  Good insight, Not anxious nor agitated. Skin:  Good turgor, no rashes or ulcers, dressing in place on back       Data Review:    Review and/or order of clinical lab test      Labs:     Recent Labs      10/20/17   0257   WBC  10.4   HGB  12.9   HCT  38.7   PLT  298     Recent Labs      10/22/17   0921  10/22/17   0651  10/21/17   0713   NA  131*  124*  135*   K  4.7  HEMOLYZED,RECOLLECT REQUESTED  4.2   CL  96*  100  100   CO2  25  21  27   BUN  17  16  17   CREA  0.73  0.56  0.55   GLU  257*  167*  163*   CA  9.0  8.2*  8.1*     No results for input(s): SGOT, GPT, ALT, AP, TBIL, TBILI, TP, ALB, GLOB, GGT, AML, LPSE in the last 72 hours. No lab exists for component: AMYP, HLPSE  No results for input(s): INR, PTP, APTT in the last 72 hours. No lab exists for component: INREXT, INREXT   No results for input(s): FE, TIBC, PSAT, FERR in the last 72 hours. No results found for: FOL, RBCF   No results for input(s): PH, PCO2, PO2 in the last 72 hours. No results for input(s): CPK, CKNDX, TROIQ in the last 72 hours.     No lab exists for component: CPKMB  No results found for: CHOL, CHOLX, CHLST, CHOLV, HDL, LDL, LDLC, DLDLP, TGLX, TRIGL, TRIGP, CHHD, CHHDX  Lab Results   Component Value Date/Time    Glucose (POC) 242 10/22/2017 11:47 AM    Glucose (POC) 155 10/22/2017 06:53 AM    Glucose (POC) 183 10/21/2017 08:59 PM    Glucose (POC) 233 10/21/2017 04:12 PM    Glucose (POC) 217 10/21/2017 11:09 AM     Lab Results   Component Value Date/Time    Color YELLOW/STRAW 10/10/2017 07:11 PM    Appearance CLEAR 10/10/2017 07:11 PM    Specific gravity 1.022 10/10/2017 07:11 PM    pH (UA) 6.5 10/10/2017 07:11 PM    Protein NEGATIVE  10/10/2017 07:11 PM    Glucose 100 10/10/2017 07:11 PM    Ketone NEGATIVE  10/10/2017 07:11 PM    Bilirubin NEGATIVE  10/10/2017 07:11 PM    Urobilinogen 0.2 10/10/2017 07:11 PM    Nitrites NEGATIVE  10/10/2017 07:11 PM    Leukocyte Esterase NEGATIVE  10/10/2017 07:11 PM    Epithelial cells FEW 10/10/2017 07:11 PM    Bacteria NEGATIVE  10/10/2017 07:11 PM    WBC 0-4 10/10/2017 07:11 PM    RBC 0-5 10/10/2017 07:11 PM         Medications Reviewed:     Current Facility-Administered Medications   Medication Dose Route Frequency    sodium chloride tablet 1 g  1 g Oral TID WITH MEALS    bisacodyl (DULCOLAX) suppository 10 mg  10 mg Rectal DAILY PRN    insulin lispro (HUMALOG) injection 7 Units  7 Units SubCUTAneous ONCE    dexamethasone (DECADRON) tablet 4 mg  4 mg Oral Q12H    insulin lispro (HUMALOG) injection   SubCUTAneous AC&HS    dextrose (D50W) injection syrg 12.5-25 g  12.5-25 g IntraVENous PRN    insulin glargine (LANTUS) injection 8 Units  8 Units SubCUTAneous DAILY    metFORMIN ER (GLUCOPHAGE XR) tablet 750 mg  750 mg Oral DAILY WITH DINNER    HYDROcodone-acetaminophen (NORCO) 5-325 mg per tablet 1 Tab  1 Tab Oral Q4H PRN    lactulose (CHRONULAC) solution 30 g  30 g Oral BID    acetaminophen (TYLENOL) tablet 650 mg  650 mg Oral Q4H PRN    ALPRAZolam (XANAX) tablet 0.125-0.25 mg  0.125-0.25 mg Oral BID PRN    levothyroxine (SYNTHROID) tablet 75 mcg  75 mcg Oral ACB    metoprolol succinate (TOPROL-XL) XL tablet 50 mg  50 mg Oral QHS    pravastatin (PRAVACHOL) tablet 40 mg  40 mg Oral QHS    sodium chloride (NS) flush 5-10 mL  5-10 mL IntraVENous Q8H    sodium chloride (NS) flush 5-10 mL  5-10 mL IntraVENous PRN    naloxone (NARCAN) injection 0.4 mg  0.4 mg IntraVENous PRN    diphenhydrAMINE (BENADRYL) capsule 25 mg  25 mg Oral Q6H PRN    senna-docusate (PERICOLACE) 8.6-50 mg per tablet 1 Tab  1 Tab Oral DAILY    glucose chewable tablet 16 g  4 Tab Oral PRN    dextrose (D50W) injection syrg 12.5-25 g  12.5-25 g IntraVENous PRN    glucagon (GLUCAGEN) injection 1 mg  1 mg IntraMUSCular PRN    pantoprazole (PROTONIX) tablet 40 mg  40 mg Oral ACB     ______________________________________________________________________  EXPECTED LENGTH OF STAY: 2d 12h  ACTUAL LENGTH OF STAY:          Bill Salomon NP

## 2017-10-22 NOTE — PROGRESS NOTES
Bedside and Verbal shift change report given to ABDIAZIZ Harrison (oncoming nurse) by Hammad Dykes (offgoing nurse). Report included the following information SBAR, Kardex, ED Summary, Procedure Summary, Recent Results and Cardiac Rhythm NSR.

## 2017-10-22 NOTE — PROGRESS NOTES
Bedside shift change report given to ABDIAZIZ Pitt (oncoming nurse) by Almas Davidson (offgoing nurse). Report included the following information SBAR, Kardex, Intake/Output, MAR, Recent Results and Cardiac Rhythm NSR.

## 2017-10-22 NOTE — PROGRESS NOTES
Problem: Mobility Impaired (Adult and Pediatric)  Goal: *Acute Goals and Plan of Care (Insert Text)  Physical Therapy Goals  Initiated 10/21/2017  1. Patient will move from supine to sit and sit to supine  in bed with modified independence within 4 days. 2. Patient will perform sit to stand with modified independence within 4 days. 3. Patient will ambulate with independence for 300 feet with the least restrictive device within 4 days. 4. Patient will ascend/descend 3 stairs with 1 handrail(s) with modified independence within 4 days. 5. Patient will verbalize and demonstrate understanding of spinal precautions (No bending, lifting greater than 5 lbs, or twisting; log-roll technique; frequent repositioning as instructed) within 4 days. physical Therapy TREATMENT  Patient: Elena Sarabia (43 y.o. female)  Date: 10/22/2017  Diagnosis: INTRADURAL SPINAL CORD TUMOR  Intradural extramedullary spinal tumor <principal problem not specified>  Procedure(s) (LRB):  T11-L1 LAMINECTOMY AND RESECTION OF INTRADURAL EXTRAMEDULLARY SPINAL CORD TUMOR  (N/A) 3 Days Post-Op  Precautions: Back    ASSESSMENT:  Pt moving very well today, demonstrating independence overall with no further PT needs. Pt safe for discharge home when medically stable. Progression toward goals:  [x]      Improving appropriately and progressing toward goals  []      Improving slowly and progressing toward goals  []      Not making progress toward goals and plan of care will be adjusted     PLAN:  Patient with no further PT needs. Will sign off. Discharge Recommendations:  None  Further Equipment Recommendations for Discharge:  none     SUBJECTIVE:   Patient stated I'm doing well and I'm ready to go.    The patient stated 3/3 back precautions. Reviewed all 3 with patient.     OBJECTIVE DATA SUMMARY:   Critical Behavior:  Neurologic State: Alert, Eyes open spontaneously  Orientation Level: Oriented X4  Cognition: Appropriate decision making, Appropriate for age attention/concentration, Appropriate safety awareness, Follows commands     Functional Mobility Training:  Bed Mobility:  Log Rolling: Independent  Supine to Sit: Independent     Transfers:  Sit to Stand: Independent  Stand to Sit: Independent         Balance:  Sitting: Intact  Standing: Intact  Ambulation/Gait Training:  Distance (ft): 600 Feet (ft)  Assistive Device:  (none)  Ambulation - Level of Assistance: Independent   Base of Support: Narrowed  Speed/Jeanne: Slow      Pain:  Pain Scale 1: Numeric (0 - 10)  Pain Intensity 1: 0  Pain Location 1: Back  Pain Orientation 1: Posterior;Mid  Pain Description 1: Aching  Pain Intervention(s) 1: Medication (see MAR)  Activity Tolerance:   Please refer to the flowsheet for vital signs taken during this treatment.   After treatment:   [x]  Patient left in no apparent distress sitting up in chair  []  Patient left in no apparent distress in bed  [x]  Call bell left within reach  [x]  Nursing notified  [x]  Caregiver present  []  Bed alarm activated    COMMUNICATION/COLLABORATION:   The patients plan of care was discussed with: Registered Nurse, NP Magdaline Schaumann, PT, DPT   Time Calculation: 20 mins

## 2017-10-22 NOTE — PROGRESS NOTES
Nephrology Progress Note  Ron Koyuk  Date of Admission : 10/19/2017    CC: Follow up for hyponatremia       Assessment and Plan     Acute Hyponatremia :  - likely from SIADH and excessive water intake  - Na stable today  - would resume salt tabs  - cont fluid restriction  - daily Na levels for now    Intradural, Extramedullary Spinal mass T12-L1  - s/p T11-L1 laminectomy and resection spinal cord tumor on 10/19      Type II DM      HTN        Interval History:  Seen and examined. No pain, sob, n/v/d. Feeling well. Initial labs showed Na of 124, which were hemolyzed. her repeat was 131. Current Medications: all current  Medications have been eviewed in EPIC  Review of Systems: Pertinent items are noted in HPI. Objective:  Vitals:    Vitals:    10/21/17 2151 10/21/17 2300 10/22/17 0300 10/22/17 0700   BP: 112/55 109/59 105/59 137/70   Pulse: 78 67 63 60   Resp:  15 19 15   Temp:  97 °F (36.1 °C) 97.2 °F (36.2 °C) 97 °F (36.1 °C)   SpO2:  95% 94% 98%   Weight:   69 kg (152 lb 1.9 oz)    Height:         Intake and Output:     10/20 1901 - 10/22 0700  In: 740 [P.O.:740]  Out: 750 [Urine:750]    Physical Examination:  General: NAD,Conversant   Neck:  Supple, no mass  Resp:  Lungs CTA B/L, no wheezing , normal respiratory effort  CV:  RRR,  no murmur or rub, no LE edema  GI:  Soft, NT, + Bowel sounds, no hepatosplenomegaly  Neurologic:  Non focal  Psych:             AAO x 3 appropriate affect   Skin:  No Rash    []    High complexity decision making was performed  []    Patient is at high-risk of decompensation with multiple organ involvement    Lab Data Personally Reviewed: I have reviewed all the pertinent labs, microbiology data and radiology studies during assessment.     Recent Labs      10/22/17   0921  10/22/17   0651  10/21/17   0713   NA  131*  124*  135*   K  4.7  HEMOLYZED,RECOLLECT REQUESTED  4.2   CL  96*  100  100   CO2  25  21  27   GLU  257*  167*  163*   BUN  17  16  17   CREA  0.73  0.56  0.55 CA  9.0  8.2*  8.1*     Recent Labs      10/20/17   0257   WBC  10.4   HGB  12.9   HCT  38.7   PLT  298     No results found for: SDES  Lab Results   Component Value Date/Time    Culture result: KLEBSIELLA PNEUMONIAE 10/02/2017 09:48 AM     Recent Results (from the past 24 hour(s))   GLUCOSE, POC    Collection Time: 10/21/17 11:09 AM   Result Value Ref Range    Glucose (POC) 217 (H) 65 - 100 mg/dL    Performed by ALEJANDRO ARRIETA(CON)    GLUCOSE, POC    Collection Time: 10/21/17  4:12 PM   Result Value Ref Range    Glucose (POC) 233 (H) 65 - 100 mg/dL    Performed by [address], POC    Collection Time: 10/21/17  8:59 PM   Result Value Ref Range    Glucose (POC) 183 (H) 65 - 100 mg/dL    Performed by Candice Tam    METABOLIC PANEL, BASIC    Collection Time: 10/22/17  6:51 AM   Result Value Ref Range    Sodium 124 (L) 136 - 145 mmol/L    Potassium HEMOLYZED,RECOLLECT REQUESTED 3.5 - 5.1 mmol/L    Chloride 100 97 - 108 mmol/L    CO2 21 21 - 32 mmol/L    Anion gap 3 (L) 5 - 15 mmol/L    Glucose 167 (H) 65 - 100 mg/dL    BUN 16 6 - 20 MG/DL    Creatinine 0.56 0.55 - 1.02 MG/DL    BUN/Creatinine ratio 29 (H) 12 - 20      GFR est AA >60 >60 ml/min/1.73m2    GFR est non-AA >60 >60 ml/min/1.73m2    Calcium 8.2 (L) 8.5 - 10.1 MG/DL   GLUCOSE, POC    Collection Time: 10/22/17  6:53 AM   Result Value Ref Range    Glucose (POC) 155 (H) 65 - 100 mg/dL    Performed by Kita Marcelino, BASIC    Collection Time: 10/22/17  9:21 AM   Result Value Ref Range    Sodium 131 (L) 136 - 145 mmol/L    Potassium 4.7 3.5 - 5.1 mmol/L    Chloride 96 (L) 97 - 108 mmol/L    CO2 25 21 - 32 mmol/L    Anion gap 10 5 - 15 mmol/L    Glucose 257 (H) 65 - 100 mg/dL    BUN 17 6 - 20 MG/DL    Creatinine 0.73 0.55 - 1.02 MG/DL    BUN/Creatinine ratio 23 (H) 12 - 20      GFR est AA >60 >60 ml/min/1.73m2    GFR est non-AA >60 >60 ml/min/1.73m2    Calcium 9.0 8.5 - 10.1 MG/DL               Stfefen Maya MD  [facility]ph[facility]   [address], [place] , [facility]  Phone - [phone]   Fax - [phone]  [email or web address]

## 2017-10-23 VITALS
RESPIRATION RATE: 11 BRPM | SYSTOLIC BLOOD PRESSURE: 134 MMHG | HEART RATE: 80 BPM | DIASTOLIC BLOOD PRESSURE: 83 MMHG | BODY MASS INDEX: 19.99 KG/M2 | OXYGEN SATURATION: 99 % | WEIGHT: 112.8 LBS | HEIGHT: 63 IN | TEMPERATURE: 98 F

## 2017-10-23 LAB
ANION GAP SERPL CALC-SCNC: 8 MMOL/L (ref 5–15)
BUN SERPL-MCNC: 20 MG/DL (ref 6–20)
BUN/CREAT SERPL: 38 (ref 12–20)
CALCIUM SERPL-MCNC: 8.2 MG/DL (ref 8.5–10.1)
CHLORIDE SERPL-SCNC: 100 MMOL/L (ref 97–108)
CO2 SERPL-SCNC: 28 MMOL/L (ref 21–32)
CREAT SERPL-MCNC: 0.53 MG/DL (ref 0.55–1.02)
GLUCOSE BLD STRIP.AUTO-MCNC: 274 MG/DL (ref 65–100)
GLUCOSE SERPL-MCNC: 182 MG/DL (ref 65–100)
POTASSIUM SERPL-SCNC: 4.5 MMOL/L (ref 3.5–5.1)
SERVICE CMNT-IMP: ABNORMAL
SODIUM SERPL-SCNC: 136 MMOL/L (ref 136–145)

## 2017-10-23 PROCEDURE — 74011250636 HC RX REV CODE- 250/636: Performed by: NEUROLOGICAL SURGERY

## 2017-10-23 PROCEDURE — 74011250637 HC RX REV CODE- 250/637: Performed by: NEUROLOGICAL SURGERY

## 2017-10-23 PROCEDURE — 74011250637 HC RX REV CODE- 250/637: Performed by: INTERNAL MEDICINE

## 2017-10-23 PROCEDURE — 74011636637 HC RX REV CODE- 636/637: Performed by: NURSE PRACTITIONER

## 2017-10-23 PROCEDURE — 74011250637 HC RX REV CODE- 250/637: Performed by: NURSE PRACTITIONER

## 2017-10-23 PROCEDURE — 80048 BASIC METABOLIC PNL TOTAL CA: CPT | Performed by: INTERNAL MEDICINE

## 2017-10-23 PROCEDURE — 36415 COLL VENOUS BLD VENIPUNCTURE: CPT | Performed by: INTERNAL MEDICINE

## 2017-10-23 PROCEDURE — 82962 GLUCOSE BLOOD TEST: CPT

## 2017-10-23 RX ORDER — DEXAMETHASONE 2 MG/1
TABLET ORAL
Qty: 6 TAB | Refills: 0 | Status: SHIPPED | OUTPATIENT
Start: 2017-10-23 | End: 2019-06-18 | Stop reason: ALTCHOICE

## 2017-10-23 RX ORDER — HYDROCODONE BITARTRATE AND ACETAMINOPHEN 5; 325 MG/1; MG/1
1 TABLET ORAL
Qty: 20 TAB | Refills: 0 | Status: SHIPPED | OUTPATIENT
Start: 2017-10-23 | End: 2019-06-18 | Stop reason: ALTCHOICE

## 2017-10-23 RX ORDER — DEXAMETHASONE 0.5 MG/1
2 TABLET ORAL EVERY 12 HOURS
Status: DISCONTINUED | OUTPATIENT
Start: 2017-10-23 | End: 2017-10-23 | Stop reason: HOSPADM

## 2017-10-23 RX ORDER — SODIUM CHLORIDE TAB 1 GM 1 G
1 TAB MISCELLANEOUS
Qty: 20 TAB | Refills: 0 | Status: SHIPPED | OUTPATIENT
Start: 2017-10-23 | End: 2019-06-18 | Stop reason: ALTCHOICE

## 2017-10-23 RX ORDER — METFORMIN HYDROCHLORIDE 750 MG/1
750 TABLET, EXTENDED RELEASE ORAL 2 TIMES DAILY
Qty: 60 TAB | Refills: 0 | Status: SHIPPED | OUTPATIENT
Start: 2017-10-23

## 2017-10-23 RX ADMIN — LACTULOSE 30 G: 20 SOLUTION ORAL at 08:46

## 2017-10-23 RX ADMIN — INSULIN LISPRO 7 UNITS: 100 INJECTION, SOLUTION INTRAVENOUS; SUBCUTANEOUS at 08:44

## 2017-10-23 RX ADMIN — HYDROCODONE BITARTRATE AND ACETAMINOPHEN 1 TABLET: 5; 325 TABLET ORAL at 02:29

## 2017-10-23 RX ADMIN — PANTOPRAZOLE SODIUM 40 MG: 40 TABLET, DELAYED RELEASE ORAL at 07:21

## 2017-10-23 RX ADMIN — ALPRAZOLAM 0.5 MG: 0.5 TABLET ORAL at 08:46

## 2017-10-23 RX ADMIN — Medication 10 ML: at 07:21

## 2017-10-23 RX ADMIN — HYDROCODONE BITARTRATE AND ACETAMINOPHEN 1 TABLET: 5; 325 TABLET ORAL at 07:24

## 2017-10-23 RX ADMIN — INSULIN GLARGINE 10 UNITS: 100 INJECTION, SOLUTION SUBCUTANEOUS at 08:44

## 2017-10-23 RX ADMIN — LEVOTHYROXINE SODIUM 75 MCG: 75 TABLET ORAL at 07:21

## 2017-10-23 RX ADMIN — DEXAMETHASONE 2 MG: 0.5 TABLET ORAL at 08:45

## 2017-10-23 RX ADMIN — SODIUM CHLORIDE TAB 1 GM 1 G: 1 TAB at 08:46

## 2017-10-23 RX ADMIN — STANDARDIZED SENNA CONCENTRATE AND DOCUSATE SODIUM 1 TABLET: 8.6; 5 TABLET, FILM COATED ORAL at 08:45

## 2017-10-23 NOTE — DISCHARGE INSTRUCTIONS
After Hospital Care Plan:  Discharge Instructions Lumbar Laminectomy Surgery Dr. Judi Mc     Patient Name: Sandi Sanz    Date of procedure: 10/19/2017  Date of discharge: 10/23/2017    Procedure: Procedure(s):  T11-L1 LAMINECTOMY AND RESECTION OF INTRADURAL EXTRAMEDULLARY SPINAL CORD TUMOR   PCP: Eleanor Irvin MD    Follow up appointments  -Follow up with Dr. Judi Mc in 2 weeks. Call [phone] to make an appointment as soon as you get home from the hospital.    When to call your Spine Surgeon:  -Signs of infection-if your incision is red; continues to have drainage; drainage has a foul odor or if you have a persistent fever over 101 degrees for 24 hours  -Nausea or vomiting, severe headache  -Loss of bowel or bladder function, inability to urinate  -Changes in sensation in your arms or legs (numbness, tingling, loss of color)  -Increased weakness-greater than before your surgery  -Severe pain or pain not relieved by medications  -Signs of a blood clot in your leg-calf pain, tenderness, redness, swelling of lower leg    When to call your Primary Care Physician:  -Concerns about medical conditions such as diabetes, high blood pressure, asthma, congestive heart failure  -Call if blood sugars are elevated, persistent headache or dizziness, coughing or congestion, constipation or diarrhea, burning with urination, abnormal heart rate    When to call 911 and go to the nearest emergency room:  -Acute onset of chest pain, shortness of breath, difficulty breathing    Activity  - You are going home a well person, be as active as possible. Your only exercise should be walking. Start with short frequent walks and increase your walking distance each day.  -Limit the amount of time you sit to 20-30 minute intervals.   Sitting for prolonged periods of time will be uncomfortable for you following surgery.  -Do NOT lift anything over 5 pounds  -Do NOT do any straining, twisting or bending  -When you are in bed, you may lay on your back or on either side. Do NOT lie on your stomach    Brace  -Not all patients require a brace, your physician or their assistant will advise on whether you need a brace prior to discharge. -If you have a back brace, you should wear your brace at all times when you are out of bed. Do not wear the brace while in bed or showering.  -Remember to always wear a cotton t-shirt underneath your brace.  -Do not bend or twist when your brace is off    Diet  -Resume usual diet; drink plenty of fluids; eat foods high in fiber  -It is important to have regular bowel movements. Pain medications may cause constipation. You may want to take a stool softener (such as Senokot-S or Colace) to prevent constipation.  -If constipation occurs, take a laxative (such as Dulcolax tablets, Milk of Magnesia, or a suppository). Laxatives should only be used if the above preventable measures have failed and you still have not had a bowel movement after three days    Driving  -You may not drive or return to work until instructed by your physician. However, you may ride in the car for short periods of time. Incision Care  -You may take brief showers but do not run the water run directly onto the wound. After showering or bathing, remove the wet dressing and gently blot the wound dry with a soft towel.  -Do not rub or apply any lotions or ointments to your incision site.   -Do not soak or scrub your wound  -Keep a dry dressing (ABD and paper tape) on your incision and have it changed daily for 14 days after surgery; more often if your incision is draining. Have your caregiver wash their hands thoroughly before changing your dressing.  -You will have absorbable sutures and steristrips (white tape) on your incision. Leave the steristrips on until they fall off. Showering  -You may shower in approximately 2 days after your surgery.    -Leave the dressing on during your shower.   Do NOT allow the water to run directly onto your dressing. Once you get out of the shower, put on a dry dressing.  -Reminder- your brace can be removed while showering. Remember to not bend or twist while your brace is off.    -Do not take a tub bath. Preventing blood clots  -You have been given T.E.D. stockings to wear. Continue to wear these for 7 days after your discharge. Put them on in the morning and take them off at night.    -They are used to increase your circulation and prevent blood clots from forming in your legs  -T. E.D. stockings can be machine washed, temperature not to exceed 160° F (71°C) and machine dried for 15 to 20 minutes, temperature not to exceed 250° F (121°C). Pain management  -Take pain medication as prescribed; decrease the amount you use as your pain lessens  -Do not wait until you are in extreme pain to take your medication.  -Avoid alcoholic beverages while taking pain medication    Pain Medication Safety  DO:  -Read the Medication Guide   -Take your medicine exactly as prescribed   -Store your medicine away from children and in a safe place   -Call your healthcare provider for medical advice about side effects. You may  report side effects to FDA at 3-966-FDA-8762.   -Please be aware that many medications contain Tylenol. We do not want you to over medicate so please read the information below as a guide. Do not take more than 4 Grams of Tylenol in a 24 hour period.   (There are 1000 milligrams in one Gram)                                                                                                                                                                                                                                                                                      Percocet contains 325 mg of Tylenol per tablet (do not take more than 12 tablets in 24 hours)  Lortab contains 500 mg of Tylenol per tablet (do not take more than 8 tablets in 24 hours)  Norco contains 325 mg of Tylenol per tablet (do not take more than 12 tablets in 24 hours). DO NOT:  -Do not give your medicine to others   -Do not take medicine unless it was prescribed for you   -Do not stop taking your medicine without talking to your healthcare provider   -Do not break, chew, crush, dissolve, or inject your medicine. If you cannot  swallow your medicine whole, talk to your healthcare provider.  -Do not drink alcohol while taking this medicine  -Do not take anti-inflammatory medications or aspirin unless instructed by your  physician.

## 2017-10-23 NOTE — INTERDISCIPLINARY ROUNDS
IDR/SLIDR Summary          Patient: Destinee Barr MRN: [id number]    Age: 57 y.o. YOB: 1955 Room/Bed: [facility]/   Admit Diagnosis: INTRADURAL SPINAL CORD TUMOR  Intradural extramedullary spinal tumor  Principal Diagnosis: <principal problem not specified>   Goals: Increased mobility, safety  Readmission: YES  Quality Measure: Not applicable  VTE Prophylaxis: Mechanical  Influenza Vaccine screening completed? YES  Pneumococcal Vaccine screening completed? YES  Mobility needs: Yes   Nutrition plan:No  Consults: P. T and O.T. Financial concerns:No  Escalated to CM? NO  RRAT Score: 9   Interventions:Home Health  Testing due for pt today?  NO  LOS: 3 days Expected length of stay 4 days  Discharge plan: home   PCP: Sonya Saunders MD  Transportation needs: No    Days before discharge:one day until discharge   Discharge disposition: Home    Signed:     Imelda Nance RN  10/23/17  0730 am

## 2017-10-23 NOTE — PROGRESS NOTES
CM received a message from [facility] and they accepted this pt for home care.  Mark Barnes

## 2017-10-23 NOTE — PROGRESS NOTES
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 We were consulted to manage her DM in the setting of steroids. Interval history / Subjective:     Patient walking in room. No acute complaints. Daughter in room, discussed recommendations with both patient and daughter. Dicussed recommendations and following up with pcp or nephrology in 1 week to recheck sodium levels. Assessment & Plan:     Type 2 DM in the setting of Hyperglycemia  Higher due to steroids, blood sugar [id number]  Metformin 750mg daily at home   Recommending increasing Metformin to twice daily   Advised pt to check blood sugar twice and record and take to pcp follow up     Hypertension  Well controlled on home regimen     Hyperlipidemia  Pravastatin    Hyponatremia-resolved  Element of SIADH  Nephrology consulted  Resume Na tabs  wnl today. Recommending repeating labs next week with pcp or nephrology    S/p T11-L1 Laminectomy and resection of Spinal cord Tumor  Per primary team    Code status: full  DVT prophylaxis: per primary team    Care Plan discussed with: Patient/Family and Nurse Janiya Crocker Neurosurgery NP  Disposition: stable for discharge home from [address] [place] [address] Problems  Date Reviewed: 10/19/2017          Codes Class Noted POA    Intradural extramedullary spinal tumor ICD-10-CM: D49.7  ICD-9-CM: 239.7  10/19/2017 Unknown                Review of Systems:   A comprehensive review of systems was negative except for that written in the HPI. Vital Signs:    Last 24hrs VS reviewed since prior progress note.  Most recent are:  Visit Vitals    /74 (BP 1 Location: Right arm, BP Patient Position: At rest)    Pulse 64    Temp 97.8 °F (36.6 °C)    Resp 16    Ht 5' 3\" (1.6 m)    Wt 51.2 kg (112 lb 12.8 oz)    SpO2 98%    BMI 19.98 kg/m2         Intake/Output Summary (Last 24 hours) at 10/23/17 9938  Last data filed at 10/22/17 1200   Gross per 24 hour   Intake              150 ml   Output                0 ml   Net              150 ml        Physical Examination:             Constitutional:  No acute distress, cooperative, pleasant    ENT:  Oral mucous moist, oropharynx benign. Neck supple,    Resp:  CTA bilaterally. No wheezing/rhonchi/rales. No accessory muscle use   CV:  Regular rhythm, normal rate, no murmurs, gallops, rubs    GI:  Soft, non distended, non tender. normoactive bowel sounds, no hepatosplenomegaly     Musculoskeletal:  No edema, warm, 2+ pulses throughout    Neurologic:  Moves all extremities. AAOx3,     Psych:  Good insight, Not anxious nor agitated. Skin:  Good turgor, no rashes or ulcers, dressing in place on back       Data Review:    Review and/or order of clinical lab test  Review and/or order of tests in the medicine section of CPT      Labs:     No results for input(s): WBC, HGB, HCT, PLT, HGBEXT, HCTEXT, PLTEXT, HGBEXT, HCTEXT, PLTEXT in the last 72 hours. Recent Labs      10/23/17   0610  10/22/17   0921  10/22/17   0651   NA  136  131*  124*   K  4.5  4.7  HEMOLYZED,RECOLLECT REQUESTED   CL  100  96*  100   CO2  28  25  21   BUN  20  17  16   CREA  0.53*  0.73  0.56   GLU  182*  257*  167*   CA  8.2*  9.0  8.2*     No results for input(s): SGOT, GPT, ALT, AP, TBIL, TBILI, TP, ALB, GLOB, GGT, AML, LPSE in the last 72 hours. No lab exists for component: AMYP, HLPSE  No results for input(s): INR, PTP, APTT in the last 72 hours. No lab exists for component: INREXT, INREXT   No results for input(s): FE, TIBC, PSAT, FERR in the last 72 hours.    No results found for: FOL, RBCF   No results for input(s): PH, PCO2, PO2 in the last 72 hours. No results for input(s): CPK, CKNDX, TROIQ in the last 72 hours.     No lab exists for component: CPKMB  No results found for: CHOL, CHOLX, CHLST, CHOLV, HDL, LDL, LDLC, DLDLP, TGLX, TRIGL, TRIGP, CHHD, CHHDX  Lab Results   Component Value Date/Time    Glucose (POC) 274 10/23/2017 08:26 AM    Glucose (POC) 248 10/22/2017 08:58 PM    Glucose (POC) 189 10/22/2017 04:50 PM    Glucose (POC) 242 10/22/2017 11:47 AM    Glucose (POC) 155 10/22/2017 06:53 AM     Lab Results   Component Value Date/Time    Color YELLOW/STRAW 10/10/2017 07:11 PM    Appearance CLEAR 10/10/2017 07:11 PM    Specific gravity 1.022 10/10/2017 07:11 PM    pH (UA) 6.5 10/10/2017 07:11 PM    Protein NEGATIVE  10/10/2017 07:11 PM    Glucose 100 10/10/2017 07:11 PM    Ketone NEGATIVE  10/10/2017 07:11 PM    Bilirubin NEGATIVE  10/10/2017 07:11 PM    Urobilinogen 0.2 10/10/2017 07:11 PM    Nitrites NEGATIVE  10/10/2017 07:11 PM    Leukocyte Esterase NEGATIVE  10/10/2017 07:11 PM    Epithelial cells FEW 10/10/2017 07:11 PM    Bacteria NEGATIVE  10/10/2017 07:11 PM    WBC 0-4 10/10/2017 07:11 PM    RBC 0-5 10/10/2017 07:11 PM         Medications Reviewed:     Current Facility-Administered Medications   Medication Dose Route Frequency    dexamethasone (DECADRON) tablet 2 mg  2 mg Oral Q12H    sodium chloride tablet 1 g  1 g Oral TID WITH MEALS    bisacodyl (DULCOLAX) suppository 10 mg  10 mg Rectal DAILY PRN    insulin glargine (LANTUS) injection 10 Units  10 Units SubCUTAneous DAILY    ALPRAZolam (XANAX) tablet 0.5 mg  0.5 mg Oral BID PRN    insulin lispro (HUMALOG) injection   SubCUTAneous AC&HS    dextrose (D50W) injection syrg 12.5-25 g  12.5-25 g IntraVENous PRN    metFORMIN ER (GLUCOPHAGE XR) tablet 750 mg  750 mg Oral DAILY WITH DINNER    HYDROcodone-acetaminophen (NORCO) 5-325 mg per tablet 1 Tab  1 Tab Oral Q4H PRN    lactulose (CHRONULAC) solution 30 g  30 g Oral BID    acetaminophen (TYLENOL) tablet 650 mg  650 mg Oral Q4H PRN    levothyroxine (SYNTHROID) tablet 75 mcg  75 mcg Oral ACB    metoprolol succinate (TOPROL-XL) XL tablet 50 mg  50 mg Oral QHS    pravastatin (PRAVACHOL) tablet 40 mg  40 mg Oral QHS    sodium chloride (NS) flush 5-10 mL  5-10 mL IntraVENous Q8H    sodium chloride (NS) flush 5-10 mL  5-10 mL IntraVENous PRN    naloxone (NARCAN) injection 0.4 mg  0.4 mg IntraVENous PRN    diphenhydrAMINE (BENADRYL) capsule 25 mg  25 mg Oral Q6H PRN    senna-docusate (PERICOLACE) 8.6-50 mg per tablet 1 Tab  1 Tab Oral DAILY    glucose chewable tablet 16 g  4 Tab Oral PRN    dextrose (D50W) injection syrg 12.5-25 g  12.5-25 g IntraVENous PRN    glucagon (GLUCAGEN) injection 1 mg  1 mg IntraMUSCular PRN    pantoprazole (PROTONIX) tablet 40 mg  40 mg Oral ACB     ______________________________________________________________________  EXPECTED LENGTH OF STAY: 2d 12h  ACTUAL LENGTH OF STAY:          [id number], NP

## 2017-10-23 NOTE — PROGRESS NOTES
This pt has home health orders. AMANDA met with pt to discuss and to offer choice. She stated that she would like [facility] [address] [place] [address] to provide her with home care. Cm sent a referral to [facility] [address] [place] [address] in [employer].  Veena Quinn

## 2017-10-23 NOTE — PROGRESS NOTES
Pharmacist Discharge Medication Reconciliation    Discharging Provider: Christiano Anderson NP    Significant PMH:   Past Medical History:   Diagnosis Date    Anxiety     Diabetes ([address]     type 2; NIDDM    GERD (gastroesophageal reflux disease)     Hypercholesteremia     Hypertension     Hypothyroidism      Chief Complaint for this Admission: No chief complaint on file. Allergies: Review of patient's allergies indicates no known allergies. Discharge Medications:   Current Discharge Medication List        CONTINUE these medications which have CHANGED    Details   dexamethasone (DECADRON) 2 mg tablet Take 1 tab PO twice a day x 2 days then 1 tab PO daily x 2 days then stop  Qty: 6 Tab, Refills: 0      HYDROcodone-acetaminophen (NORCO) 5-325 mg per tablet Take 1 Tab by mouth every four (4) hours as needed. Max Daily Amount: 6 Tabs. PRN severe pain  Qty: 20 Tab, Refills: 0      lactulose (CHRONULAC) 10 gram/15 mL solution Take 45 mL by mouth two (2) times a day for 5 days. Qty: 450 mL, Refills: 0      sodium chloride 1 gram tablet Take 1 Tab by mouth three (3) times daily (with meals). Qty: 20 Tab, Refills: 0      metFORMIN ER (GLUCOPHAGE XR) 750 mg tablet Take 1 Tab by mouth two (2) times a day. Qty: 60 Tab, Refills: 0           CONTINUE these medications which have NOT CHANGED    Details   acetaminophen (TYLENOL) 325 mg tablet Take 2 Tabs by mouth every four (4) hours as needed. Indications: Headache Disorder  Qty: 30 Tab, Refills: 0      L. acidoph & paracasei- S therm- Bifido (AYLIN-Q/RISAQUAD) 8 billion cell cap cap Take 1 Cap by mouth daily. Qty: 7 Cap, Refills: 0      ALPRAZolam (XANAX) 0.25 mg tablet Take 0.125-0.25 mg by mouth two (2) times daily as needed for Anxiety (with steroid). metoprolol succinate (TOPROL-XL) 50 mg XL tablet Take 50 mg by mouth nightly. pravastatin (PRAVACHOL) 40 mg tablet Take 40 mg by mouth nightly.       levothyroxine (SYNTHROID) 75 mcg tablet Take 75 mcg by mouth Daily (before breakfast).            STOP taking these medications       furosemide (LASIX) 20 mg tablet Comments:   Reason for Stopping:               The patient's chart, MAR and AVS were reviewed by Gege Mcclellan, PHARMD.

## 2017-10-23 NOTE — DISCHARGE SUMMARY
Discharge Summary     Patient ID:  Lavelle Frankel  [id number]   15 y.o.  1955    Admit date: 10/19/2017    Discharge Date: 10/23/2017      Admitting Physician: Thelma Cheung MD     Discharge Physician: Tamra Zuniga NP    Admission Diagnoses: INTRADURAL SPINAL CORD TUMOR  Intradural extramedullary spinal tumor    Last Procedure: Procedure(s):  T11-L1 LAMINECTOMY AND RESECTION OF INTRADURAL EXTRAMEDULLARY SPINAL CORD TUMOR     Discharge Diagnoses: Active Problems:    Intradural extramedullary spinal tumor (10/19/2017)         Consults:   1. Nephrology for hyponatremia  2. Hospitalist for diabetes management    Significant Diagnostic Studies:   1. None    Patient condition upon discharge: Stable    Hospital Course: The patient was a planned readmission for removal of an intradural extramedullary lesion at T12/L1. She underwent a T12/L1 laminectomy with Dr. Rhys Foster on 10/19/17. The intraoperative findings can be found in his operative report. Her pathology was consistent with benign fibrin and blood elements, but no tumor was found. The melanoma cocktail stains were negative. Post-operatively, the patient was transferred to [facility]. She remained flat in bed for about 24 hours post-op and was able to liberalize the head of the bed and activity out of bed after that. She did not have any headaches or signs of a CSF leak. Her back pain had improved. Nephrology was consulted to help with her hyponatremia, which was stable. She was placed on her salt tablets prior to discharge but remained off of the Lasix. She was able to ambulate without assistance, take PO well and was voiding on her own. She was seen by physical therapy and found to have no discharge needs for home. Her pain was well-controlled. She was also having adequate bowel movements on her lactulose. Her metformin was doubled since her blood sugars were elevated on the steroids.  She will follow-up with her PCP to re-evaluate her blood sugar control as the steroids are tapered. She will also follow-up with Dr. Josie Love for a wound check and pathology review as well as nephrology for labs to monitor her sodium levels. Discharge instructions were discussed with the patient and her daughter and all of their questions were answered to their apparent satisfaction. Disposition: Home    Patient Instructions:   Current Discharge Medication List      CONTINUE these medications which have CHANGED    Details   dexamethasone (DECADRON) 2 mg tablet Take 1 tab PO twice a day x 2 days then 1 tab PO daily x 2 days then stop  Qty: 6 Tab, Refills: 0      HYDROcodone-acetaminophen (NORCO) 5-325 mg per tablet Take 1 Tab by mouth every four (4) hours as needed. Max Daily Amount: 6 Tabs. PRN severe pain  Qty: 20 Tab, Refills: 0      lactulose (CHRONULAC) 10 gram/15 mL solution Take 45 mL by mouth two (2) times a day for 5 days. Qty: 450 mL, Refills: 0      sodium chloride 1 gram tablet Take 1 Tab by mouth three (3) times daily (with meals). Qty: 20 Tab, Refills: 0         CONTINUE these medications which have NOT CHANGED    Details   acetaminophen (TYLENOL) 325 mg tablet Take 2 Tabs by mouth every four (4) hours as needed. Indications: Headache Disorder  Qty: 30 Tab, Refills: 0      L. acidoph & paracasei- S therm- Bifido (AYLIN-Q/RISAQUAD) 8 billion cell cap cap Take 1 Cap by mouth daily. Qty: 7 Cap, Refills: 0      ALPRAZolam (XANAX) 0.25 mg tablet Take 0.125-0.25 mg by mouth two (2) times daily as needed for Anxiety (with steroid). metoprolol succinate (TOPROL-XL) 50 mg XL tablet Take 50 mg by mouth nightly. pravastatin (PRAVACHOL) 40 mg tablet Take 40 mg by mouth nightly. levothyroxine (SYNTHROID) 75 mcg tablet Take 75 mcg by mouth Daily (before breakfast). metFORMIN ER (GLUCOPHAGE XR) 750 mg tablet Take 750 mg by mouth daily (after dinner).          STOP taking these medications       furosemide (LASIX) 20 mg tablet Comments:   Reason for Stopping:               Diet: Reference my discharge instructions. Activity: Reference my discharge instructions. EXAM:   Gen:NAD. Neuro: A&Ox3. Follows commands. Speech clear. Affect normal.  PERRL.  GLYNN. Strength 5/5 in UE and LE BL. Negative drift. Gait deferred. Skin: Incision C/D/I with steri-strips in place. Heart RRR  Lungs CTA BL  Abd soft, NT. Normal bowel sounds  Ext no edema  Follow-up Appointments   Procedures    FOLLOW UP VISIT Appointment in: One Week     Standing Status:   Standing     Number of Occurrences:   1     Order Specific Question:   Appointment in     Answer: One Week        Total time discharging patient took greater than 30 minutes.     Signed:  Lizette Conrad NP  October 23, 2017  8:49 AM

## 2017-10-23 NOTE — ROUTINE PROCESS
I have reviewed discharge instructions with the patient. The patient verbalized understanding. Discharge Medication List as of 10/23/2017 10:15 AM      CONTINUE these medications which have CHANGED    Details   dexamethasone (DECADRON) 2 mg tablet Take 1 tab PO twice a day x 2 days then 1 tab PO daily x 2 days then stop, Print, Disp-6 Tab, R-0      HYDROcodone-acetaminophen (NORCO) 5-325 mg per tablet Take 1 Tab by mouth every four (4) hours as needed. Max Daily Amount: 6 Tabs. PRN severe pain, Print, Disp-20 Tab, R-0      lactulose (CHRONULAC) 10 gram/15 mL solution Take 45 mL by mouth two (2) times a day for 5 days. , Print, Disp-450 mL, R-0      sodium chloride 1 gram tablet Take 1 Tab by mouth three (3) times daily (with meals). , Print, Disp-20 Tab, R-0      metFORMIN ER (GLUCOPHAGE XR) 750 mg tablet Take 1 Tab by mouth two (2) times a day., Print, Disp-60 Tab, R-0         CONTINUE these medications which have NOT CHANGED    Details   acetaminophen (TYLENOL) 325 mg tablet Take 2 Tabs by mouth every four (4) hours as needed. Indications: Headache Disorder, No Print, Disp-30 Tab, R-0      L. acidoph & paracasei- S therm- Bifido (AYLIN-Q/RISAQUAD) 8 billion cell cap cap Take 1 Cap by mouth daily. , Print, Disp-7 Cap, R-0      ALPRAZolam (XANAX) 0.25 mg tablet Take 0.125-0.25 mg by mouth two (2) times daily as needed for Anxiety (with steroid). , Historical Med      metoprolol succinate (TOPROL-XL) 50 mg XL tablet Take 50 mg by mouth nightly., Historical Med      pravastatin (PRAVACHOL) 40 mg tablet Take 40 mg by mouth nightly., Historical Med      levothyroxine (SYNTHROID) 75 mcg tablet Take 75 mcg by mouth Daily (before breakfast). , Historical Med         STOP taking these medications       furosemide (LASIX) 20 mg tablet Comments:   Reason for Stopping:             Discharge medications reviewed with patient and appropriate educational materials and side effects teaching were provided.   PT had PIV and telemetry discontinued per order patient and belongings transported home with daughter.

## 2017-10-23 NOTE — PROGRESS NOTES
Bedside and Verbal shift change report given to ABDIAZIZ Archer (oncoming nurse) by Lluvia Tim (offgoing nurse). Report included the following information SBAR, Kardex, ED Summary, Procedure Summary, Recent Results and Cardiac Rhythm NSR.

## 2017-10-23 NOTE — DIABETES MGMT
DTC Progress Note    Recommendations/ Comments: Chart reviewed due to elevated blood sugars, ranging 182-274 mg/dl. Hyperglycemia likely due to steroids. Steroids now tapering down. Noted Lantus increased to 10 units. Pt received 16 units of correction insulin in the past 24 hours. DTC will continue to follow. Chart reviewed and initial evaluation complete on Juanito Garcia. Patient is a 57 y.o. female with known DM on Metformin 750 mg daily at supper at home. A1c:   Lab Results   Component Value Date/Time    Hemoglobin A1c 7.0 10/21/2017 07:13 AM       Recent Glucose Results:   Lab Results   Component Value Date/Time     (H) 10/23/2017 06:10 AM    GLUCPOC 274 (H) 10/23/2017 08:26 AM    GLUCPOC 248 (H) 10/22/2017 08:58 PM    GLUCPOC 189 (H) 10/22/2017 04:50 PM        Lab Results   Component Value Date/Time    Creatinine 0.53 10/23/2017 06:10 AM       Active Orders   Diet    DIET DIABETIC CONSISTENT CARB Regular; FR 1500ML; No Conc. Sweets        PO intake:   Patient Vitals for the past 72 hrs:   % Diet Eaten   10/22/17 1200 100 %   10/22/17 0800 100 %   10/21/17 1200 100 %   10/21/17 0800 100 %       Current hospital DM medication: lispro insulin resistant scale and Metformin 750 mg after dinner. Thank you.   Xochitl Nick RD

## 2017-10-24 NOTE — OP NOTES
[address]   [address] [facility]   OP NOTE       Name:  Esthela Grissom   MR#:  [id number]   :  1955   Account #:  [de-identified]    Surgery Date:  10/19/2017   Date of Adm:  10/19/2017       PREOPERATIVE DIAGNOSIS: Intradural extramedullary hemorrhagic   mass at T12-L1. POSTOPERATIVE DIAGNOSIS: Intradural extramedullary   hemorrhagic mass at T12-L1. PROCEDURES PERFORMED: Thoracic 12, lumbar 1 laminectomy   with resection of intradural extramedullary hemorrhagic tumor. Use of   operating microscope. Use of intraoperative spinal cord monitoring with   neurophysiology. ANESTHESIA: General endotracheal anesthesia. SURGEON: Yong Grullon. Bret Trejo MD    ASSISTANT:     ESTIMATED BLOOD LOSS: 100 mL. COMPLICATIONS: None. SPECIMENS REMOVED: Tumor for frozen and permanent. Frozen   section came back as mainly old blood. FINDINGS: Hemorrhagic mass to the right of the spinal canal with   associated subdural and subarachnoid hemorrhage. OPERATIVE INDICATIONS: This is a 47-year-old female who   presented with an acute subarachnoid subdural hemorrhage of the   spinal canal. She was stabilized in the hospital. She underwent a   systemic workup which did not show any obvious primary pathology. She was discharged home on steroids and pain medications and   brought back in for surgery. Informed consent was obtained after   discussion of the situation in great detail with her and her family. DESCRIPTION OF PROCEDURE: The patient was taken to the   operating room, placed under general endotracheal anesthesia. All   necessary lines and monitors were placed. She was given appropriate   dose of IV antibiotics. SCDs and Salmeron were placed. She was hooked   up for neurophysiological spinal monitoring, motor and SSEPs were   monitored during the case without any detrimental change. The patient   was then placed prone on chest rolls, all pressure points were padded.    The thoracolumbar region was prepped and draped in a standard   sterile fashion. Fluoroscopy was used for localization. Incision was made from the top of T12 to the bottom of L1 with a skin   knife, carried down with Bovie electrocautery in the avascular midline   plane. Subperiosteal dissection was performed on the lamina of T12   and L1. Fluoroscopy again was used for localization. Self-retaining   retractor was placed. Dissection was carried out over the facet joints. Karolee Corning were used to remove the superficial lamina and   spinous processes. High-speed drill was used to drill down more of the   lamina. Curettes and Kerrisons were used to perform a complete   laminectomy of T12 and L1, undercutting T11, T12. A medial   facetectomy was performed at T12, L1 on the right. The dura looked a   little bit bluish and full, especially on the right-hand side. Epidural veins   were bipolared. FloSeal was used for hemostasis. A midline durotomy   was then performed and the dura was tacked up. The subarachnoid   space was very murky, it was then opened and there was significant   amount of organized hematoma, some clot, some hemosiderin stained   throughout along the cord and in the subarachnoid space. This was   irrigated out and removed from the dorsal surface of the cord and also   from mainly on the right-hand side. Fair amount of blood clot was   above and below the surgical field and I irrigated and suctioned this   out. The operating scope was used. Under high magnification, I was able to   dissect and identify the plane of the right lateral spinal cord. I identified   a hemorrhagic mass at this level. It may have been coming off of the   right T12 dorsal nerve root. I freed up the mass and cored it out a little   bit and sent off this tissue separately. Frozen section came back as   mostly blood products.  I then identified more of a mass lateral to the   spinal cord, peeled this away from the spinal cord and peeled it away   from the nerve root. There was an arterial along the nerve root that   was feeding this mass and was bleeding, this was cauterized, I did not   have to take any nerve roots. There clearly did appear to be a mass or   a tumor that was sent for permanent section. I then continued to   examine the right intrathecal sac and the spinal cord, removing some   blood clot, but no further tumor was identified. The dura was clean. I   did open the dentate ligament and look along the ventral surface, while   there was some blood, there was no evidence of any mass. I then   copiously irrigated out the intrathecal space with antibiotic solution. The   dura was then closed with a running 5-0 Osborn-Сергей suture in a locking   fashion for a watertight closure. Scope was taken out of the field. I then   covered the dural closure with a layer of DuraSeal fibrin sealant. There   was no evidence of leak under Valsalva maneuver. Hemostasis of the   muscle wall was obtained. Retractors were removed. The wound was   then closed in a watertight fashion using 0 Vicryl to close the deep   fascial layer, 2-0 Vicryl on the deep dermal layer, and running 4-0   Monocryl in a subcuticular fashion. Wounds were clean, dry, dressed   with sterile dressing. The patient was then flipped over, extubated,   taken to recovery room in stable condition. MD OPAL Johnson / DOUGLAS   D:  10/23/2017   16:41   T:  10/24/2017   00:50   Job #:  [id number]

## 2017-12-14 ENCOUNTER — HOSPITAL ENCOUNTER (OUTPATIENT)
Dept: MAMMOGRAPHY | Age: 62
Discharge: HOME | End: 2017-12-14
Attending: PHYSICIAN ASSISTANT
Payer: COMMERCIAL

## 2017-12-14 DIAGNOSIS — Z12.39 SCREENING BREAST EXAMINATION: ICD-10-CM

## 2017-12-14 PROCEDURE — 77067 SCR MAMMO BI INCL CAD: CPT

## 2018-05-21 ENCOUNTER — HOSPITAL ENCOUNTER (OUTPATIENT)
Dept: MRI IMAGING | Age: 63
Discharge: HOME | End: 2018-05-21
Attending: NEUROLOGICAL SURGERY
Payer: COMMERCIAL

## 2018-05-21 DIAGNOSIS — D49.7 SPINAL CORD TUMOR: ICD-10-CM

## 2018-05-21 LAB — CREAT BLD-MCNC: 0.6 MG/DL (ref 0.6–1.3)

## 2018-05-21 PROCEDURE — 74011250636 HC RX REV CODE- 250/636: Performed by: NEUROLOGICAL SURGERY

## 2018-05-21 PROCEDURE — 82565 ASSAY OF CREATININE: CPT

## 2018-05-21 PROCEDURE — A9576 INJ PROHANCE MULTIPACK: HCPCS | Performed by: NEUROLOGICAL SURGERY

## 2018-05-21 PROCEDURE — 72158 MRI LUMBAR SPINE W/O & W/DYE: CPT

## 2018-05-21 RX ADMIN — GADOTERIDOL 10 ML: 279.3 INJECTION, SOLUTION INTRAVENOUS at 20:06

## 2019-06-18 ENCOUNTER — OFFICE VISIT (OUTPATIENT)
Dept: ONCOLOGY | Age: 64
End: 2019-06-18

## 2019-06-18 VITALS
DIASTOLIC BLOOD PRESSURE: 89 MMHG | OXYGEN SATURATION: 99 % | HEART RATE: 80 BPM | TEMPERATURE: 96.7 F | HEIGHT: 63 IN | WEIGHT: 113 LBS | RESPIRATION RATE: 18 BRPM | BODY MASS INDEX: 20.02 KG/M2 | SYSTOLIC BLOOD PRESSURE: 155 MMHG

## 2019-06-18 DIAGNOSIS — D70.9 NEUTROPENIA, UNSPECIFIED TYPE (HCC): Primary | ICD-10-CM

## 2019-06-18 RX ORDER — GLUCOSAMINE SULFATE 1500 MG
POWDER IN PACKET (EA) ORAL DAILY
COMMUNITY

## 2019-06-18 RX ORDER — AA/PROT/LYSINE/METHIO/VIT C/B6 50-12.5 MG
TABLET ORAL
COMMUNITY

## 2019-06-18 NOTE — PROGRESS NOTES
[facility] [place] at [address], [address] [facility] [address] [facility]  Letty Navarro: [phone]  F: [phone]      Reason for Visit:   Indio Sebastian is a 70 y.o. female who is seen in consultation at the request of Dr. Annika Escoto for evaluation of leukopenia. History of Present Illness: Indio Sebastian is a pleasant 70 y.o. female who presents today for evaluation of leukopenia. She reports seeing her PCP for a routine visit and her WBC was low. She was brought back for a repeat which confirmed this. She reports feeling well. No recent or recurring infections. No fevers, chills, night sweats, adenopathy. She has lost a few pounds over the past year, unintentionally, but she wonders if this is due to a change in her diet. Occasionally low energy. No rash. Eats a well rounded diet. No family history of hematologic issues. Past Medical History:   Diagnosis Date    Anxiety     Diabetes ([address]     type 2; NIDDM    GERD (gastroesophageal reflux disease)     Hypercholesteremia     Hypertension     Hypothyroidism       History reviewed. No pertinent surgical history. Social History     Tobacco Use    Smoking status: Never Smoker    Smokeless tobacco: Never Used   Substance Use Topics    Alcohol use: No      Family History   Problem Relation Age of Onset    Stroke Father     Anesth Problems Neg Hx      Current Outpatient Medications   Medication Sig    cholecalciferol (VITAMIN D3) 1,000 unit cap Take  by mouth daily.  coenzyme q10 (CO Q-10) 10 mg cap Take  by mouth.  OTHER Indications: neal juice daily    metFORMIN ER (GLUCOPHAGE XR) 750 mg tablet Take 1 Tab by mouth two (2) times a day.  acetaminophen (TYLENOL) 325 mg tablet Take 2 Tabs by mouth every four (4) hours as needed. Indications: Headache Disorder    L. acidoph & paracasei- S therm- Bifido (AYLIN-Q/RISAQUAD) 8 billion cell cap cap Take 1 Cap by mouth daily.     metoprolol succinate (TOPROL-XL) 50 mg XL tablet Take 50 mg by mouth nightly.  levothyroxine (SYNTHROID) 75 mcg tablet Take 75 mcg by mouth Daily (before breakfast). No current facility-administered medications for this visit. No Known Allergies     Review of Systems: A complete review of systems was obtained, negative except as described above. Physical Exam:     Visit Vitals  /89 (BP 1 Location: Right arm, BP Patient Position: Sitting)   Pulse 80   Temp 96.7 °F (35.9 °C) (Temporal)   Resp 18   Ht 5' 3\" (1.6 m)   Wt 113 lb (51.3 kg)   SpO2 99%   BMI 20.02 kg/m²     General: No distress  Eyes: PERRLA, anicteric sclerae  HENT: Atraumatic, OP clear  Neck: Supple  Lymphatic: No cervical, supraclavicular, or inguinal adenopathy  Respiratory: CTAB, normal respiratory effort  CV: Normal rate, regular rhythm, no murmurs, no peripheral edema  GI: Soft, nontender, nondistended, no masses, no hepatomegaly, no splenomegaly  MS: Normal gait and station. Digits without clubbing or cyanosis. Skin: No rashes, ecchymoses, or petechiae. Normal temperature, turgor, and texture. Psych: Alert, oriented, appropriate affect, normal judgment/insight    Results:     Lab Results   Component Value Date/Time    WBC 10.4 10/20/2017 02:57 AM    HGB 12.9 10/20/2017 02:57 AM    HCT 38.7 10/20/2017 02:57 AM    PLATELET 421 57/31/9215 02:57 AM    MCV 88.2 10/20/2017 02:57 AM    ABS.  NEUTROPHILS 5.9 10/07/2017 09:48 AM     Lab Results   Component Value Date/Time    Sodium 136 10/23/2017 06:10 AM    Potassium 4.5 10/23/2017 06:10 AM    Chloride 100 10/23/2017 06:10 AM    CO2 28 10/23/2017 06:10 AM    Glucose 182 (H) 10/23/2017 06:10 AM    BUN 20 10/23/2017 06:10 AM    Creatinine 0.53 (L) 10/23/2017 06:10 AM    GFR est AA >60 10/23/2017 06:10 AM    GFR est non-AA >60 10/23/2017 06:10 AM    Calcium 8.2 (L) 10/23/2017 06:10 AM    Glucose (POC) 274 (H) 10/23/2017 08:26 AM    Creatinine (POC) 0.6 05/21/2018 08:01 PM     Lab Results   Component Value Date/Time    Bilirubin, total 0.6 10/11/2017 03:37 AM    ALT (SGPT) 19 10/11/2017 03:37 AM    AST (SGOT) 12 (L) 10/11/2017 03:37 AM    Alk. phosphatase 70 10/11/2017 03:37 AM    Protein, total 6.9 10/11/2017 03:37 AM    Albumin 3.4 (L) 10/11/2017 03:37 AM    Globulin 3.5 10/11/2017 03:37 AM     Lab Results   Component Value Date/Time    TSH 0.69 10/12/2017 03:48 AM    Lipase 162 10/05/2017 03:44 PM     Lab Results   Component Value Date/Time    INR 1.1 10/11/2017 03:37 AM    aPTT 24.7 10/05/2017 03:44 PM     WBC (K/uL)   Date Value   10/20/2017 10.4   10/07/2017 7.3   10/06/2017 5.7   10/05/2017 8.8   10/02/2017 6.8         11/29/2017  WBC: 3.3  ANC: 1.9    3/8/2018  WBC: 4.1  ANC: 2.4    7/23/2018  WBC: 5.0  ANC: 3.2    10/25/2018  WBC: 3.9  ANC: 2.1    4/4/2019  WBC: 3.8  HGB: 13.0  PLT: 158  MCV: 88.5  ANC: 2.5  TSH: 2.75    4/26/2019  WBC: 3.3  HGB: 13.1  PLT: 177  ANC: 2.1    6/3/2019  WBC: 3.5  HGB: 13.7  PLT: 153  MCV: 88.4    Records reviewed and summarized above. Assessment:   1) Neutropenia  Mild. Uncertain etiology. Normal WBC in 10/2017 around the time of back surgery, but otherwise it has been persistently mildly low. There is no evidence by history of an inherited neutropenia, no history of chronic or recurrent infection, and no medication that is likely to be contributing. Her lack of symptoms or other cytopenias is certainly reassuring. I will explore some other possibilities today with labwork. If these studies are negative and the neutrophil count remains >1000, then a period of observation may be reasonable, with further evaluation (ie bone marrow biopsy) only if the counts worsen or the patient develops recurrent infections. Plan:     · Labs  · Return to see me to review results    I appreciate the opportunity to participate in Ms. Mel Shereejessica man.     Signed By: Saud White MD

## 2019-06-24 ENCOUNTER — TELEPHONE (OUTPATIENT)
Dept: ONCOLOGY | Age: 64
End: 2019-06-24

## 2019-06-24 NOTE — TELEPHONE ENCOUNTER
[address] at [facility]  [phone]    06/24/19- Phone call placed to pt to remind pt to have labs drawn prior to her follow up appointment with . Pt verbalized understanding.

## 2019-06-26 ENCOUNTER — OFFICE VISIT (OUTPATIENT)
Dept: ONCOLOGY | Age: 64
End: 2019-06-26

## 2019-06-26 VITALS
SYSTOLIC BLOOD PRESSURE: 173 MMHG | TEMPERATURE: 97.5 F | BODY MASS INDEX: 21.09 KG/M2 | HEART RATE: 75 BPM | RESPIRATION RATE: 18 BRPM | WEIGHT: 119 LBS | OXYGEN SATURATION: 98 % | HEIGHT: 63 IN | DIASTOLIC BLOOD PRESSURE: 88 MMHG

## 2019-06-26 DIAGNOSIS — D70.9 NEUTROPENIA, UNSPECIFIED TYPE (HCC): Primary | ICD-10-CM

## 2019-06-26 LAB
ALBUMIN SERPL ELPH-MCNC: 4.2 G/DL (ref 2.9–4.4)
ALBUMIN/GLOB SERPL: 1.8 {RATIO} (ref 0.7–1.7)
ALPHA1 GLOB SERPL ELPH-MCNC: 0.1 G/DL (ref 0–0.4)
ALPHA2 GLOB SERPL ELPH-MCNC: 0.5 G/DL (ref 0.4–1)
ANA SER QL: NEGATIVE
B-GLOBULIN SERPL ELPH-MCNC: 0.9 G/DL (ref 0.7–1.3)
BASOPHILS # BLD AUTO: 0 X10E3/UL (ref 0–0.2)
BASOPHILS NFR BLD AUTO: 0 %
COPPER SERPL-MCNC: 104 UG/DL (ref 72–166)
CRP SERPL-MCNC: <1 MG/L (ref 0–10)
EOSINOPHIL # BLD AUTO: 0.1 X10E3/UL (ref 0–0.4)
EOSINOPHIL NFR BLD AUTO: 2 %
ERYTHROCYTE [DISTWIDTH] IN BLOOD BY AUTOMATED COUNT: 13.5 % (ref 12.3–15.4)
ERYTHROCYTE [SEDIMENTATION RATE] IN BLOOD BY WESTERGREN METHOD: 2 MM/HR (ref 0–40)
FOLATE SERPL-MCNC: >20 NG/ML
GAMMA GLOB SERPL ELPH-MCNC: 0.7 G/DL (ref 0.4–1.8)
GLOBULIN SER CALC-MCNC: 2.3 G/DL (ref 2.2–3.9)
HCT VFR BLD AUTO: 41.4 % (ref 34–46.6)
HGB BLD-MCNC: 13.7 G/DL (ref 11.1–15.9)
HIV 1+2 AB+HIV1 P24 AG SERPL QL IA: NON REACTIVE
IGA SERPL-MCNC: 132 MG/DL (ref 87–352)
IGG SERPL-MCNC: 783 MG/DL (ref 700–1600)
IGM SERPL-MCNC: 88 MG/DL (ref 26–217)
IMM GRANULOCYTES # BLD AUTO: 0 X10E3/UL (ref 0–0.1)
IMM GRANULOCYTES NFR BLD AUTO: 0 %
LYMPHOCYTES # BLD AUTO: 1.3 X10E3/UL (ref 0.7–3.1)
LYMPHOCYTES NFR BLD AUTO: 33 %
M PROTEIN SERPL ELPH-MCNC: ABNORMAL G/DL
MCH RBC QN AUTO: 28.8 PG (ref 26.6–33)
MCHC RBC AUTO-ENTMCNC: 33.1 G/DL (ref 31.5–35.7)
MCV RBC AUTO: 87 FL (ref 79–97)
MONOCYTES # BLD AUTO: 0.4 X10E3/UL (ref 0.1–0.9)
MONOCYTES NFR BLD AUTO: 9 %
NEUTROPHILS # BLD AUTO: 2.3 X10E3/UL (ref 1.4–7)
NEUTROPHILS NFR BLD AUTO: 56 %
PATH REV BLD -IMP: NORMAL
PATHOLOGIST NAME: NORMAL
PLATELET # BLD AUTO: 177 X10E3/UL (ref 150–450)
PLEASE NOTE, 011150: ABNORMAL
PROT PATTERN SERPL IFE-IMP: NORMAL
PROT SERPL-MCNC: 6.5 G/DL (ref 6–8.5)
RBC # BLD AUTO: 4.75 X10E6/UL (ref 3.77–5.28)
VIT B12 SERPL-MCNC: 829 PG/ML (ref 232–1245)
WBC # BLD AUTO: 4.1 X10E3/UL (ref 3.4–10.8)

## 2019-06-26 NOTE — PROGRESS NOTES
Funmi Monahan is a 70 y.o. female follow up for leukopenia. 1. Have you been to the ER, urgent care clinic since your last visit? Hospitalized since your last visit? No     2. Have you seen or consulted any other health care providers outside of the [address] [place] [address] since your last visit? Include any pap smears or colon screening.  No

## 2019-06-26 NOTE — PROGRESS NOTES
[facility] [place] at [place] [address] [address] [facility]  Augie Jasso: [phone]  F: [phone]      Reason for Visit:   Raul Cummins is a 70 y.o. female who is seen in follow up for evaluation of leukopenia. History of Present Illness:   Presents for follow up. Feeling well. Denies pain. No new complaints. Appetite has been stable. No nausea or vomiting. No change in bowels. Remaining active. States has been emotionally down, worried about her blood work results in the last few weeks. PAST HISTORY: The following sections were reviewed and updated in the EMR as appropriate: PMH, SH, FH, Medications, Allergies. No Known Allergies     Review of Systems: A complete review of systems was obtained, reviewed, and scanned into the EMR. Pertinent findings reviewed above. Physical Exam:     Visit Vitals  /88 (BP 1 Location: Right arm, BP Patient Position: Sitting)   Pulse 75   Temp 97.5 °F (36.4 °C) (Temporal)   Resp 18   Ht 5' 3\" (1.6 m)   Wt 119 lb (54 kg)   SpO2 98%   BMI 21.08 kg/m²     General: No distress  Eyes: PERRLA, anicteric sclerae  HENT: Atraumatic, OP clear  Neck: Supple  Lymphatic: No cervical, supraclavicular, or inguinal adenopathy  Respiratory: CTAB, normal respiratory effort  CV: Normal rate, regular rhythm, no murmurs, no peripheral edema  GI: Soft, nontender, nondistended, no masses, no hepatomegaly, no splenomegaly  MS: Normal gait and station. Digits without clubbing or cyanosis. Skin: No rashes, ecchymoses, or petechiae. Normal temperature, turgor, and texture. Psych: Alert, oriented, appropriate affect, normal judgment/insight    Results:     Lab Results   Component Value Date/Time    WBC 4.1 06/19/2019 08:40 AM    HGB 13.7 06/19/2019 08:40 AM    HCT 41.4 06/19/2019 08:40 AM    PLATELET 703 10/31/5976 08:40 AM    MCV 87 06/19/2019 08:40 AM    ABS.  NEUTROPHILS 2.3 06/19/2019 08:40 AM     Lab Results   Component Value Date/Time    Sodium 136 10/23/2017 06:10 AM    Potassium 4.5 10/23/2017 06:10 AM    Chloride 100 10/23/2017 06:10 AM    CO2 28 10/23/2017 06:10 AM    Glucose 182 (H) 10/23/2017 06:10 AM    BUN 20 10/23/2017 06:10 AM    Creatinine 0.53 (L) 10/23/2017 06:10 AM    GFR est AA >60 10/23/2017 06:10 AM    GFR est non-AA >60 10/23/2017 06:10 AM    Calcium 8.2 (L) 10/23/2017 06:10 AM    Glucose (POC) 274 (H) 10/23/2017 08:26 AM    Creatinine (POC) 0.6 05/21/2018 08:01 PM     Lab Results   Component Value Date/Time    Bilirubin, total 0.6 10/11/2017 03:37 AM    ALT (SGPT) 19 10/11/2017 03:37 AM    AST (SGOT) 12 (L) 10/11/2017 03:37 AM    Alk. phosphatase 70 10/11/2017 03:37 AM    Protein, total 6.5 06/19/2019 08:40 AM    Albumin 3.4 (L) 10/11/2017 03:37 AM    Globulin 3.5 10/11/2017 03:37 AM     Lab Results   Component Value Date/Time    Vitamin B12 829 06/19/2019 08:40 AM    Folate >20.0 06/19/2019 08:40 AM    Sed rate (ESR) 2 06/19/2019 08:40 AM    C-Reactive Protein, Qt <1 06/19/2019 08:40 AM    TSH 0.69 10/12/2017 03:48 AM    M-Ernie Not Observed 06/19/2019 08:40 AM    Antinuclear Antibodies Direct Negative 06/19/2019 08:40 AM    Lipase 162 10/05/2017 03:44 PM    HIV SCREEN 4TH GENERATION WRFX Non Reactive 06/19/2019 08:40 AM     Lab Results   Component Value Date/Time    INR 1.1 10/11/2017 03:37 AM    aPTT 24.7 10/05/2017 03:44 PM     WBC   Date Value   06/19/2019 4.1 x10E3/uL   10/20/2017 10.4 K/uL   10/07/2017 7.3 K/uL   10/06/2017 5.7 K/uL   10/05/2017 8.8 K/uL   10/02/2017 6.8 K/uL         11/29/2017  WBC: 3.3  ANC: 1.9    3/8/2018  WBC: 4.1  ANC: 2.4    7/23/2018  WBC: 5.0  ANC: 3.2    10/25/2018  WBC: 3.9  ANC: 2.1    4/4/2019  WBC: 3.8  HGB: 13.0  PLT: 158  MCV: 88.5  ANC: 2.5  TSH: 2.75    4/26/2019  WBC: 3.3  HGB: 13.1  PLT: 177  ANC: 2.1    6/3/2019  WBC: 3.5  HGB: 13.7  PLT: 153  MCV: 88.4    Peripheral smear 6/19/2019: normal    Assessment:   1) Neutropenia  Mild. Uncertain etiology.   Normal WBC in 10/2017 around the time of back surgery, but otherwise it has been persistently mildly low. There is no evidence by history of an inherited neutropenia, no history of chronic or recurrent infection, and no medication that is likely to be contributing. Her lack of symptoms or other cytopenias is certainly reassuring. Further lab work has been unrevealing. Repeat CBC with WBC in normal range. Recommend period of observation with repeat CBC in 1 year. Further evaluation (ie bone marrow biopsy) only if the counts worsen or the patient develops recurrent infections. Patient is agreeable to plan. Plan:     · CBC with diff June 2020  · Return to see me to review results    Patient was seen in conjunction with Jessica Kat NP.     Signed By: Jorge Gilliam MD

## 2020-01-15 ENCOUNTER — HOSPITAL ENCOUNTER (OUTPATIENT)
Dept: MAMMOGRAPHY | Age: 65
Discharge: HOME | End: 2020-01-15
Attending: PHYSICIAN ASSISTANT
Payer: MEDICARE

## 2020-01-15 DIAGNOSIS — E55.9 VITAMIN D DEFICIENCY: ICD-10-CM

## 2020-01-15 DIAGNOSIS — Z12.31 VISIT FOR SCREENING MAMMOGRAM: ICD-10-CM

## 2020-01-15 PROCEDURE — 77067 SCR MAMMO BI INCL CAD: CPT

## 2020-01-15 PROCEDURE — 77080 DXA BONE DENSITY AXIAL: CPT

## (undated) DEVICE — PILLOW POS AD L7IN R FOAM HD REST INTUB SLOT DISP

## (undated) DEVICE — LAMINECTOMY RICHMOND-LF: Brand: MEDLINE INDUSTRIES, INC.

## (undated) DEVICE — SUTURE MCRYL SZ 4-0 L27IN ABSRB UD L19MM PS-2 1/2 CIR PRIM Y426H

## (undated) DEVICE — SUTURE NRLN SZ 4-0 L18IN NONABSORBABLE BLK L13MM TF 1/2 CIR C584D

## (undated) DEVICE — DISPOSABLE TUBING SET AND EXTENDER FILTER TUBING

## (undated) DEVICE — DRAPE MICSCP W46XL120IN POLY DRAWSTRAP W STEREO OBS TB AND

## (undated) DEVICE — PREP SKN PREVAIL 40ML APPL --

## (undated) DEVICE — MICROMYST APPLICATOR (14CM) 5 PACK - FOR USE WITH FLOW REGULATOR: Brand: MICROMYST

## (undated) DEVICE — INTENDED FOR TISSUE SEPARATION, AND OTHER PROCEDURES THAT REQUIRE A SHARP SURGICAL BLADE TO PUNCTURE OR CUT.: Brand: BARD-PARKER ® CARBON RIB-BACK BLADES

## (undated) DEVICE — BONE WAX WHITE: Brand: BONE WAX WHITE

## (undated) DEVICE — TOOL 14MH30 LEGEND 14CM 3MM: Brand: MIDAS REX ™

## (undated) DEVICE — DEVON™ KNEE AND BODY STRAP 60" X 3" (1.5 M X 7.6 CM): Brand: DEVON

## (undated) DEVICE — TELFA NON-ADHERENT ABSORBENT DRESSING: Brand: TELFA

## (undated) DEVICE — TELFA ADHESIVE ISLAND DRESSING: Brand: TELFA

## (undated) DEVICE — HANDLE LT SNAP ON ULT DURABLE LENS FOR TRUMPF ALC DISPOSABLE

## (undated) DEVICE — DRAPE XR C ARM 41X74IN LF --

## (undated) DEVICE — 1200 GUARD II KIT W/5MM TUBE W/O VAC TUBE: Brand: GUARDIAN

## (undated) DEVICE — CANNULA INJ L2.5IN BLNT TIP 3MM CLR BODY W/ 1 W VLV DLP

## (undated) DEVICE — STERILE POLYISOPRENE POWDER-FREE SURGICAL GLOVES WITH EMOLLIENT COATING: Brand: PROTEXIS

## (undated) DEVICE — DURASEAL® DURAL SEALANT SYSTEM 5ML 5 PACK: Brand: DURASEAL®

## (undated) DEVICE — INFECTION CONTROL KIT SYS

## (undated) DEVICE — SUTURE NONABSORBABLE MONOFILAMENT 5-0 CV-6 TTC-9 24 IN GORTX 6K02A

## (undated) DEVICE — TRAY CATH OD16FR SIL URIN M STATLOK STBL DEV SURSTP

## (undated) DEVICE — STRAIGHT TIP, UNIVERSAL

## (undated) DEVICE — GAUZE SPONGES,12 PLY: Brand: CURITY

## (undated) DEVICE — KENDALL SCD EXPRESS SLEEVES, KNEE LENGTH, MEDIUM: Brand: KENDALL SCD

## (undated) DEVICE — FLOSEAL MATRIX IS INDICATED IN SURGICAL PROCEDURES (OTHER THAN IN OPHTHALMIC) AS AN ADJUNCT TO HEMOSTASIS WHEN CONTROL OF BLEEDING BY LIGATURE OR CONVENTIONALPROCEDURES IS INEFFECTIVE OR IMPRACTICAL.: Brand: FLOSEAL HEMOSTATIC MATRIX

## (undated) DEVICE — SUTURE VCRL SZ 0 L18IN ABSRB VLT L36MM CT-1 1/2 CIR J740D

## (undated) DEVICE — CODMAN® SURGICAL PATTIES 1" X 1" (2.54CM X 2.54CM): Brand: CODMAN®

## (undated) DEVICE — X-RAY SPONGES,16 PLY: Brand: DERMACEA

## (undated) DEVICE — SUTURE VCRL SZ 2-0 L18IN ABSRB UD L26MM CP-2 1/2 CIR REV J762D

## (undated) DEVICE — ASTOUND STANDARD SURGICAL GOWN, XXL: Brand: CONVERTORS

## (undated) DEVICE — CODMAN® INTEGRATED BIPOLAR CORD AND TUBING SET FLYING LEADS, ROTARY PUMP: Brand: CODMAN®

## (undated) DEVICE — COVER,TABLE,HEAVY DUTY,60"X90",STRL: Brand: MEDLINE

## (undated) DEVICE — SOLUTION IV 250ML 0.9% SOD CHL CLR INJ FLX BG CONT PRT CLSR